# Patient Record
Sex: FEMALE | Race: ASIAN | Employment: OTHER | URBAN - METROPOLITAN AREA
[De-identification: names, ages, dates, MRNs, and addresses within clinical notes are randomized per-mention and may not be internally consistent; named-entity substitution may affect disease eponyms.]

---

## 2017-03-03 ENCOUNTER — HOSPITAL ENCOUNTER (OUTPATIENT)
Dept: MAMMOGRAPHY | Age: 62
Discharge: HOME | End: 2017-03-03
Attending: FAMILY MEDICINE
Payer: COMMERCIAL

## 2017-03-03 ENCOUNTER — HOSPITAL ENCOUNTER (OUTPATIENT)
Dept: ULTRASOUND IMAGING | Age: 62
Discharge: HOME | End: 2017-03-03
Attending: FAMILY MEDICINE
Payer: COMMERCIAL

## 2017-03-03 DIAGNOSIS — R92.8 ABNORMAL MAMMOGRAM: ICD-10-CM

## 2017-03-03 DIAGNOSIS — N64.89 BREAST ASYMMETRY: ICD-10-CM

## 2017-03-03 PROCEDURE — 77065 DX MAMMO INCL CAD UNI: CPT

## 2017-06-30 ENCOUNTER — OFFICE VISIT (OUTPATIENT)
Dept: CARDIOLOGY CLINIC | Age: 62
End: 2017-06-30

## 2017-06-30 VITALS
DIASTOLIC BLOOD PRESSURE: 84 MMHG | HEIGHT: 61 IN | HEART RATE: 86 BPM | WEIGHT: 146 LBS | BODY MASS INDEX: 27.56 KG/M2 | SYSTOLIC BLOOD PRESSURE: 124 MMHG

## 2017-06-30 DIAGNOSIS — R07.9 CHEST PAIN, UNSPECIFIED TYPE: ICD-10-CM

## 2017-06-30 DIAGNOSIS — R06.02 SOB (SHORTNESS OF BREATH): ICD-10-CM

## 2017-06-30 DIAGNOSIS — R94.31 ABNORMAL EKG: Primary | ICD-10-CM

## 2017-06-30 DIAGNOSIS — R25.2 CRAMP OF BOTH LOWER EXTREMITIES: ICD-10-CM

## 2017-06-30 DIAGNOSIS — E78.5 HYPERLIPIDEMIA, UNSPECIFIED HYPERLIPIDEMIA TYPE: ICD-10-CM

## 2017-06-30 NOTE — LETTER
Teresa Anderson 
1955 
 
6/30/2017 Dear Anderson Garza MD 
 
I had the pleasure of evaluating  Ms. Neal in office today. Below are the relevant portions of my assessment and plan of care. ICD-10-CM ICD-9-CM 1. Abnormal EKG Y99.11 921.27 METABOLIC PANEL, BASIC  
 negative stress test 
medical managment 2. Chest pain, unspecified type B38.7 363.78 METABOLIC PANEL, BASIC  
 atypical 
non cardiac 3. SOB (shortness of breath) R06.02 786.05   
 stable 4. Hyperlipidemia, unspecified hyperlipidemia type E78.5 272.4   
 started on rosuvastatin 3 month ago 5. Cramp of both lower extremities R25.2 729.82   
 check bmp 
hydration 
has crams before stating statin Current Outpatient Prescriptions Medication Sig Dispense Refill  naproxen (NAPROSYN) 500 mg tablet Take 500 mg by mouth as needed.  omeprazole (PRILOSEC) 20 mg capsule Take 20 mg by mouth nightly as needed.  HYDROcodone-acetaminophen (NORCO) 5-325 mg per tablet Take 1 Tab by mouth every four (4) hours as needed. Max Daily Amount: 6 Tabs. 30 Tab 0 Orders Placed This Encounter  METABOLIC PANEL, BASIC Standing Status:   Future Standing Expiration Date:   7/30/2017 If you have questions, please do not hesitate to call me. I look forward to following Ms. Eleni Frazier along with you. Sincerely, Khang Fink MD

## 2017-06-30 NOTE — PROGRESS NOTES
HISTORY OF PRESENT ILLNESS  Katey Burkett is a 63 y.o. female. HPI Comments:       Shortness of Breath   The history is provided by the patient. This is a recurrent problem. The problem occurs intermittently. The problem has not changed since onset. Pertinent negatives include no fever, no headaches, no cough, no sputum production, no hemoptysis, no wheezing, no PND, no orthopnea, no chest pain, no vomiting, no abdominal pain, no rash, no leg swelling and no claudication. Chest Pain (Angina)    The history is provided by the patient. This is a new problem. The problem has not changed since onset. The problem occurs every several days. The pain is associated with exertion and rest. The pain is present in the substernal region and left side. The pain is mild. The quality of the pain is described as pressure-like and dull. The pain does not radiate. Associated symptoms include shortness of breath. Pertinent negatives include no abdominal pain, no claudication, no cough, no dizziness, no fever, no headaches, no hemoptysis, no nausea, no orthopnea, no palpitations, no PND, no sputum production, no vomiting and no weakness. Review of Systems   Constitutional: Negative for chills and fever. HENT: Negative for nosebleeds. Eyes: Negative for blurred vision and double vision. Respiratory: Positive for shortness of breath. Negative for cough, hemoptysis, sputum production and wheezing. Cardiovascular: Negative for chest pain, palpitations, orthopnea, claudication, leg swelling and PND. Gastrointestinal: Negative for abdominal pain, heartburn, nausea and vomiting. Musculoskeletal: Negative for myalgias. Skin: Negative for rash. Neurological: Negative for dizziness, weakness and headaches. Endo/Heme/Allergies: Does not bruise/bleed easily.      Family History   Problem Relation Age of Onset    Heart Surgery Neg Hx     Heart Attack Neg Hx        Past Medical History:   Diagnosis Date    Anxiety  Arthritis     GERD (gastroesophageal reflux disease)        Past Surgical History:   Procedure Laterality Date    HX CHOLECYSTECTOMY  2015    HX HYSTERECTOMY      HX OTHER SURGICAL      parotid       Social History   Substance Use Topics    Smoking status: Never Smoker    Smokeless tobacco: Never Used    Alcohol use No       No Known Allergies    Outpatient Prescriptions Marked as Taking for the 6/30/17 encounter (Office Visit) with Michelle Luna MD   Medication Sig Dispense Refill    naproxen (NAPROSYN) 500 mg tablet Take 500 mg by mouth as needed.  omeprazole (PRILOSEC) 20 mg capsule Take 20 mg by mouth nightly as needed. Visit Vitals    /84    Pulse 86    Ht 5' 1\" (1.549 m)    Wt 66.2 kg (146 lb)    BMI 27.59 kg/m2       Physical Exam   Constitutional: She is oriented to person, place, and time. She appears well-developed and well-nourished. HENT:   Head: Normocephalic and atraumatic. Eyes: Conjunctivae are normal.   Neck: Neck supple. No JVD present. No tracheal deviation present. No thyromegaly present. Cardiovascular: Normal rate and regular rhythm. PMI is not displaced. Exam reveals no gallop, no S3 and no decreased pulses. No murmur heard. Pulmonary/Chest: No respiratory distress. She has no wheezes. She has no rales. She exhibits no tenderness. Abdominal: Soft. There is no tenderness. Musculoskeletal: She exhibits no edema. Neurological: She is alert and oriented to person, place, and time. Skin: Skin is warm. Psychiatric: She has a normal mood and affect. Ms. Dereck Reilly has a reminder for a \"due or due soon\" health maintenance. I have asked that she contact her primary care provider for follow-up on this health maintenance. I have personally reviewed patients ekg done at other facility.   5/2016  Sinus rhythm with occasional premature ventricular complexes   Inferior infarct , age undetermined   Cannot rule out Anterior infarct , age undetermined   Abnormal ECG   No previous ECGs available   NUCLEAR IMAGIN2016     Findings:    1. Stress images reveal normal Myoview distrubution in all the LV segments in short axis, vertical and horizontal long axis views.    2. Resting images have a normal uptake.    3. Gated images reveal normal wall motion and the ejection fraction is calculated to be 79%.    Conclusion:    1. Normal perfusion scan.    2. Normal wall motion and ejection fraction.    3. Low risk scan. SUMMARY:echo:2016  Procedure information: This was a technically difficult study. Left ventricle: Systolic function was normal. Ejection fraction was  estimated to be 60 %. No obvious wall motion abnormalities identified in  the views obtained. There was mild concentric hypertrophy. Doppler  parameters were consistent with abnormal left ventricular relaxation  (grade 1 diastolic dysfunction). Pericardium: A circumferential pericardial fat pad was present . Assessment         ICD-10-CM ICD-9-CM    1. Abnormal EKG H11.19 952.05 METABOLIC PANEL, BASIC    negative stress test  medical managment   2. Chest pain, unspecified type S11.6 750.22 METABOLIC PANEL, BASIC    atypical  non cardiac   3. SOB (shortness of breath) R06.02 786.05     stable   4. Hyperlipidemia, unspecified hyperlipidemia type E78.5 272.4     started on rosuvastatin 3 month ago     5. Cramp of both lower extremities R25.2 729.82     check bmp  hydration  has crams before stating statin       There are no discontinued medications. Orders Placed This Encounter    METABOLIC PANEL, BASIC     Standing Status:   Future     Standing Expiration Date:   2017       Follow-up Disposition:  Return in about 1 year (around 2018).

## 2017-06-30 NOTE — MR AVS SNAPSHOT
Visit Information Date & Time Provider Department Dept. Phone Encounter #  
 6/30/2017 10:00 AM Marietta Connor MD [facility] [address] [place] [address] [id number] Follow-up Instructions Return in about 1 year (around 6/30/2018). Your Appointments 6/29/2018 10:00 AM  
Office Visit with Marietta Connor MD  
[facility]) Appt Note: 1 yr  
 [address] [facility], [address] [place] [id number]  
[address] [facility], [address] [place] [address] [place] Upcoming Health Maintenance Date Due Hepatitis C Screening 1955 DTaP/Tdap/Td series (1 - Tdap) 10/10/1978 PAP AKA CERVICAL CYTOLOGY 10/10/1978 FOBT Q 1 YEAR AGE 50-75 10/10/2007 INFLUENZA AGE 9 TO ADULT 8/1/2017 BREAST CANCER SCRN MAMMOGRAM 3/3/2019 Allergies as of 6/30/2017  Review Complete On: 6/30/2017 By: Marietta Connor MD  
 No Known Allergies Current Immunizations  Never Reviewed No immunizations on file. Not reviewed this visit You Were Diagnosed With   
  
 Codes Comments Abnormal EKG    -  Primary ICD-10-CM: R94.31 
ICD-9-CM: 794.31 negative stress test 
medical managment Chest pain, unspecified type     ICD-10-CM: R07.9 ICD-9-CM: 786.50 atypical 
non cardiac  
 SOB (shortness of breath)     ICD-10-CM: R06.02 
ICD-9-CM: 786.05 stable Hyperlipidemia, unspecified hyperlipidemia type     ICD-10-CM: E78.5 ICD-9-CM: 272.4 started on rosuvastatin 3 month ago Cramp of both lower extremities     ICD-10-CM: R25.2 ICD-9-CM: 729.82 check bmp 
hydration 
has crams before stating statin Vitals BP Pulse Height(growth percentile) Weight(growth percentile) BMI OB Status 124/84 86 5' 1\" (1.549 m) 146 lb (66.2 kg) 27.59 kg/m2 Hysterectomy Smoking Status Never Smoker Vitals History BMI and BSA Data  Body Mass Index Body Surface Area  
 27.59 kg/m 2 1.69 m 2  
  
  
 Preferred Pharmacy Pharmacy Name Phone [facility], [address] [facility] [phone] Your Updated Medication List  
  
   
This list is accurate as of: 6/30/17 10:16 AM.  Always use your most recent med list.  
  
  
  
  
 HYDROcodone-acetaminophen 5-325 mg per tablet Commonly known as:  Lamar Rook Take 1 Tab by mouth every four (4) hours as needed. Max Daily Amount: 6 Tabs. naproxen 500 mg tablet Commonly known as:  NAPROSYN Take 500 mg by mouth as needed. omeprazole 20 mg capsule Commonly known as:  PRILOSEC Take 20 mg by mouth nightly as needed. Follow-up Instructions Return in about 1 year (around 6/30/2018). To-Do List   
 07/07/2017 Lab:  METABOLIC PANEL, BASIC Introducing [facility] SERVICES! Javid Abrams introduces [employer] patient portal. Now you can access parts of your medical record, email your doctor's office, and request medication refills online. 1. In your internet browser, go to [email or web address] 2. Click on the First Time User? Click Here link in the Sign In box. You will see the New Member Sign Up page. 3. Enter your [employer] Access Code exactly as it appears below. You will not need to use this code after youve completed the sign-up process. If you do not sign up before the expiration date, you must request a new code. · [employer] Access Code: [email or web address] Expires: 9/28/2017  9:49 AM 
 
4. Enter the last four digits of your Social Security Number (xxxx) and Date of Birth (mm/dd/yyyy) as indicated and click Submit. You will be taken to the next sign-up page. 5. Create a [employer] ID. This will be your [employer] login ID and cannot be changed, so think of one that is secure and easy to remember. 6. Create a [employer] password. You can change your password at any time. 7. Enter your Password Reset Question and Answer.  This can be used at a later time if you forget your password. 8. Enter your e-mail address. You will receive e-mail notification when new information is available in [address] 9. Click Sign Up. You can now view and download portions of your medical record. 10. Click the Download Summary menu link to download a portable copy of your medical information. If you have questions, please visit the Frequently Asked Questions section of the [employer] website. Remember, [employer] is NOT to be used for urgent needs. For medical emergencies, dial 911. Now available from your iPhone and Android! Please provide this summary of care documentation to your next provider. Your primary care clinician is listed as Marquise Aponte. If you have any questions after today's visit, please call [phone].

## 2017-07-04 LAB
BUN SERPL-MCNC: 9 MG/DL (ref 6–24)
BUN/CREAT SERPL: 15 (ref 9–23)
CALCIUM SERPL-MCNC: 9.3 MG/DL (ref 8.7–10.2)
CHLORIDE SERPL-SCNC: 105 MMOL/L (ref 96–106)
CO2 SERPL-SCNC: 25 MMOL/L (ref 18–29)
CREAT SERPL-MCNC: 0.59 MG/DL (ref 0.57–1)
GLUCOSE SERPL-MCNC: 98 MG/DL (ref 65–99)
POTASSIUM SERPL-SCNC: 4.3 MMOL/L (ref 3.5–5.2)
SODIUM SERPL-SCNC: 146 MMOL/L (ref 134–144)

## 2017-07-05 ENCOUNTER — TELEPHONE (OUTPATIENT)
Dept: CARDIOLOGY CLINIC | Age: 62
End: 2017-07-05

## 2017-07-05 NOTE — TELEPHONE ENCOUNTER
----- Message from Bobby Sainz MD sent at 7/5/2017 11:40 AM EDT -----  Lab/test  reviewed  No significant abnormality

## 2017-07-05 NOTE — TELEPHONE ENCOUNTER
Patient called to review lab results. She voices understanding and acceptance of this advice and will call back if any further questions or concerns.

## 2017-07-07 DIAGNOSIS — R94.31 ABNORMAL EKG: ICD-10-CM

## 2017-07-07 DIAGNOSIS — R07.9 CHEST PAIN, UNSPECIFIED TYPE: ICD-10-CM

## 2017-09-06 ENCOUNTER — HOSPITAL ENCOUNTER (OUTPATIENT)
Dept: MAMMOGRAPHY | Age: 62
Discharge: HOME | End: 2017-09-06
Payer: COMMERCIAL

## 2017-09-06 DIAGNOSIS — Z12.31 VISIT FOR SCREENING MAMMOGRAM: ICD-10-CM

## 2017-09-06 PROCEDURE — 77067 SCR MAMMO BI INCL CAD: CPT

## 2018-04-24 ENCOUNTER — HOSPITAL ENCOUNTER (OUTPATIENT)
Dept: LAB | Age: 63
Discharge: HOME | End: 2018-04-24

## 2018-04-24 LAB — SENTARA SPECIMEN COL,SENBCF: NORMAL

## 2018-04-24 PROCEDURE — 99001 SPECIMEN HANDLING PT-LAB: CPT | Performed by: FAMILY MEDICINE

## 2018-06-13 ENCOUNTER — HOSPITAL ENCOUNTER (OUTPATIENT)
Dept: GENERAL RADIOLOGY | Age: 63
Discharge: HOME | End: 2018-06-13
Payer: COMMERCIAL

## 2018-06-13 DIAGNOSIS — M46.93 INFLAMMATORY SPONDYLOPATHY OF CERVICOTHORACIC REGION (HCC): ICD-10-CM

## 2018-06-13 PROCEDURE — 72050 X-RAY EXAM NECK SPINE 4/5VWS: CPT

## 2018-06-29 ENCOUNTER — OFFICE VISIT (OUTPATIENT)
Dept: CARDIOLOGY CLINIC | Age: 63
End: 2018-06-29

## 2018-06-29 VITALS
HEART RATE: 87 BPM | DIASTOLIC BLOOD PRESSURE: 83 MMHG | WEIGHT: 148 LBS | SYSTOLIC BLOOD PRESSURE: 132 MMHG | HEIGHT: 61 IN | BODY MASS INDEX: 27.94 KG/M2

## 2018-06-29 DIAGNOSIS — R06.02 SOB (SHORTNESS OF BREATH): ICD-10-CM

## 2018-06-29 DIAGNOSIS — R94.31 ABNORMAL EKG: Primary | ICD-10-CM

## 2018-06-29 DIAGNOSIS — R07.9 CHEST PAIN, UNSPECIFIED TYPE: ICD-10-CM

## 2018-06-29 DIAGNOSIS — E78.5 HYPERLIPIDEMIA, UNSPECIFIED HYPERLIPIDEMIA TYPE: ICD-10-CM

## 2018-06-29 RX ORDER — ATORVASTATIN CALCIUM 20 MG/1
20 TABLET, FILM COATED ORAL DAILY
COMMUNITY
End: 2019-09-04

## 2018-06-29 NOTE — MR AVS SNAPSHOT
[address] [facility] [address] 
 
 
 [address] [facility], [address] [facility] [address] 
[phone] Patient: Gill Mayen 
MRN: [id number] :1955 Visit Information Date & Time Provider Department Dept. Phone Encounter #  
 2018 10:00 AM Mike Singh MD [facility] [address] [place] [address] [id number] Follow-up Instructions Return in about 1 year (around 2019). Your Appointments 2019 10:00 AM  
Office Visit with Mike Singh MD  
[facility]) Appt Note: 1 yr  
 [address] [facility], [address] [facility] [address]  
[address] [facility], [address] [place] [address] Upcoming Health Maintenance Date Due Hepatitis C Screening 1955 DTaP/Tdap/Td series (1 - Tdap) 10/10/1978 PAP AKA CERVICAL CYTOLOGY 10/10/1978 FOBT Q 1 YEAR AGE 50-75 10/10/2007 ZOSTER VACCINE AGE 60> 8/10/2017 Influenza Age 5 to Adult 2018 BREAST CANCER SCRN MAMMOGRAM 2019 Allergies as of 2018  Review Complete On: 2018 By: Angela Bledsoe No Known Allergies Current Immunizations  Never Reviewed No immunizations on file. Not reviewed this visit You Were Diagnosed With   
  
 Codes Comments Abnormal EKG    -  Primary ICD-10-CM: R94.31 
ICD-9-CM: 794.31 Reported as possible old MI but negative stress test a few years ago clinically stable Hyperlipidemia, unspecified hyperlipidemia type     ICD-10-CM: E78.5 ICD-9-CM: 272.4 Stable-on statin 
lab with pcp  
 SOB (shortness of breath)     ICD-10-CM: R06.02 
ICD-9-CM: 786.05 Stable symptoms exertional.  Negative stress test and normal LV systolic function. Chest pain, unspecified type     ICD-10-CM: R07.9 ICD-9-CM: 786.50 atypical 
mobxgto-awtlsbmf-xtsznbto spine arthritis Vitals BP Pulse Height(growth percentile) Weight(growth percentile) BMI OB Status 132/83 87 5' 1\" (1.549 m) 148 lb (67.1 kg) 27.96 kg/m2 Hysterectomy Smoking Status Never Smoker Vitals History BMI and BSA Data Body Mass Index Body Surface Area  
 27.96 kg/m 2 1.7 m 2 Preferred Pharmacy Pharmacy Name Phone Mayela Pelletier, [address] [phone] Your Updated Medication List  
  
   
This list is accurate as of 6/29/18 10:11 AM.  Always use your most recent med list.  
  
  
  
  
 atorvastatin 20 mg tablet Commonly known as:  LIPITOR Take  by mouth daily. HYDROcodone-acetaminophen 5-325 mg per tablet Commonly known as:  Mary Lou Plum Take 1 Tab by mouth every four (4) hours as needed. Max Daily Amount: 6 Tabs. naproxen 500 mg tablet Commonly known as:  NAPROSYN Take 500 mg by mouth as needed. omeprazole 20 mg capsule Commonly known as:  PRILOSEC Take 20 mg by mouth nightly as needed. Follow-up Instructions Return in about 1 year (around 6/29/2019). Please provide this summary of care documentation to your next provider. Your primary care clinician is listed as Micki Simposn. If you have any questions after today's visit, please call [phone].

## 2018-06-29 NOTE — LETTER
Roxanna Flanagan 
1955 
 
6/29/2018 Dear Meka Kerns MD 
 
I had the pleasure of evaluating  Ms. Neal in office today. Below are the relevant portions of my assessment and plan of care. ICD-10-CM ICD-9-CM 1. Abnormal EKG R94.31 794.31 Reported as possible old MI but negative stress test a few years ago clinically stable 2. Hyperlipidemia, unspecified hyperlipidemia type E78.5 272.4 Stable-on statin 
lab with pcp 3. SOB (shortness of breath) R06.02 786.05 Stable symptoms exertional.  Negative stress test and normal LV systolic function. 4. Chest pain, unspecified type R07.9 786.50   
 atypical 
ksrmqdi-lndxuxlc-ebyrdeta spine arthritis Current Outpatient Prescriptions Medication Sig Dispense Refill  atorvastatin (LIPITOR) 20 mg tablet Take  by mouth daily.  naproxen (NAPROSYN) 500 mg tablet Take 500 mg by mouth as needed.  omeprazole (PRILOSEC) 20 mg capsule Take 20 mg by mouth nightly as needed.  HYDROcodone-acetaminophen (NORCO) 5-325 mg per tablet Take 1 Tab by mouth every four (4) hours as needed. Max Daily Amount: 6 Tabs. 30 Tab 0 Orders Placed This Encounter  atorvastatin (LIPITOR) 20 mg tablet Sig: Take  by mouth daily. If you have questions, please do not hesitate to call me. I look forward to following Ms. Eleni Frazier along with you. Sincerely, Bryan Santamaria MD

## 2018-06-29 NOTE — PROGRESS NOTES
HISTORY OF PRESENT ILLNESS  Yvonne Gonzalez is a 63 y.o. female. HPI Comments:       Cholesterol Problem   The history is provided by the patient. This is a chronic problem. The problem occurs constantly. The problem has not changed since onset. Associated symptoms include shortness of breath. Pertinent negatives include no chest pain, no abdominal pain and no headaches. Shortness of Breath   The history is provided by the patient. This is a recurrent problem. The problem occurs intermittently. The problem has not changed since onset. Pertinent negatives include no fever, no headaches, no cough, no sputum production, no hemoptysis, no wheezing, no PND, no orthopnea, no chest pain, no vomiting, no abdominal pain, no rash, no leg swelling and no claudication. Chest Pain (Angina)    The history is provided by the patient. This is a recurrent problem. The problem has not changed since onset. The problem occurs every several days. The pain is associated with exertion and rest. The pain is present in the substernal region and left side. The pain is mild. The quality of the pain is described as pressure-like and dull. The pain does not radiate. Associated symptoms include shortness of breath. Pertinent negatives include no abdominal pain, no claudication, no cough, no dizziness, no fever, no headaches, no hemoptysis, no nausea, no orthopnea, no palpitations, no PND, no sputum production, no vomiting and no weakness. Review of Systems   Constitutional: Negative for chills and fever. HENT: Negative for nosebleeds. Eyes: Negative for blurred vision and double vision. Respiratory: Positive for shortness of breath. Negative for cough, hemoptysis, sputum production and wheezing. Cardiovascular: Negative for chest pain, palpitations, orthopnea, claudication, leg swelling and PND. Gastrointestinal: Negative for abdominal pain, heartburn, nausea and vomiting. Musculoskeletal: Negative for myalgias.    Skin: Negative for rash. Neurological: Negative for dizziness, weakness and headaches. Endo/Heme/Allergies: Does not bruise/bleed easily. Family History   Problem Relation Age of Onset    Heart Surgery Neg Hx     Heart Attack Neg Hx        Past Medical History:   Diagnosis Date    Anxiety     Arthritis     GERD (gastroesophageal reflux disease)        Past Surgical History:   Procedure Laterality Date    HX CHOLECYSTECTOMY  2015    HX HYSTERECTOMY      HX OTHER SURGICAL      parotid       Social History   Substance Use Topics    Smoking status: Never Smoker    Smokeless tobacco: Never Used    Alcohol use No       No Known Allergies        Visit Vitals    /83    Pulse 87    Ht 5' 1\" (1.549 m)    Wt 67.1 kg (148 lb)    BMI 27.96 kg/m2       Physical Exam   Constitutional: She is oriented to person, place, and time. She appears well-developed and well-nourished. HENT:   Head: Normocephalic and atraumatic. Eyes: Conjunctivae are normal.   Neck: Neck supple. No JVD present. No tracheal deviation present. No thyromegaly present. Cardiovascular: Normal rate and regular rhythm. PMI is not displaced. Exam reveals no gallop, no S3 and no decreased pulses. No murmur heard. Pulmonary/Chest: No respiratory distress. She has no wheezes. She has no rales. She exhibits no tenderness. Abdominal: Soft. There is no tenderness. Musculoskeletal: She exhibits no edema. Neurological: She is alert and oriented to person, place, and time. Skin: Skin is warm. Psychiatric: She has a normal mood and affect. Ms. Oidlia Ng has a reminder for a \"due or due soon\" health maintenance. I have asked that she contact her primary care provider for follow-up on this health maintenance. I have personally reviewed patients ekg done at other facility.   5/2016  Sinus rhythm with occasional premature ventricular complexes   Inferior infarct , age undetermined   Cannot rule out Anterior infarct , age undetermined   Abnormal ECG   No previous ECGs available   NUCLEAR IMAGIN2016     Findings:    1. Stress images reveal normal Myoview distrubution in all the LV segments in short axis, vertical and horizontal long axis views.    2. Resting images have a normal uptake.    3. Gated images reveal normal wall motion and the ejection fraction is calculated to be 79%.    Conclusion:    1. Normal perfusion scan.    2. Normal wall motion and ejection fraction.    3. Low risk scan. SUMMARY:echo:2016  Procedure information: This was a technically difficult study. Left ventricle: Systolic function was normal. Ejection fraction was  estimated to be 60 %. No obvious wall motion abnormalities identified in  the views obtained. There was mild concentric hypertrophy. Doppler  parameters were consistent with abnormal left ventricular relaxation  (grade 1 diastolic dysfunction). Pericardium: A circumferential pericardial fat pad was present . Assessment         ICD-10-CM ICD-9-CM    1. Abnormal EKG R94.31 794.31     Reported as possible old MI but negative stress test a few years ago clinically stable   2. Hyperlipidemia, unspecified hyperlipidemia type E78.5 272.4     Stable-on statin  lab with pcp   3. SOB (shortness of breath) R06.02 786.05     Stable symptoms exertional.  Negative stress test and normal LV systolic function. 4. Chest pain, unspecified type R07.9 786.50     atypical  gjzkomh-vmarjexp-fhfwriqp spine arthritis       There are no discontinued medications. No orders of the defined types were placed in this encounter. Follow-up Disposition:  Return in about 1 year (around 2019).

## 2018-12-06 ENCOUNTER — HOSPITAL ENCOUNTER (OUTPATIENT)
Dept: MAMMOGRAPHY | Age: 63
Discharge: HOME | End: 2018-12-06
Attending: FAMILY MEDICINE
Payer: COMMERCIAL

## 2018-12-06 DIAGNOSIS — Z12.31 VISIT FOR SCREENING MAMMOGRAM: ICD-10-CM

## 2018-12-06 PROCEDURE — 77067 SCR MAMMO BI INCL CAD: CPT

## 2019-06-26 ENCOUNTER — OFFICE VISIT (OUTPATIENT)
Dept: CARDIOLOGY CLINIC | Age: 64
End: 2019-06-26

## 2019-06-26 VITALS
SYSTOLIC BLOOD PRESSURE: 149 MMHG | HEIGHT: 61 IN | WEIGHT: 148 LBS | BODY MASS INDEX: 27.94 KG/M2 | HEART RATE: 87 BPM | DIASTOLIC BLOOD PRESSURE: 92 MMHG

## 2019-06-26 DIAGNOSIS — E78.5 HYPERLIPIDEMIA, UNSPECIFIED HYPERLIPIDEMIA TYPE: ICD-10-CM

## 2019-06-26 DIAGNOSIS — R06.02 SOB (SHORTNESS OF BREATH): ICD-10-CM

## 2019-06-26 DIAGNOSIS — I10 ESSENTIAL HYPERTENSION: Primary | ICD-10-CM

## 2019-06-26 DIAGNOSIS — R94.31 ABNORMAL EKG: ICD-10-CM

## 2019-06-26 DIAGNOSIS — R07.9 CHEST PAIN, UNSPECIFIED TYPE: ICD-10-CM

## 2019-06-26 RX ORDER — METOPROLOL SUCCINATE 50 MG/1
50 TABLET, EXTENDED RELEASE ORAL DAILY
Qty: 90 TAB | Refills: 3 | Status: SHIPPED | OUTPATIENT
Start: 2019-06-26 | End: 2019-07-08

## 2019-06-26 NOTE — PROGRESS NOTES
1. Have you been to the ER, urgent care clinic since your last visit? Hospitalized since your last visit? No    2. Have you seen or consulted any other health care providers outside of the [address] [place] [address] since your last visit? Include any pap smears or colon screening.  Yes Where: PCP Reason for visit: Routine Visit

## 2019-06-26 NOTE — PROGRESS NOTES
HISTORY OF PRESENT ILLNESS  Abdulkadir Rene is a 66 y.o. female. Cholesterol Problem   The history is provided by the patient. This is a chronic problem. The problem occurs constantly. The problem has not changed since onset. Associated symptoms include shortness of breath. Pertinent negatives include no chest pain, no abdominal pain and no headaches. Shortness of Breath   The history is provided by the patient. This is a recurrent problem. The problem occurs intermittently. The problem has not changed since onset. Pertinent negatives include no fever, no headaches, no cough, no sputum production, no hemoptysis, no wheezing, no PND, no orthopnea, no chest pain, no vomiting, no abdominal pain, no rash, no leg swelling and no claudication. Chest Pain (Angina)    The history is provided by the patient. This is a recurrent problem. The problem has not changed since onset. The problem occurs every several days. The pain is associated with exertion and rest. The pain is present in the substernal region and left side. The pain is mild. The quality of the pain is described as pressure-like and dull. The pain does not radiate. Associated symptoms include shortness of breath. Pertinent negatives include no abdominal pain, no claudication, no cough, no dizziness, no fever, no headaches, no hemoptysis, no nausea, no orthopnea, no palpitations, no PND, no sputum production, no vomiting and no weakness. Review of Systems   Constitutional: Negative for chills and fever. HENT: Negative for nosebleeds. Eyes: Negative for blurred vision and double vision. Respiratory: Positive for shortness of breath. Negative for cough, hemoptysis, sputum production and wheezing. Cardiovascular: Negative for chest pain, palpitations, orthopnea, claudication, leg swelling and PND. Gastrointestinal: Negative for abdominal pain, heartburn, nausea and vomiting. Musculoskeletal: Negative for myalgias.    Skin: Negative for rash.   Neurological: Negative for dizziness, weakness and headaches. Endo/Heme/Allergies: Does not bruise/bleed easily. Family History   Problem Relation Age of Onset    Heart Surgery Neg Hx     Heart Attack Neg Hx        Past Medical History:   Diagnosis Date    Anxiety     Arthritis     GERD (gastroesophageal reflux disease)        Past Surgical History:   Procedure Laterality Date    HX CHOLECYSTECTOMY  2015    HX HYSTERECTOMY      HX OTHER SURGICAL      parotid       Social History     Tobacco Use    Smoking status: Never Smoker    Smokeless tobacco: Never Used   Substance Use Topics    Alcohol use: No     Alcohol/week: 0.0 oz       No Known Allergies        Visit Vitals  BP (!) 149/92   Pulse 87   Ht 5' 1\" (1.549 m)   Wt 67.1 kg (148 lb)   BMI 27.96 kg/m²       Physical Exam   Constitutional: She is oriented to person, place, and time. She appears well-developed and well-nourished. HENT:   Head: Normocephalic and atraumatic. Eyes: Conjunctivae are normal.   Neck: Neck supple. No JVD present. No tracheal deviation present. No thyromegaly present. Cardiovascular: Normal rate and regular rhythm. PMI is not displaced. Exam reveals no gallop, no S3 and no decreased pulses. No murmur heard. Pulmonary/Chest: No respiratory distress. She has no wheezes. She has no rales. She exhibits no tenderness. Abdominal: Soft. There is no tenderness. Musculoskeletal: She exhibits no edema. Neurological: She is alert and oriented to person, place, and time. Skin: Skin is warm. Psychiatric: She has a normal mood and affect. Ms. Josué Arndt has a reminder for a \"due or due soon\" health maintenance. I have asked that she contact her primary care provider for follow-up on this health maintenance. I have personally reviewed patients ekg done at other facility.   5/2016  Sinus rhythm with occasional premature ventricular complexes   Inferior infarct , age undetermined   Cannot rule out Anterior infarct , age undetermined   Abnormal ECG   No previous ECGs available   NUCLEAR IMAGIN2016     Findings:    1. Stress images reveal normal Myoview distrubution in all the LV segments in short axis, vertical and horizontal long axis views.    2. Resting images have a normal uptake.    3. Gated images reveal normal wall motion and the ejection fraction is calculated to be 79%.    Conclusion:    1. Normal perfusion scan.    2. Normal wall motion and ejection fraction.    3. Low risk scan. SUMMARY:echo:2016  Procedure information: This was a technically difficult study. Left ventricle: Systolic function was normal. Ejection fraction was  estimated to be 60 %. No obvious wall motion abnormalities identified in  the views obtained. There was mild concentric hypertrophy. Doppler  parameters were consistent with abnormal left ventricular relaxation  (grade 1 diastolic dysfunction). Pericardium: A circumferential pericardial fat pad was present . Assessment         ICD-10-CM ICD-9-CM    1. Essential hypertension I10 401.9     Is elevated. Labile. Add Toprol 50 mg a day   2. Hyperlipidemia, unspecified hyperlipidemia type E78.5 272.4     On statin continue treatment lab with PCP   3. SOB (shortness of breath) R06.02 786.05     Stable exertion. Normal ejection fraction continue monitoring continue with exercise training   4. Chest pain, unspecified type R07.9 786.50     Atypical likely neuromuscular. Negative stress test monitor   5. Abnormal EKG R94.31 794.31     Stable asymptomatic negative stress test in past monitor     2019  Cardiac symptoms stable. Blood pressure elevated started Toprol 50 mg a day. Follow-up lab with PCP  There are no discontinued medications. Orders Placed This Encounter    metoprolol succinate (TOPROL-XL) 50 mg XL tablet     Sig: Take 1 Tab by mouth daily.      Dispense:  90 Tab     Refill:  3     Follow-up and Dispositions    · Return in about 1 year (around 6/26/2020). Follow-up and Dispositions    · Return in about 1 year (around 6/26/2020).

## 2019-07-08 ENCOUNTER — OFFICE VISIT (OUTPATIENT)
Dept: CARDIOLOGY CLINIC | Age: 64
End: 2019-07-08

## 2019-07-08 VITALS
DIASTOLIC BLOOD PRESSURE: 89 MMHG | WEIGHT: 147 LBS | SYSTOLIC BLOOD PRESSURE: 137 MMHG | HEIGHT: 61 IN | BODY MASS INDEX: 27.75 KG/M2 | HEART RATE: 109 BPM

## 2019-07-08 DIAGNOSIS — R12 BURNING REFLUX: ICD-10-CM

## 2019-07-08 DIAGNOSIS — R06.02 SOB (SHORTNESS OF BREATH): ICD-10-CM

## 2019-07-08 DIAGNOSIS — R07.9 CHEST PAIN, UNSPECIFIED TYPE: Primary | ICD-10-CM

## 2019-07-08 DIAGNOSIS — I10 ESSENTIAL HYPERTENSION: ICD-10-CM

## 2019-07-08 DIAGNOSIS — E78.5 HYPERLIPIDEMIA, UNSPECIFIED HYPERLIPIDEMIA TYPE: ICD-10-CM

## 2019-07-08 RX ORDER — RANITIDINE 150 MG/1
150 TABLET, FILM COATED ORAL 2 TIMES DAILY
COMMUNITY
End: 2019-08-02 | Stop reason: ALTCHOICE

## 2019-07-08 RX ORDER — DILTIAZEM HYDROCHLORIDE 120 MG/1
120 CAPSULE, EXTENDED RELEASE ORAL DAILY
COMMUNITY

## 2019-07-08 RX ORDER — CYCLOBENZAPRINE HCL 10 MG
10 TABLET ORAL
COMMUNITY
End: 2019-09-09

## 2019-07-08 NOTE — PROGRESS NOTES
1. Have you been to the ER, urgent care clinic since your last visit? Hospitalized since your last visit?     no    2. Have you seen or consulted any other health care providers outside of the [address] [place] [address] since your last visit? Include any pap smears or colon screening.       No

## 2019-07-08 NOTE — PROGRESS NOTES
HISTORY OF PRESENT ILLNESS  Noe Ngo is a 66 y.o. female. Cholesterol Problem   The history is provided by the patient. This is a chronic problem. The problem occurs constantly. The problem has not changed since onset. Associated symptoms include chest pain and shortness of breath. Pertinent negatives include no abdominal pain and no headaches. Shortness of Breath   The history is provided by the patient. This is a recurrent problem. The problem occurs intermittently. The problem has not changed since onset. Associated symptoms include chest pain. Pertinent negatives include no fever, no headaches, no cough, no sputum production, no hemoptysis, no wheezing, no PND, no orthopnea, no vomiting, no abdominal pain, no rash, no leg swelling and no claudication. Chest Pain (Angina)    The history is provided by the patient. This is a recurrent problem. The problem has not changed since onset. The problem occurs every several days. The pain is associated with exertion and rest. The pain is present in the substernal region and left side. The pain is mild. The quality of the pain is described as pressure-like and dull. The pain does not radiate. Associated symptoms include shortness of breath. Pertinent negatives include no abdominal pain, no claudication, no cough, no dizziness, no fever, no headaches, no hemoptysis, no nausea, no orthopnea, no palpitations, no PND, no sputum production, no vomiting and no weakness. Review of Systems   Constitutional: Negative for chills and fever. HENT: Negative for nosebleeds. Eyes: Negative for blurred vision and double vision. Respiratory: Positive for shortness of breath. Negative for cough, hemoptysis, sputum production and wheezing. Cardiovascular: Positive for chest pain. Negative for palpitations, orthopnea, claudication, leg swelling and PND. Gastrointestinal: Positive for heartburn. Negative for abdominal pain, nausea and vomiting. Musculoskeletal: Negative for myalgias. Skin: Negative for rash. Neurological: Negative for dizziness, weakness and headaches. Endo/Heme/Allergies: Does not bruise/bleed easily. Family History   Problem Relation Age of Onset    Heart Surgery Neg Hx     Heart Attack Neg Hx        Past Medical History:   Diagnosis Date    Anxiety     Arthritis     GERD (gastroesophageal reflux disease)        Past Surgical History:   Procedure Laterality Date    HX CHOLECYSTECTOMY  2015    HX HYSTERECTOMY      HX OTHER SURGICAL      parotid       Social History     Tobacco Use    Smoking status: Never Smoker    Smokeless tobacco: Never Used   Substance Use Topics    Alcohol use: No     Alcohol/week: 0.0 oz       No Known Allergies        Visit Vitals  /89   Pulse (!) 109   Ht 5' 1\" (1.549 m)   Wt 66.7 kg (147 lb)   BMI 27.78 kg/m²       Physical Exam   Constitutional: She is oriented to person, place, and time. She appears well-developed and well-nourished. HENT:   Head: Normocephalic and atraumatic. Eyes: Conjunctivae are normal.   Neck: Neck supple. No JVD present. No tracheal deviation present. No thyromegaly present. Cardiovascular: Normal rate, regular rhythm, normal heart sounds and intact distal pulses. PMI is not displaced. Exam reveals no gallop, no S3 and no decreased pulses. No murmur heard. Pulmonary/Chest: No respiratory distress. She has no wheezes. She has no rales. She exhibits no tenderness. Abdominal: Soft. There is no tenderness. Musculoskeletal: She exhibits no edema. Neurological: She is alert and oriented to person, place, and time. Skin: Skin is warm. Psychiatric: She has a normal mood and affect. Nursing note and vitals reviewed. Ms. Cha Javier has a reminder for a \"due or due soon\" health maintenance. I have asked that she contact her primary care provider for follow-up on this health maintenance.     I have personally reviewed patients ekg done at other facility. 2016  Sinus rhythm with occasional premature ventricular complexes   Inferior infarct , age undetermined   Cannot rule out Anterior infarct , age undetermined   Abnormal ECG   No previous ECGs available   NUCLEAR IMAGIN2016     Findings:    1. Stress images reveal normal Myoview distrubution in all the LV segments in short axis, vertical and horizontal long axis views.    2. Resting images have a normal uptake.    3. Gated images reveal normal wall motion and the ejection fraction is calculated to be 79%.    Conclusion:    1. Normal perfusion scan.    2. Normal wall motion and ejection fraction.    3. Low risk scan. SUMMARY:echo:2016  Procedure information: This was a technically difficult study. Left ventricle: Systolic function was normal. Ejection fraction was  estimated to be 60 %. No obvious wall motion abnormalities identified in  the views obtained. There was mild concentric hypertrophy. Doppler  parameters were consistent with abnormal left ventricular relaxation  (grade 1 diastolic dysfunction). Pericardium: A circumferential pericardial fat pad was present . Assessment         ICD-10-CM ICD-9-CM    1. Chest pain, unspecified type R07.9 786.50 EKG, 12 LEAD, SUBSEQUENT      NUCLEAR CARDIAC STRESS TEST      ECHO ADULT COMPLETE      AMB POC EKG ROUTINE W/ 12 LEADS, INTER & REP      CT CORONARY ART WO CA+    EKG  NST ordered   2. SOB (shortness of breath) R06.02 786.05     Echo and NST ordered   3. Essential hypertension I10 401.9     Elevated - stopped Metoprolol - monitor   4. Hyperlipidemia, unspecified hyperlipidemia type E78.5 272.4 LIPID PANEL   5. Burning reflux R12 787.1 REFERRAL TO GASTROENTEROLOGY    Referral to GI     2019  Cardiac symptoms stable. Blood pressure elevated started Toprol 50 mg a day. Follow-up lab with PCP    2019 - EKG - ST with right axis deviation - She stopped taking Toprol stating it made he feel bad.  Continues to c/o SOB with chest pain on exertion. She also c/o epigastric burning and frequent belching. Will obtain NST, Echo and calcium score to r/o ischemia. Referral to GI for reflux symptoms. Fasting lipids ordered. Medications Discontinued During This Encounter   Medication Reason    metoprolol succinate (TOPROL-XL) 50 mg XL tablet Other       Orders Placed This Encounter    CT CORONARY ART WO CA+     Standing Status:   Future     Standing Expiration Date:   8/8/2020     Order Specific Question:   Is Patient Allergic to Contrast Dye? Answer:   No     Order Specific Question:   Reason for Exam     Answer:   calcium score     Order Specific Question:   STAT Creatinine as indicated     Answer: Yes    LIPID PANEL     Standing Status:   Future     Standing Expiration Date:   7/8/2020    REFERRAL TO GASTROENTEROLOGY     Referral Priority:   Routine     Referral Type:   Consultation     Referral Reason:   Specialty Services Required     Referred to Provider:   Dwight Tomas MD     Requested Specialty:   Gastroenterology     Number of Visits Requested:   1    EKG, 12 LEAD, SUBSEQUENT     Standing Status:   Future     Standing Expiration Date:   1/8/2020     Order Specific Question:   Reason for Exam:     Answer:   chest pain    AMB POC EKG ROUTINE W/ 12 LEADS, INTER & REP     Order Specific Question:   Reason for Exam:     Answer:   Chest Pain     Follow-up and Dispositions    · Return in about 6 weeks (around 8/19/2019). Follow-up and Dispositions    · Return in about 6 weeks (around 8/19/2019). I have independently evaluated taken history and examined the patient. All relevant labs and testing data's are reviewed. Care plan discussed and updated after review.   Susan Hart MD

## 2019-07-08 NOTE — PATIENT INSTRUCTIONS
Stress test, echocardiogram and CT scan ordered  Have Fasting labs drawn    Referral to Gastroenterology              Heart-Healthy Diet: Care Instructions  Your Care Instructions    A heart-healthy diet has lots of vegetables, fruits, nuts, beans, and whole grains, and is low in salt. It limits foods that are high in saturated fat, such as meats, cheeses, and fried foods. It may be hard to change your diet, but even small changes can lower your risk of heart attack and heart disease. Follow-up care is a key part of your treatment and safety. Be sure to make and go to all appointments, and call your doctor if you are having problems. It's also a good idea to know your test results and keep a list of the medicines you take. How can you care for yourself at home? Watch your portions  · Learn what a serving is. A \"serving\" and a \"portion\" are not always the same thing. Make sure that you are not eating larger portions than are recommended. For example, a serving of pasta is ½ cup. A serving size of meat is 2 to 3 ounces. A 3-ounce serving is about the size of a deck of cards. Measure serving sizes until you are good at [place]" them. Keep in mind that restaurants often serve portions that are 2 or 3 times the size of one serving. · To keep your energy level up and keep you from feeling hungry, eat often but in smaller portions. · Eat only the number of calories you need to stay at a healthy weight. If you need to lose weight, eat fewer calories than your body burns (through exercise and other physical activity). Eat more fruits and vegetables  · Eat a variety of fruit and vegetables every day. Dark green, deep orange, red, or yellow fruits and vegetables are especially good for you. Examples include spinach, carrots, peaches, and berries. · Keep carrots, celery, and other veggies handy for snacks. Buy fruit that is in season and store it where you can see it so that you will be tempted to eat it.   · [place] Big dishes that have a lot of veggies in them, such as stir-fries and soups. Limit saturated and trans fat  · Read food labels, and try to avoid saturated and trans fats. They increase your risk of heart disease. Trans fat is found in many processed foods such as cookies and crackers. · Use olive or canola oil when you cook. Try cholesterol-lowering spreads, such as Benecol or Take Control. · Bake, broil, grill, or steam foods instead of frying them. · Choose lean meats instead of high-fat meats such as hot dogs and sausages. Cut off all visible fat when you prepare meat. · Eat fish, skinless poultry, and meat alternatives such as soy products instead of high-fat meats. Soy products, such as tofu, may be especially good for your heart. · Choose low-fat or fat-free milk and dairy products. Eat fish  · Eat at least two servings of fish a week. Certain fish, such as salmon and tuna, contain omega-3 fatty acids, which may help reduce your risk of heart attack. Eat foods high in fiber  · Eat a variety of grain products every day. Include whole-grain foods that have lots of fiber and nutrients. Examples of whole-grain foods include oats, whole wheat bread, and brown rice. · Buy whole-grain breads and cereals, instead of white bread or pastries. Limit salt and sodium  · Limit how much salt and sodium you eat to help lower your blood pressure. · Taste food before you salt it. Add only a little salt when you think you need it. With time, your taste buds will adjust to less salt. · Eat fewer snack items, fast foods, and other high-salt, processed foods. Check food labels for the amount of sodium in packaged foods. · Choose low-sodium versions of canned goods (such as soups, vegetables, and beans). Limit sugar  · Limit drinks and foods with added sugar. These include candy, desserts, and soda pop. Limit alcohol  · Limit alcohol to no more than 2 drinks a day for men and 1 drink a day for women.  Too much alcohol can cause health problems. When should you call for help? Watch closely for changes in your health, and be sure to contact your doctor if:    · You would like help planning heart-healthy meals. Where can you learn more? Go to http://jenae-angelo.info/. Enter V137 in the search box to learn more about \"Heart-Healthy Diet: Care Instructions. \"  Current as of: July 22, 2018  Content Version: 11.9  © 1276-7103 [employer], [employer]. Care instructions adapted under license by [employer] (which disclaims liability or warranty for this information). If you have questions about a medical condition or this instruction, always ask your healthcare professional. [place] [address] any warranty or liability for your use of this information.

## 2019-07-09 DIAGNOSIS — E78.5 HYPERLIPIDEMIA, UNSPECIFIED HYPERLIPIDEMIA TYPE: ICD-10-CM

## 2019-07-10 ENCOUNTER — HOSPITAL ENCOUNTER (OUTPATIENT)
Dept: LAB | Age: 64
Discharge: HOME | End: 2019-07-10

## 2019-07-10 LAB — XX-LABCORP SPECIMEN COL,LCBCF: NORMAL

## 2019-07-10 PROCEDURE — 99001 SPECIMEN HANDLING PT-LAB: CPT

## 2019-07-11 ENCOUNTER — TELEPHONE (OUTPATIENT)
Dept: CARDIOLOGY CLINIC | Age: 64
End: 2019-07-11

## 2019-07-11 LAB
CHOLEST SERPL-MCNC: 172 MG/DL (ref 100–199)
HDLC SERPL-MCNC: 47 MG/DL
LDLC SERPL CALC-MCNC: 104 MG/DL (ref 0–99)
SPECIMEN STATUS REPORT, ROLRST: NORMAL
TRIGL SERPL-MCNC: 103 MG/DL (ref 0–149)
VLDLC SERPL CALC-MCNC: 21 MG/DL (ref 5–40)

## 2019-07-11 NOTE — TELEPHONE ENCOUNTER
----- Message from Adam Stevens MD sent at 7/11/2019  1:12 PM EDT -----  LDL mildly increased continue with diet

## 2019-07-11 NOTE — TELEPHONE ENCOUNTER
Patient called in concern to lab results received and reviewed. Patient made aware of labs received and reviewed by Dr. Yuliya Rome. Patient made aware of results; per Dr. Gisselle Richardson written order patient to continue with diet, mildly elevated LDL. Patient verbalized understanding of information; reminded to call the office if any questions or concerns.

## 2019-07-30 ENCOUNTER — HOSPITAL ENCOUNTER (OUTPATIENT)
Dept: CT IMAGING | Age: 64
Discharge: HOME | End: 2019-07-30
Attending: NURSE PRACTITIONER
Payer: SELF-PAY

## 2019-07-30 DIAGNOSIS — R07.9 CHEST PAIN, UNSPECIFIED TYPE: ICD-10-CM

## 2019-07-30 PROCEDURE — 75571 CT HRT W/O DYE W/CA TEST: CPT

## 2019-07-30 NOTE — PROGRESS NOTES
Moderate calcium score-will discuss at follow up  Interstitial lung disease-set up pulmonary consult

## 2019-07-31 ENCOUNTER — OFFICE VISIT (OUTPATIENT)
Dept: CARDIOLOGY CLINIC | Age: 64
End: 2019-07-31

## 2019-07-31 VITALS
WEIGHT: 145 LBS | SYSTOLIC BLOOD PRESSURE: 140 MMHG | HEIGHT: 61 IN | HEART RATE: 71 BPM | DIASTOLIC BLOOD PRESSURE: 87 MMHG | BODY MASS INDEX: 27.38 KG/M2

## 2019-07-31 DIAGNOSIS — I25.10 CORONARY ARTERY CALCIFICATION: Primary | ICD-10-CM

## 2019-07-31 DIAGNOSIS — I10 ESSENTIAL HYPERTENSION: ICD-10-CM

## 2019-07-31 DIAGNOSIS — I25.84 CORONARY ARTERY CALCIFICATION: Primary | ICD-10-CM

## 2019-07-31 DIAGNOSIS — R07.9 CHEST PAIN, UNSPECIFIED TYPE: ICD-10-CM

## 2019-07-31 DIAGNOSIS — R06.02 SOB (SHORTNESS OF BREATH): ICD-10-CM

## 2019-07-31 RX ORDER — ISOSORBIDE MONONITRATE 30 MG/1
30 TABLET, EXTENDED RELEASE ORAL
Qty: 30 TAB | Refills: 6 | Status: SHIPPED | OUTPATIENT
Start: 2019-07-31 | End: 2019-09-04

## 2019-07-31 RX ORDER — ASPIRIN 81 MG/1
81 TABLET ORAL DAILY
Qty: 100 TAB | Refills: 1 | Status: SHIPPED | OUTPATIENT
Start: 2019-07-31 | End: 2020-02-28

## 2019-07-31 NOTE — PROGRESS NOTES
HISTORY OF PRESENT ILLNESS  Jayant Joyner is a 66 y.o. female. Chest Pain (Angina)    The history is provided by the patient. This is a recurrent problem. The problem has not changed since onset. The problem occurs every several days. The pain is associated with exertion and rest. The pain is present in the substernal region and left side. The pain is mild. The quality of the pain is described as pressure-like and dull. The pain does not radiate. Associated symptoms include shortness of breath. Pertinent negatives include no abdominal pain, no claudication, no cough, no dizziness, no fever, no headaches, no hemoptysis, no nausea, no orthopnea, no palpitations, no PND, no sputum production, no vomiting and no weakness. Shortness of Breath   The history is provided by the patient. This is a recurrent problem. The problem occurs intermittently. The problem has not changed since onset. Associated symptoms include chest pain. Pertinent negatives include no fever, no headaches, no cough, no sputum production, no hemoptysis, no wheezing, no PND, no orthopnea, no vomiting, no abdominal pain, no rash, no leg swelling and no claudication. Cholesterol Problem   The history is provided by the patient. This is a chronic problem. The problem occurs constantly. The problem has not changed since onset. Associated symptoms include chest pain and shortness of breath. Pertinent negatives include no abdominal pain and no headaches. Review of Systems   Constitutional: Negative for chills and fever. HENT: Negative for nosebleeds. Eyes: Negative for blurred vision and double vision. Respiratory: Positive for shortness of breath. Negative for cough, hemoptysis, sputum production and wheezing. Cardiovascular: Positive for chest pain. Negative for palpitations, orthopnea, claudication, leg swelling and PND. Gastrointestinal: Positive for heartburn. Negative for abdominal pain, nausea and vomiting. Musculoskeletal: Negative for myalgias. Skin: Negative for rash. Neurological: Negative for dizziness, weakness and headaches. Endo/Heme/Allergies: Does not bruise/bleed easily. Family History   Problem Relation Age of Onset    Heart Surgery Neg Hx     Heart Attack Neg Hx        Past Medical History:   Diagnosis Date    Anxiety     Arthritis     GERD (gastroesophageal reflux disease)        Past Surgical History:   Procedure Laterality Date    HX CHOLECYSTECTOMY  2015    HX HYSTERECTOMY      HX OTHER SURGICAL      parotid       Social History     Tobacco Use    Smoking status: Never Smoker    Smokeless tobacco: Never Used   Substance Use Topics    Alcohol use: No     Alcohol/week: 0.0 standard drinks       No Known Allergies        Visit Vitals  /87   Pulse 71   Ht 5' 1\" (1.549 m)   Wt 65.8 kg (145 lb)   BMI 27.40 kg/m²       Physical Exam   Constitutional: She is oriented to person, place, and time. She appears well-developed and well-nourished. HENT:   Head: Normocephalic and atraumatic. Eyes: Conjunctivae are normal.   Neck: Neck supple. No JVD present. No tracheal deviation present. No thyromegaly present. Cardiovascular: Normal rate, regular rhythm, normal heart sounds and intact distal pulses. PMI is not displaced. Exam reveals no gallop, no S3 and no decreased pulses. No murmur heard. Pulmonary/Chest: No respiratory distress. She has no wheezes. She has no rales. She exhibits no tenderness. Abdominal: Soft. There is no tenderness. Musculoskeletal: She exhibits no edema. Neurological: She is alert and oriented to person, place, and time. Skin: Skin is warm. Psychiatric: She has a normal mood and affect. Nursing note and vitals reviewed. Ms. Hai Carrillo has a reminder for a \"due or due soon\" health maintenance. I have asked that she contact her primary care provider for follow-up on this health maintenance.     I have personally reviewed patients ekg done at other facility. 2016  Sinus rhythm with occasional premature ventricular complexes   Inferior infarct , age undetermined   Cannot rule out Anterior infarct , age undetermined   Abnormal ECG   No previous ECGs available   NUCLEAR IMAGIN2016     Findings:    1. Stress images reveal normal Myoview distrubution in all the LV segments in short axis, vertical and horizontal long axis views.    2. Resting images have a normal uptake.    3. Gated images reveal normal wall motion and the ejection fraction is calculated to be 79%.    Conclusion:    1. Normal perfusion scan.    2. Normal wall motion and ejection fraction.    3. Low risk scan. SUMMARY:echo:2016  Procedure information: This was a technically difficult study. Left ventricle: Systolic function was normal. Ejection fraction was  estimated to be 60 %. No obvious wall motion abnormalities identified in  the views obtained. There was mild concentric hypertrophy. Doppler  parameters were consistent with abnormal left ventricular relaxation  (grade 1 diastolic dysfunction). Pericardium: A circumferential pericardial fat pad was present . IMPRESSION 2019  IMPRESSION:     1. Air trapping in the lung bases suggesting presence of interstitial lung  disease. Limited evaluation. Follow-up with CT of the thorax is recommended     The final Radiology portion of this exam was provided by Dr. Itzel Snyder on  2019 10:48 AM.     The final Cardiology report will follow. END RADIOLOGY PORTION OF REPORT     CARDIOLOGY REPORT:   2019     The patient underwent a limited noncontrast CT scan of her chest with cardiac  gating to evaluate for coronary arterial calcification. Using the Agatston  method the coronary calcium score was calculated. There is a large amount of  coronary calcification present in all 3 major coronary vessels. Coronary calcium  score calculated 393.   Procedure Conclusion     Nuclear Stress Test 2019    Negative myocardial perfusion imaging. Myocardial perfusion imaging supports a low risk stress test.   There is a prior study available for comparison. As compared to the previous study, there are no significant changes. Interpretation Summary 7/2019       · Left Ventricle: Normal cavity size and systolic function (ejection fraction normal). Mild concentric hypertrophy. Estimated left ventricular ejection fraction is 61 - 65%. No regional wall motion abnormality noted. Mild (grade 1) left ventricular diastolic dysfunction. · Right Ventricle: Normal right ventricular size and function. · No hemodynamically significant valvular pathology. · Pericardium: A circumferential pericardial effusion with a pericardial fat pad was present. Assessment         ICD-10-CM ICD-9-CM    1. Coronary artery calcification I25.10 414.00 REFERRAL TO PULMONARY DISEASE    I25.84 414.4     Moderate coronary calcification. Aspirin and statin negative stress test   2. Chest pain, unspecified type R07.9 786.50     Atypical negative stress test will monitor clinically persist, it might require further evaluation   3. SOB (shortness of breath) R06.02 786.05 REFERRAL TO PULMONARY DISEASE    Normal ejection fraction had trapping and possible interstitial lung disease noted on CAT scan referred for pulmonary evaluation. Possible angina equivalent. 4. Essential hypertension I10 401.9     Stable continue treatment     6/2019  Cardiac symptoms stable. Blood pressure elevated started Toprol 50 mg a day. Follow-up lab with PCP    7/2019 - EKG - ST with right axis deviation - She stopped taking Toprol stating it made he feel bad. Continues to c/o SOB with chest pain on exertion. She also c/o epigastric burning and frequent belching. Will obtain NST, Echo and calcium score to r/o ischemia. Referral to GI for reflux symptoms. Fasting lipids ordered. 7/31/2019  CTA showing moderate calcium score. Also suggested interstitial lung disease.   Negative stress test.  Remains short of breath with occasional atypical chest pains. Add Imdur and aspirin. Continue diltiazem and statin. Referred to pulmonary. If no significant pulmonary problem consider invasive cardiac work-up to assess for possible angina equivalent related shortness of breath  There are no discontinued medications. Orders Placed This Encounter    REFERRAL TO PULMONARY DISEASE     Referral Priority:   Routine     Referral Type:   Consultation     Referral Reason:   Specialty Services Required     Referred to Provider:   Vanita Cantrell MD    aspirin delayed-release 81 mg tablet     Sig: Take 1 Tab by mouth daily. Dispense:  100 Tab     Refill:  1    isosorbide mononitrate ER (IMDUR) 30 mg tablet     Sig: Take 1 Tab by mouth every morning. Dispense:  30 Tab     Refill:  6     Follow-up and Dispositions    · Return in about 6 weeks (around 9/11/2019). Follow-up and Dispositions    · Return in about 6 weeks (around 9/11/2019).          Ann Hoskins MD

## 2019-07-31 NOTE — PROGRESS NOTES
1. Have you been to the ER, urgent care clinic since your last visit? Hospitalized since your last visit? No    2. Have you seen or consulted any other health care providers outside of the [address] [place] [address] since your last visit? Include any pap smears or colon screening.  No

## 2019-08-02 ENCOUNTER — OFFICE VISIT (OUTPATIENT)
Dept: PULMONOLOGY | Age: 64
End: 2019-08-02

## 2019-08-02 VITALS
OXYGEN SATURATION: 98 % | TEMPERATURE: 98.3 F | DIASTOLIC BLOOD PRESSURE: 88 MMHG | RESPIRATION RATE: 21 BRPM | SYSTOLIC BLOOD PRESSURE: 120 MMHG | BODY MASS INDEX: 27.19 KG/M2 | HEART RATE: 94 BPM | HEIGHT: 61 IN | WEIGHT: 144 LBS

## 2019-08-02 DIAGNOSIS — J98.4 RESTRICTIVE LUNG DISEASE: ICD-10-CM

## 2019-08-02 DIAGNOSIS — R06.09 DYSPNEA ON EXERTION: ICD-10-CM

## 2019-08-02 DIAGNOSIS — J84.9 ILD (INTERSTITIAL LUNG DISEASE) (HCC): ICD-10-CM

## 2019-08-02 DIAGNOSIS — R91.8 PULMONARY INFILTRATES: ICD-10-CM

## 2019-08-02 DIAGNOSIS — R06.02 SOB (SHORTNESS OF BREATH): Primary | ICD-10-CM

## 2019-08-02 DIAGNOSIS — R13.10 DYSPHAGIA, UNSPECIFIED TYPE: ICD-10-CM

## 2019-08-02 DIAGNOSIS — R06.02 SOB (SHORTNESS OF BREATH): ICD-10-CM

## 2019-08-02 RX ORDER — RANITIDINE 150 MG/1
150 CAPSULE ORAL 2 TIMES DAILY
COMMUNITY
Start: 2020-01-08 | End: 2020-01-08

## 2019-08-02 RX ORDER — ALBUTEROL SULFATE 90 UG/1
1-2 AEROSOL, METERED RESPIRATORY (INHALATION)
Qty: 1 INHALER | Refills: 4 | Status: SHIPPED | OUTPATIENT
Start: 2019-08-02 | End: 2021-02-03

## 2019-08-02 RX ORDER — HYDROCHLOROTHIAZIDE 12.5 MG/1
12.5 CAPSULE ORAL
Refills: 3 | COMMUNITY
Start: 2019-07-16

## 2019-08-02 RX ORDER — MOXIFLOXACIN 5 MG/ML
SOLUTION/ DROPS OPHTHALMIC
Refills: 0 | COMMUNITY
Start: 2019-05-21 | End: 2019-09-04

## 2019-08-02 RX ORDER — METOPROLOL SUCCINATE 50 MG/1
TABLET, EXTENDED RELEASE ORAL
Refills: 3 | COMMUNITY
Start: 2019-06-26 | End: 2019-09-04

## 2019-08-02 RX ORDER — ERGOCALCIFEROL 1.25 MG/1
CAPSULE ORAL
Refills: 5 | COMMUNITY
Start: 2019-07-04 | End: 2021-02-03

## 2019-08-02 RX ORDER — PREDNISOLONE ACETATE 10 MG/ML
1 SUSPENSION/ DROPS OPHTHALMIC 3 TIMES DAILY
COMMUNITY
Start: 2019-05-17 | End: 2019-08-02 | Stop reason: ALTCHOICE

## 2019-08-02 RX ORDER — KETOROLAC TROMETHAMINE 5 MG/ML
1 SOLUTION OPHTHALMIC 4 TIMES DAILY
Refills: 0 | COMMUNITY
Start: 2019-05-01 | End: 2021-02-03

## 2019-08-02 NOTE — LETTER
8/3/19 Patient: Janie Carias  
YOB: 1955 Date of Visit: 8/2/2019 Adam Stevens MD 
[address] [facility] [address] VIA In Basket Hilario Flores MD 
[address] [facility] [address] VIA Facsimile: [phone] Patsy Nielson MD 
[address] [facility] [place] [address] VIA In Basket Dear MD Hilario Corcoran MD Lucita Bologna, MD, Thank you for referring Ms. Janie Carias to [address] [place] [address] for evaluation. My notes for this consultation are attached. If you have questions, please do not hesitate to call me. I look forward to following your patient along with you. Sincerely, Viridiana Schroeder MD/MPH Pulmonary, Critical Care Medicine [facility]

## 2019-08-02 NOTE — PROGRESS NOTES
Verbal Order with read back per Chhaya Singh MD  For PFT smart panel. AMB POC PFT complete w/ bronchodilator    Dr. Sebastian Delaney MD will co-sign the orders.

## 2019-08-02 NOTE — PROGRESS NOTES
[address] [facility] [address] 
[phone] [facility] Pulmonary, Critical Care, and Sleep Medicine Pulmonary Office Initial Consultation Name: Fatemeh Zamora 66 y.o. female MRN: [id number] : 1955 Service Date: 19 Referring Provider: No referring provider defined for this encounter. Chief Complaint:  
Chief Complaint Patient presents with  Referral / Consult Referred by Dr. Greg Mi for SOB  Results Heart CT 2019  Shortness of Breath History of Present Illness: 
Fatemeh Zamora is a 66 y.o. female, who presents to Pulmonary clinic referred for ***. Past Medical Hx: I have personally reviewed medical hx Past Medical History:  
Diagnosis Date  Anxiety  Arthritis  GERD (gastroesophageal reflux disease)  Hypertension Past Surgical Hx: I have personally reviewed surgical hx Past Surgical History:  
Procedure Laterality Date  HX CHOLECYSTECTOMY    HX HYSTERECTOMY  HX OTHER SURGICAL    
 parotid Family Hx: I have personally reviewed the family hx. No family hx of cancer or hereditary lung disease with immediate family. *** Family History Problem Relation Age of Onset  Heart Disease Mother [facility] Arthritis-osteo Mother  Arthritis-osteo Father  Heart Surgery Neg Hx   
 Heart Attack Neg Hx Social Hx: I have personally reviewed the social hx. Social History Socioeconomic History  Marital status:  Spouse name: Not on file  Number of children: Not on file  Years of education: Not on file  Highest education level: Not on file Occupational History  Not on file Social Needs  Financial resource strain: Not on file  Food insecurity:  
  Worry: Not on file Inability: Not on file  Transportation needs:  
  Medical: Not on file Non-medical: Not on file Tobacco Use  Smoking status: Never Smoker  Smokeless tobacco: Never Used Substance and Sexual Activity  Alcohol use: No  
  Alcohol/week: 0.0 standard drinks  Drug use: No  
 Sexual activity: Not on file Lifestyle  Physical activity:  
  Days per week: Not on file Minutes per session: Not on file  Stress: Not on file Relationships  Social connections:  
  Talks on phone: Not on file Gets together: Not on file Attends [employer] service: Not on file Active member of club or organization: Not on file Attends meetings of clubs or organizations: Not on file Relationship status: Not on file  Intimate partner violence:  
  Fear of current or ex partner: Not on file Emotionally abused: Not on file Physically abused: Not on file Forced sexual activity: Not on file Other Topics Concern  Not on file Social History Narrative  Not on file Smoking Hx: *** Occupational Hx: *** No occupational exposure to coal, silica, or asbestos Additional Hx: *** Allergies: I have reviewed the allergy hx No Known Allergies Medications:  I have reviewed the patient's medications Prior to Admission medications Medication Sig Start Date End Date Taking? Authorizing Provider VITAMIN D2 50,000 unit capsule TAKE 1 CAPSULE BY MOUTH ONCE A WEEK 7/4/19  Yes Provider, Historical  
hydroCHLOROthiazide (MICROZIDE) 12.5 mg capsule TAKE 1 CAPSULE BY MOUTH ONCE DAILY 7/16/19  Yes Provider, Historical  
ketorolac (ACULAR) 0.5 % ophthalmic solution Administer 1 Drop to both eyes four (4) times daily. 5/1/19  Yes Provider, Historical  
raNITIdine hcl 150 mg capsule Take 150 mg by mouth two (2) times a day. Yes Provider, Historical  
aspirin delayed-release 81 mg tablet Take 1 Tab by mouth daily.  7/31/19  Yes Sirisha Ruiz MD  
isosorbide mononitrate ER (IMDUR) 30 mg tablet Take 1 Tab by mouth every morning. 7/31/19  Yes Sirisha Ruiz MD  
cyclobenzaprine (FLEXERIL) 10 mg tablet Take 10 mg by mouth three (3) times daily as needed for Muscle Spasm(s). Yes Provider, Historical  
dilTIAZem CD (CARTIA XT) 120 mg ER capsule Take 120 mg by mouth daily. Yes Provider, Historical  
atorvastatin (LIPITOR) 20 mg tablet Take 20 mg by mouth daily. Yes Provider, Historical  
naproxen (NAPROSYN) 500 mg tablet Take 500 mg by mouth as needed. Yes Provider, Historical  
HYDROcodone-acetaminophen (NORCO) 5-325 mg per tablet Take 1 Tab by mouth every four (4) hours as needed. Max Daily Amount: 6 Tabs. 5/17/16  Yes Mark Sainz MD  
omeprazole (PRILOSEC) 20 mg capsule Take 20 mg by mouth nightly as needed. Yes Provider, Historical  
docosahexanoic acid-eicosapent 120-180 mg cap  8/2/19 8/2/19  Provider, Historical  
metoprolol succinate (TOPROL-XL) 50 mg XL tablet TAKE 1 TABLET BY MOUTH ONCE DAILY 6/26/19   Provider, Historical  
moxifloxacin (VIGAMOX) 0.5 % ophthalmic solution INSTILL 1 DROP INTO LEFT EYE 4 TIMES DAILY 5/21/19   Provider, Historical  
 
 
Immunizations:  I have reviewed the patient's immunizations There is no immunization history on file for this patient. Review of Systems: A complete review of systems was performed as stated in the HPI, all others are negative. Objective: 
 
Physical Exam: 
/88 (BP 1 Location: Left arm, BP Patient Position: At rest)   Pulse 94   Temp 98.3 °F (36.8 °C) (Oral)   Resp 21   Ht 5' 1\" (1.549 m)   Wt 65.3 kg (144 lb)   SpO2 98%   BMI 27.21 kg/m² Vitals were personally reviewed Gen: no acute distress, pleasant and cooperative, sitting up in chair, able to climb to exam table w/o difficulty HEENT: normocephalic/atraumatic, PERRLA, EOMI, no scleral icterus, nasal turbinates have no erythema, no nasal polyps, no oral lesions, good dentition, Mallampati *** Neck: supple, trachea midline, no JVD, no cervical and supraclavicular adenopathy Chest: no lesions, with even rise and fall, no pectus excavatum or flail chest 
 CVS: regular rate rhythm, S1/S2, no murmurs/rubs/gallops Lungs: good air entry B/L, no wheezes/rales/rhonchi Back: no kyphosis or scoliosis Abdomen: soft, nontender, bowel sounds present, no hepatosplenomegaly Ext: no pitting edema B/L, no peripheral cyanosis or clubbing Neuro: grossly normal, AAOx3, normal strength and coordination grossly, no focal deficits Skin: no rashes, erythema, lesions Psych: normal memory, thought content, and processing Labs: I have reviewed the patient's available labs*** Outside records reviewed as follows *** Imaging:  I have personally reviewed patient's imaging*** PFTs:  I have reviewed the patient's PFTs*** 
 
TTE:  I have reviewed the patient's TTE results No results found for this or any previous visit. Assessment and Plan: 
66 y.o. female with: 
 
Impression: 1. Plan: 
- 
 
 
RTC:  
 
 
Orders Placed This Encounter  AMB POC SPIROMETRY W/BRONCHODILATOR  docosahexanoic acid-eicosapent 120-180 mg cap  VITAMIN D2 50,000 unit capsule  hydroCHLOROthiazide (MICROZIDE) 12.5 mg capsule  ketorolac (ACULAR) 0.5 % ophthalmic solution  metoprolol succinate (TOPROL-XL) 50 mg XL tablet  moxifloxacin (VIGAMOX) 0.5 % ophthalmic solution  DISCONTD: prednisoLONE acetate (PRED FORTE) 1 % ophthalmic suspension  raNITIdine hcl 150 mg capsule Bassam Garza MD/MPH Pulmonary, Critical Care Medicine [facility]

## 2019-08-02 NOTE — PROGRESS NOTES
[address] [facility], [facility]. [place] [facility]  Pulmonary, Critical Care, and Sleep Medicine    Pulmonary Office Initial Consultation  Name: Efren Kwon 66 y.o. female  MRN: [id number]  : 1955  Service Date: 19    Referring Provider: Artie Conteh MD  [address] [place] [address]  Chief Complaint:   Chief Complaint   Patient presents with    Referral / Consult     Referred by Dr. Yissel Gonzalez for SOB    Results     Heart CT 2019    Shortness of Breath       History of Present Illness:  (pt presents with friend who provides some of history)  Efren Kwon is a 66 y.o. female, who presents to Pulmonary clinic referred for abnormal CT scan, concerns for ILD by her cardiologist-Dr. Yissel Gonzalez. Pt reports dyspnea with exertion -- started 1 year ago, and for the last 2-3 months, it has progressively worsened. Dyspnea with mild exertion, sometimes at rest.  Pt's friend reports that the pt reports pt calls her to tell her that she's short of breath. MIld relief with cold air and rest.  Sx occur intermittently but are occurring more frequently. Precipitated by exertion, otherwise unable to determine what precipitates it. No other aggravating factors  No other modifying factors  Associated symptoms: wheezing, chest tightness, dyspnea, nonproductive cough  Triggers: exertion  Nocturnal awakenings: once or twice a month  Patient is a lifelong nonsmoker  Clears throat at night, and has a hx of significant heartburn -- given omeprazole, ranitidine, and famotidine. Patient is not describe any relief of symptoms yet. Patient had an appointment with GI-Dr. Hali Monroe, but they move the appointment for pulmonary evaluation.   Pt reports feeling of food getting stuck in the back of her throat -- occurs infrequently -- occurs with food and water, self-limited for about 10 minutes  Patient denies any bird exposure, hot tub, musical instrument, popcorn factory, use of amiodarone, methotrexate, nitrofurantoin, chemotherapy  Patient denies angina, orthopnea, nausea, vomiting, diarrhea, fevers, chills, night sweats, hemoptysis, voice hoarseness, LE swelling. 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 No family hx of cancer or hereditary lung disease with immediate family. Family History   Problem Relation Age of Onset    Heart Disease Mother     Arthritis-osteo Mother     Arthritis-osteo Father     Heart Surgery Neg Hx     Heart Attack Neg Hx        Social Hx: I have personally reviewed the social hx.   Social History     Socioeconomic History    Marital status:      Spouse name: Not on file    Number of children: Not on file    Years of education: Not on file    Highest education level: Not on file   Occupational History    Not on file   Social Needs    Financial resource strain: Not on file    Food insecurity:     Worry: Not on file     Inability: Not on file    Transportation needs:     Medical: Not on file     Non-medical: Not on file   Tobacco Use    Smoking status: Never Smoker    Smokeless tobacco: Never Used   Substance and Sexual Activity    Alcohol use: No     Alcohol/week: 0.0 standard drinks    Drug use: No    Sexual activity: Not on file   Lifestyle    Physical activity:     Days per week: Not on file     Minutes per session: Not on file    Stress: Not on file Relationships    Social connections:     Talks on phone: Not on file     Gets together: Not on file     Attends Scientologist service: Not on file     Active member of club or organization: Not on file     Attends meetings of clubs or organizations: Not on file     Relationship status: Not on file    Intimate partner violence:     Fear of current or ex partner: Not on file     Emotionally abused: Not on file     Physically abused: Not on file     Forced sexual activity: Not on file   Other Topics Concern    Not on file   Social History Narrative    Not on file     Allergies: I have reviewed the allergy hx  No Known Allergies    Medications:  I have reviewed the patient's medications  Prior to Admission medications    Medication Sig Start Date End Date Taking? Authorizing Provider   VITAMIN D2 50,000 unit capsule TAKE 1 CAPSULE BY MOUTH ONCE A WEEK 7/4/19  Yes Provider, Historical   hydroCHLOROthiazide (MICROZIDE) 12.5 mg capsule TAKE 1 CAPSULE BY MOUTH ONCE DAILY 7/16/19  Yes Provider, Historical   ketorolac (ACULAR) 0.5 % ophthalmic solution Administer 1 Drop to both eyes four (4) times daily. 5/1/19  Yes Provider, Historical   raNITIdine hcl 150 mg capsule Take 150 mg by mouth two (2) times a day. Yes Provider, Historical   aspirin delayed-release 81 mg tablet Take 1 Tab by mouth daily. 7/31/19  Yes Yamila Cesar MD   isosorbide mononitrate ER (IMDUR) 30 mg tablet Take 1 Tab by mouth every morning. 7/31/19  Yes Yamila Cesar MD   cyclobenzaprine (FLEXERIL) 10 mg tablet Take 10 mg by mouth three (3) times daily as needed for Muscle Spasm(s). Yes Provider, Historical   dilTIAZem CD (CARTIA XT) 120 mg ER capsule Take 120 mg by mouth daily. Yes Provider, Historical   atorvastatin (LIPITOR) 20 mg tablet Take 20 mg by mouth daily. Yes Provider, Historical   naproxen (NAPROSYN) 500 mg tablet Take 500 mg by mouth as needed.    Yes Provider, Historical   HYDROcodone-acetaminophen (NORCO) 5-325 mg per tablet Take 1 Tab by mouth every four (4) hours as needed. Max Daily Amount: 6 Tabs. 5/17/16  Yes Gil Carvalho MD   omeprazole (PRILOSEC) 20 mg capsule Take 20 mg by mouth nightly as needed. Yes Provider, Historical   docosahexanoic acid-eicosapent 120-180 mg cap  8/2/19 8/2/19  Provider, Historical   metoprolol succinate (TOPROL-XL) 50 mg XL tablet TAKE 1 TABLET BY MOUTH ONCE DAILY 6/26/19   Provider, Historical   moxifloxacin (VIGAMOX) 0.5 % ophthalmic solution INSTILL 1 DROP INTO LEFT EYE 4 TIMES DAILY 5/21/19   Provider, Historical       Immunizations:  I have reviewed the patient's immunizations    There is no immunization history on file for this patient. Review of Systems:  A complete review of systems was performed as stated in the HPI, all others are negative.       Objective:    Physical Exam:  /88 (BP 1 Location: Left arm, BP Patient Position: At rest)   Pulse 94   Temp 98.3 °F (36.8 °C) (Oral)   Resp 21   Ht 5' 1\" (1.549 m)   Wt 65.3 kg (144 lb)   SpO2 98%   BMI 27.21 kg/m²   Vitals were personally reviewed  Gen: no acute distress, pleasant and cooperative, sitting up in chair, able to climb to exam table w/ mild difficulty  HEENT: normocephalic/atraumatic, PERRLA, EOMI, no scleral icterus, nasal turbinates have no erythema, no nasal polyps, no oral lesions, fair dentition, Mallampati IV  Neck: supple, trachea midline, no JVD, no cervical and supraclavicular adenopathy  Chest: no lesions, with even rise and fall, no pectus excavatum or flail chest  CVS: regular rate rhythm, S1/S2, no murmurs/rubs/gallops  Lungs: good air entry B/L, CTABL, no wheezes/rales/rhonchi  Back: no kyphosis or scoliosis  Abdomen: soft, nontender, bowel sounds present, no hepatosplenomegaly  Ext: no pitting edema B/L, no peripheral cyanosis or clubbing  Neuro: grossly normal, AAOx3, normal strength and coordination grossly, no focal deficits  Skin: no rashes, erythema, lesions  Psych: normal memory, thought content, and processing    Labs: I have reviewed the patient's available labs  Lab Results   Component Value Date/Time    WBC 9.9 05/13/2016 03:54 PM    HGB 12.3 05/13/2016 03:54 PM    HCT 38.2 05/13/2016 03:54 PM    PLATELET 697 46/51/9692 03:54 PM    MCV 89.7 05/13/2016 03:54 PM     Lab Results   Component Value Date/Time    Sodium 146 (H) 07/03/2017 07:14 AM    Potassium 4.3 07/03/2017 07:14 AM    Chloride 105 07/03/2017 07:14 AM    CO2 25 07/03/2017 07:14 AM    Anion gap 9 05/13/2016 03:54 PM    Glucose 98 07/03/2017 07:14 AM    BUN 9 07/03/2017 07:14 AM    Creatinine 0.59 07/03/2017 07:14 AM    BUN/Creatinine ratio 15 07/03/2017 07:14 AM    GFR est  07/03/2017 07:14 AM    GFR est non- 07/03/2017 07:14 AM    Calcium 9.3 07/03/2017 07:14 AM     Records reviewed as follows, patient last seen by cardiology on 7/31/2019: Patient noted chest pain every few days, associated with exertion and rest, as well as shortness of breath. Reported cardiac symptoms are stable, started Toprol 50 mg twice daily. Patient then stopped taking Toprol to her feeling bad. Performed an echo and calcium score. CTA showed moderate calcium score as well as a suggestion of ILD, referred to pulmonary. Imaging:  I have personally reviewed patient's imaging --CT heart without contrast done on 7/30/2019 shows bilateral scattered patchy groundglass throughout all visible lung fields, upper and midlung zones seen. No subpleural fibrosis or honeycombing seen. No masses, effusions, consolidations, nodules were seen  Official report per radiology:  CT Results (most recent):  Results from Hospital Encounter encounter on 07/30/19   CT HEART W/O CONT WITH CALCIUM    Narrative RADIOLOGY REPORT:     HISTORY: Ischemic heart disease screening. Chest pain. EXAM: CT heart calcium scoring. TECHNIQUE: Spiral images of the chest were obtained according to routine cardiac  CTA protocol calcium scoring. No prior studies for comparison.  Calcium scoring  will be dictated separately. All CT scans at this facility are performed using dose optimization technique as  appropriate to a performed exam, to include automated exposure control,  adjustment of the mA and/or kV according to patient size (including appropriate  matching for site-specific examinations), or use of iterative reconstruction  technique. FINDINGS: Limited evaluation of the chest due to narrow filled a few as well as  lack of intravenous contrast.    Air trapping is demonstrated in the lung bases. Limited evaluation. No pneumothorax, pneumonia or pleural effusions are seen. Limited visualization  of trachea bronchial tree shows no abnormality. No large mass lesion or  pathologic lymphadenopathy is noted. This evaluation is limited due to lack of  intravenous contrast. Heart is enlarged. No pericardial effusions are noted. Limited evaluation of bony thorax shows no lytic or blastic lesions. Impression IMPRESSION:    1. Air trapping in the lung bases suggesting presence of interstitial lung  disease. Limited evaluation. Follow-up with CT of the thorax is recommended    The final Radiology portion of this exam was provided by Dr. Amairani Peoples on  7/30/2019 10:48 AM.    The final Cardiology report will follow. *END RADIOLOGY PORTION OF REPORT*    CARDIOLOGY REPORT:      The patient underwent a limited noncontrast CT scan of her chest with cardiac  gating to evaluate for coronary arterial calcification. Using the Agatston  method the coronary calcium score was calculated. There is a large amount of  coronary calcification present in all 3 major coronary vessels. Coronary calcium  score calculated 393. Impression. Coronary calcium score 393.     For a more individualized assessment of risk, consider comparing the individual  score with that of other persons of the same age, sex and ethnicity, through the  calcium percentile, available at https://www.telma-young.org/. aspx . In the 2013 ACC/AHA guideline on the assessment of cardiovascular risk, for  those individuals in whom risk assessment is uncertain after using the 10 year  risk for atherosclerotic cardiovascular disease equation, a coronary artery  calcium score > 300 or > 75th percentile for age, sex and ethnicity can be  considered in the decision making, and supports revising risk assessment upward. The final Cardiology portion of this exam was provided by Dr. Yefri Burch on  7/30/2019 10:48 AM.       PFTs:  I have reviewed the patient's PFTs done today as follows: Spirometry is suggestive of a restrictive defect, no BD response. TTE:  I have reviewed the patient's TTE results  07/26/19   ECHO ADULT COMPLETE 07/26/2019 7/26/2019    Narrative · Left Ventricle: Normal cavity size and systolic function (ejection   fraction normal). Mild concentric hypertrophy. Estimated left ventricular   ejection fraction is 61 - 65%. No regional wall motion abnormality noted. Mild (grade 1) left ventricular diastolic dysfunction. · Right Ventricle: Normal right ventricular size and function. · No hemodynamically significant valvular pathology. · Pericardium: A circumferential pericardial effusion with a pericardial   fat pad was present. Signed by: Velasquez Price MD       Assessment and Plan:  66 y.o. female with:    Impression:  1. Dyspnea/shortness of breath: Etiology most likely due to ILD given extensive cardiac work-up that is mainly unremarkable. There may be a mild component of deconditioning, but appears to be mainly driven by ILD given extensive groundglass opacities  2. Interstitial lung disease: Based on lung windows of CT heart without contrast, scattered groundglass infiltrates seen through all lung fields. Patient works as a seamstress, exposed to [employer], otherwise no other causative exposures.   No evidence of subpleural fibrosis or honeycombing. Differential diagnosis remains extensive-bronchiolitis obliterans, HP, NSIP, etc.  3.  Restrictive lung disease  4. Overweight: Body mass index is 27.21 kg/m². 5.  Dysphagia: Etiology unclear    Plan:  -Reviewed CT scan with patient and her friend. Went over potential etiologies of dyspnea and reviewed ILD with the patient. At this point, because of ILD is unclear. I advised her that we will complete a work-up including extensive serologies and imaging before proceeding with the potential bronchoscopy or surgical lung biopsy.  -Obtain CT chest without contrast-high-resolution protocol  -Obtain lab work: CTD ILD panel, ANCA, hypersensitivity pneumonitis panel, ACE level, QuantiFERON  -Complete PFTs at next visit, lung volumes and DLCO  -Trial of albuterol HFA 1 to 2 puffs every 4 hours as needed. Proper inhaler technique counseled  -6-minute walk test performed in clinic today, patient ambulated 264.5 m, no significant oxygen desaturations were encountered, lowest SPO2 was 95% on room air  -Strongly advised patient to follow-up with GI regarding globus sensation/dysphagia and severe GERD. This may make ILD worse. -Advised patient remain active  -Reassurance provided    Follow-up and Dispositions    · Return in about 3 weeks (around 8/23/2019).          Orders Placed This Encounter    AMB POC SPIROMETRY W/BRONCHODILATOR    GAS DILUT/WASHOUT LUNG VOL W/WO DISTRIB VENT&VOL    DIFFUSING CAPACITY    PRO PULMONARY STRESS TESTING    QUANTIFERON-TB GOLD PLUS    CT CHEST WO CONT    HYPERSENS PNEUMONITIS I    ANCA PANEL    RHEUMATOID FACTOR, QL    SCLERODERMA (SCL-70) AB, IGG    SED RATE (ESR)    ANGIOTENSIN CONVERTING ENZYME    C REACTIVE PROTEIN, QT    CK    SJOGREN'S ABS, SSA AND SSB    JOURDAN COMPREHENSIVE PLUS PANEL    RHEUMASSURE    albuterol (PROVENTIL HFA, VENTOLIN HFA, PROAIR HFA) 90 mcg/actuation inhaler         Irene Dooley MD/MPH     Pulmonary, Critical Care Medicine  [facility]

## 2019-08-02 NOTE — PROGRESS NOTES
Te Colindres presents today for   Chief Complaint   Patient presents with   [place] Referral / Consult     Referred by Dr. Pio Maher for SOB    Results     Heart CT 7/8/2019       Is someone accompanying this pt? Yes. Friend    Is the patient using any DME equipment during OV? No   -DME Company N/A    Depression Screening:  3 most recent PHQ Screens 6/26/2019   Little interest or pleasure in doing things Not at all   Feeling down, depressed, irritable, or hopeless Not at all   Total Score PHQ 2 0       Learning Assessment:  Learning Assessment 6/30/2017   PRIMARY LEARNER Patient   PRIMARY LANGUAGE ENGLISH   LEARNER PREFERENCE PRIMARY LISTENING   ANSWERED BY patient   RELATIONSHIP SELF       Abuse Screening:  No flowsheet data found. Fall Risk  No flowsheet data found. Coordination of Care:  1. Have you been to the ER, urgent care clinic since your last visit? Hospitalized since your last visit? No    2. Have you seen or consulted any other health care providers outside of the [address] [place] [address] since your last visit? Include any pap smears or colon screening.  Dr. Marilin Felix, ENT    Six Minute Walk Test (6MWT) recording form    Te Colindres       [id number]                                    1955 female  [id number]    [x]  Medical history checked  [x]  Medical clearance provided for the patient to participate in exercise testing      Contraindications to 6MWT:  [] Resting heart rate > 120 beats / min after 10 minutes rest (relative contraindication)  [] Systolic blood pressure > 180 mm Hg +/- diastolic blood pressure > 100 mm Hg (relative contraindication)  [] Resting SpO2 < 85% on room air or on prescribed level of supplemental oxygen  [] Physical disability preventing safe performance  [] No contraindications identified             6MWT        8/2/2019  10:41 AM       Supplemental Oxygen  Mobility Aid       Time Mins BP SP02 HR RPE Distance Walked Rests/Comments   Rest 120/88 98 % 94 21  SOB-0/10   1  97 % 96   SOB-0/10   2  97 % 97   SOB-0/10   3  95 % 97   SOB-0/10   4  95 % 97   SOB-0/10   5  96 % 98   SOB-0/10   6  98 % 98   SOB-0/10   Recovery 1 118/58 98 5 97 20  SOB-0/10   Recovery 2 110/64 97 % 94 20  SOB-0/10     Total distance:   263.5 meters            Symptom recovery:   SOB 0/10            HR recovery:     94-97 BPM                                        Limiting factor:      None                                    Was test terminated: [x] No   [] Yes  If yes, when? 6MWT Termination Criteria:  [] Chest pain or angina-like symptons  [] Heart rate > Predicted HR max.   [] Evolving mental confusioin, light-headedness or incoordination  [] Physical or verbal severe fatigue [] Intolerable dyspnea, unrelieved by rest  [] Persistent SP02 < 85% (Note pending clinical presentation)  [] Abnormal gait pattern (leg cramps, staggering, ataxia)  [] Other clinically warranted reason     INTERPRETATION:  See MD progress notes    Comparision 6 min walk distance:      RECOMMENDATION:  See MD progress notes    Srinivas Chappell LPN

## 2019-08-03 DIAGNOSIS — J84.9 ILD (INTERSTITIAL LUNG DISEASE) (HCC): ICD-10-CM

## 2019-08-03 DIAGNOSIS — R06.09 DYSPNEA ON EXERTION: ICD-10-CM

## 2019-08-03 DIAGNOSIS — J98.4 RESTRICTIVE LUNG DISEASE: ICD-10-CM

## 2019-08-03 DIAGNOSIS — R06.02 SOB (SHORTNESS OF BREATH): ICD-10-CM

## 2019-08-03 DIAGNOSIS — R91.8 PULMONARY INFILTRATES: ICD-10-CM

## 2019-08-05 ENCOUNTER — TELEPHONE (OUTPATIENT)
Dept: PULMONOLOGY | Age: 64
End: 2019-08-05

## 2019-08-05 NOTE — TELEPHONE ENCOUNTER
Pt friend Olga Arellano states pt wants to inform us that she is experiencing a lot of discomfort with new inhaler. Pt doesn't speak good english, will communicate w/ friend Olga Arellano (listed as person to release info to). Please advise [phone].

## 2019-08-05 NOTE — TELEPHONE ENCOUNTER
The pt. was ordered Albuterol MDI. She was under the misunderstanding to use it all of the time. Aleida is told to have it used only when needed for increased SOB, chest tightness and wheezing. If the pt. Is using it every day for a problem, then please call us. Both the pt. And her friend are quite concerned about her lungs and wishing to see the MD more often. She is having a CT Scan 8/13 and they wish to know the results ASAP. There are no openings therefore Dr. Mat Llanes will be given a message to call with the results.

## 2019-08-07 ENCOUNTER — DOCUMENTATION ONLY (OUTPATIENT)
Dept: PULMONOLOGY | Age: 64
End: 2019-08-07

## 2019-08-07 NOTE — PROGRESS NOTES
CWSY#[id number] for CT scheduled at [place] [id number] for 8/9/19 at 1:45-auth valid from 8/7/19-9/5/19.

## 2019-08-09 LAB
ACE SERPL-CCNC: 34 U/L (ref 14–82)
C-ANCA TITR SER IF: NORMAL TITER
CENTROMERE B AB SER-ACNC: <0.2 AI (ref 0–0.9)
CHROMATIN AB SERPL-ACNC: <0.2 AI (ref 0–0.9)
CK SERPL-CCNC: 869 U/L (ref 24–173)
CRP SERPL-MCNC: 68 MG/L (ref 0–10)
DSDNA AB SER-ACNC: <1 IU/ML (ref 0–9)
ENA JO1 AB SER-ACNC: <0.2 AI (ref 0–0.9)
ENA RNP AB SER-ACNC: <0.2 AI (ref 0–0.9)
ENA SCL70 AB SER-ACNC: <0.2 AI (ref 0–0.9)
ENA SM AB SER-ACNC: <0.2 AI (ref 0–0.9)
ENA SM+RNP AB SER-ACNC: <0.2 AI (ref 0–0.9)
ENA SS-A AB SER-ACNC: <0.2 AI (ref 0–0.9)
ENA SS-B AB SER-ACNC: <0.2 AI (ref 0–0.9)
ERYTHROCYTE [SEDIMENTATION RATE] IN BLOOD BY WESTERGREN METHOD: 27 MM/HR (ref 0–40)
GAMMA INTERFERON BACKGROUND BLD IA-ACNC: 0.12 IU/ML
M TB IFN-G BLD-IMP: POSITIVE
M TB IFN-G CD4+ BCKGRND COR BLD-ACNC: 1.01 IU/ML
MITOGEN IGNF BLD-ACNC: >10 IU/ML
MYELOPEROXIDASE AB SER IA-ACNC: <9 U/ML (ref 0–9)
P-ANCA ATYPICAL TITR SER IF: NORMAL TITER
P-ANCA TITR SER IF: NORMAL TITER
PROTEINASE3 AB SER IA-ACNC: <3.5 U/ML (ref 0–3.5)
QUANTIFERON INCUBATION, QF1T: ABNORMAL
QUANTIFERON TB2 AG: 0.86 IU/ML
RHEUMATOID FACT SERPL-ACNC: <10 IU/ML (ref 0–13.9)
RIBOSOMAL P AB SER-ACNC: <0.2 AI (ref 0–0.9)
SEE BELOW:, 164879: NORMAL
SERVICE CMNT-IMP: ABNORMAL

## 2019-08-14 ENCOUNTER — HOSPITAL ENCOUNTER (EMERGENCY)
Age: 64
Discharge: HOME | End: 2019-08-14
Attending: EMERGENCY MEDICINE
Payer: COMMERCIAL

## 2019-08-14 ENCOUNTER — APPOINTMENT (OUTPATIENT)
Dept: GENERAL RADIOLOGY | Age: 64
End: 2019-08-14
Attending: EMERGENCY MEDICINE
Payer: COMMERCIAL

## 2019-08-14 VITALS
SYSTOLIC BLOOD PRESSURE: 104 MMHG | DIASTOLIC BLOOD PRESSURE: 73 MMHG | HEART RATE: 93 BPM | WEIGHT: 140 LBS | TEMPERATURE: 97.8 F | RESPIRATION RATE: 17 BRPM | HEIGHT: 61 IN | OXYGEN SATURATION: 99 % | BODY MASS INDEX: 26.43 KG/M2

## 2019-08-14 DIAGNOSIS — R06.02 SOB (SHORTNESS OF BREATH): Primary | ICD-10-CM

## 2019-08-14 DIAGNOSIS — E87.6 HYPOKALEMIA: ICD-10-CM

## 2019-08-14 LAB
ALBUMIN SERPL-MCNC: 3.6 G/DL (ref 3.4–5)
ALBUMIN/GLOB SERPL: 1 {RATIO} (ref 0.8–1.7)
ALP SERPL-CCNC: 103 U/L (ref 45–117)
ALT SERPL-CCNC: 29 U/L (ref 13–56)
ANION GAP SERPL CALC-SCNC: 8 MMOL/L (ref 3–18)
AST SERPL-CCNC: 19 U/L (ref 10–38)
ATRIAL RATE: 82 BPM
BASOPHILS # BLD: 0 K/UL (ref 0–0.1)
BASOPHILS NFR BLD: 0 % (ref 0–2)
BILIRUB SERPL-MCNC: 0.7 MG/DL (ref 0.2–1)
BUN SERPL-MCNC: 10 MG/DL (ref 7–18)
BUN/CREAT SERPL: 12 (ref 12–20)
CALCIUM SERPL-MCNC: 8.7 MG/DL (ref 8.5–10.1)
CALCULATED P AXIS, ECG09: 48 DEGREES
CALCULATED R AXIS, ECG10: -57 DEGREES
CALCULATED T AXIS, ECG11: 38 DEGREES
CHLORIDE SERPL-SCNC: 103 MMOL/L (ref 100–111)
CO2 SERPL-SCNC: 28 MMOL/L (ref 21–32)
CREAT SERPL-MCNC: 0.85 MG/DL (ref 0.6–1.3)
DIAGNOSIS, 93000: NORMAL
DIFFERENTIAL METHOD BLD: ABNORMAL
EOSINOPHIL # BLD: 0.2 K/UL (ref 0–0.4)
EOSINOPHIL NFR BLD: 1 % (ref 0–5)
ERYTHROCYTE [DISTWIDTH] IN BLOOD BY AUTOMATED COUNT: 13 % (ref 11.6–14.5)
GLOBULIN SER CALC-MCNC: 3.7 G/DL (ref 2–4)
GLUCOSE SERPL-MCNC: 128 MG/DL (ref 74–99)
HCT VFR BLD AUTO: 41.7 % (ref 35–45)
HGB BLD-MCNC: 14.3 G/DL (ref 12–16)
LYMPHOCYTES # BLD: 2 K/UL (ref 0.9–3.6)
LYMPHOCYTES NFR BLD: 17 % (ref 21–52)
MAGNESIUM SERPL-MCNC: 1.9 MG/DL (ref 1.6–2.6)
MCH RBC QN AUTO: 29.5 PG (ref 24–34)
MCHC RBC AUTO-ENTMCNC: 34.3 G/DL (ref 31–37)
MCV RBC AUTO: 86.2 FL (ref 74–97)
MONOCYTES # BLD: 0.8 K/UL (ref 0.05–1.2)
MONOCYTES NFR BLD: 7 % (ref 3–10)
NEUTS SEG # BLD: 8.9 K/UL (ref 1.8–8)
NEUTS SEG NFR BLD: 75 % (ref 40–73)
P-R INTERVAL, ECG05: 214 MS
PLATELET # BLD AUTO: 374 K/UL (ref 135–420)
PMV BLD AUTO: 9.2 FL (ref 9.2–11.8)
POTASSIUM SERPL-SCNC: 3.3 MMOL/L (ref 3.5–5.5)
PROT SERPL-MCNC: 7.3 G/DL (ref 6.4–8.2)
Q-T INTERVAL, ECG07: 376 MS
QRS DURATION, ECG06: 80 MS
QTC CALCULATION (BEZET), ECG08: 439 MS
RBC # BLD AUTO: 4.84 M/UL (ref 4.2–5.3)
SODIUM SERPL-SCNC: 139 MMOL/L (ref 136–145)
VENTRICULAR RATE, ECG03: 82 BPM
WBC # BLD AUTO: 11.9 K/UL (ref 4.6–13.2)

## 2019-08-14 PROCEDURE — 85025 COMPLETE CBC W/AUTO DIFF WBC: CPT

## 2019-08-14 PROCEDURE — 99285 EMERGENCY DEPT VISIT HI MDM: CPT

## 2019-08-14 PROCEDURE — 74011250636 HC RX REV CODE- 250/636: Performed by: PHYSICIAN ASSISTANT

## 2019-08-14 PROCEDURE — 74011636637 HC RX REV CODE- 636/637: Performed by: EMERGENCY MEDICINE

## 2019-08-14 PROCEDURE — 93005 ELECTROCARDIOGRAM TRACING: CPT

## 2019-08-14 PROCEDURE — 94640 AIRWAY INHALATION TREATMENT: CPT

## 2019-08-14 PROCEDURE — 74011000250 HC RX REV CODE- 250: Performed by: EMERGENCY MEDICINE

## 2019-08-14 PROCEDURE — 74011250637 HC RX REV CODE- 250/637: Performed by: PHYSICIAN ASSISTANT

## 2019-08-14 PROCEDURE — 83735 ASSAY OF MAGNESIUM: CPT

## 2019-08-14 PROCEDURE — 80053 COMPREHEN METABOLIC PANEL: CPT

## 2019-08-14 PROCEDURE — 71045 X-RAY EXAM CHEST 1 VIEW: CPT

## 2019-08-14 PROCEDURE — 96360 HYDRATION IV INFUSION INIT: CPT

## 2019-08-14 RX ORDER — NAPROXEN 250 MG/1
500 TABLET ORAL
Status: COMPLETED | OUTPATIENT
Start: 2019-08-14 | End: 2019-08-14

## 2019-08-14 RX ORDER — ALBUTEROL SULFATE 0.83 MG/ML
2.5 SOLUTION RESPIRATORY (INHALATION)
Status: COMPLETED | OUTPATIENT
Start: 2019-08-14 | End: 2019-08-14

## 2019-08-14 RX ORDER — PREDNISONE 20 MG/1
60 TABLET ORAL DAILY
Qty: 15 TAB | Refills: 0 | Status: SHIPPED | OUTPATIENT
Start: 2019-08-14 | End: 2019-08-19

## 2019-08-14 RX ORDER — PREDNISONE 20 MG/1
60 TABLET ORAL
Status: COMPLETED | OUTPATIENT
Start: 2019-08-14 | End: 2019-08-14

## 2019-08-14 RX ORDER — POTASSIUM CHLORIDE 20 MEQ/1
40 TABLET, EXTENDED RELEASE ORAL
Status: COMPLETED | OUTPATIENT
Start: 2019-08-14 | End: 2019-08-14

## 2019-08-14 RX ADMIN — PREDNISONE 60 MG: 20 TABLET ORAL at 09:17

## 2019-08-14 RX ADMIN — ALBUTEROL SULFATE 2.5 MG: 2.5 SOLUTION RESPIRATORY (INHALATION) at 09:17

## 2019-08-14 RX ADMIN — NAPROXEN 500 MG: 250 TABLET ORAL at 11:15

## 2019-08-14 RX ADMIN — POTASSIUM CHLORIDE 40 MEQ: 20 TABLET, EXTENDED RELEASE ORAL at 10:59

## 2019-08-14 RX ADMIN — SODIUM CHLORIDE 1000 ML: 900 INJECTION, SOLUTION INTRAVENOUS at 10:58

## 2019-08-14 NOTE — DISCHARGE INSTRUCTIONS

## 2019-08-14 NOTE — ED NOTES
Patient was given discharge instruction and went over all paperwork. Patient signed discharge paperwork. Acknowledged understanding of all instructions. Discharge instructions provided by [place] MACHUCA.

## 2019-08-14 NOTE — ED PROVIDER NOTES
[facility]  [facility] EMERGENCY DEPT      9:27 AM    Date: 8/14/2019  Patient Name: Regine Pruett    History of Presenting Illness     66 y.o. female with noted past medical history including Restrictive lung disease with persistent SOB who is currently being evaluated by Dr. Kirsten Marcus presents the ED complaining of shortness of breath for the past month. Patient states that she is not coughing or having any chest pain. She states this feels typical of her usual shortness of breath, just a bit worse today. She has an appointment with Dr. Kirsten Marcus tomorrow. She denies any fever, chills, cough, leg pain or swelling, hemoptysis, other symptoms at this time. Patient denies any other associated signs or symptoms. Patient denies any other complaints. Nursing notes regarding the HPI and triage nursing notes were reviewed. Prior medical records were reviewed. Current Outpatient Medications   Medication Sig Dispense Refill    predniSONE (DELTASONE) 20 mg tablet Take 60 mg by mouth daily for 5 days. With Breakfast 15 Tab 0    VITAMIN D2 50,000 unit capsule TAKE 1 CAPSULE BY MOUTH ONCE A WEEK  5    hydroCHLOROthiazide (MICROZIDE) 12.5 mg capsule TAKE 1 CAPSULE BY MOUTH ONCE DAILY  3    ketorolac (ACULAR) 0.5 % ophthalmic solution Administer 1 Drop to both eyes four (4) times daily. 0    metoprolol succinate (TOPROL-XL) 50 mg XL tablet TAKE 1 TABLET BY MOUTH ONCE DAILY  3    moxifloxacin (VIGAMOX) 0.5 % ophthalmic solution INSTILL 1 DROP INTO LEFT EYE 4 TIMES DAILY  0    raNITIdine hcl 150 mg capsule Take 150 mg by mouth two (2) times a day.  albuterol (PROVENTIL HFA, VENTOLIN HFA, PROAIR HFA) 90 mcg/actuation inhaler Take 1-2 Puffs by inhalation every four (4) hours as needed for Wheezing or Shortness of Breath. 1 Inhaler 4    aspirin delayed-release 81 mg tablet Take 1 Tab by mouth daily. 100 Tab 1    isosorbide mononitrate ER (IMDUR) 30 mg tablet Take 1 Tab by mouth every morning.  30 Tab 6    cyclobenzaprine (FLEXERIL) 10 mg tablet Take 10 mg by mouth three (3) times daily as needed for Muscle Spasm(s).  dilTIAZem CD (CARTIA XT) 120 mg ER capsule Take 120 mg by mouth daily.  atorvastatin (LIPITOR) 20 mg tablet Take 20 mg by mouth daily.  naproxen (NAPROSYN) 500 mg tablet Take 500 mg by mouth as needed.  HYDROcodone-acetaminophen (NORCO) 5-325 mg per tablet Take 1 Tab by mouth every four (4) hours as needed. Max Daily Amount: 6 Tabs. 30 Tab 0    omeprazole (PRILOSEC) 20 mg capsule Take 20 mg by mouth nightly as needed. Past History     Past Medical History:  Past Medical History:   Diagnosis Date    Anxiety     Arthritis     GERD (gastroesophageal reflux disease)     Hypertension        Past Surgical History:  Past Surgical History:   Procedure Laterality Date    HX CHOLECYSTECTOMY  2015    HX HYSTERECTOMY      HX OTHER SURGICAL      parotid    HX OTHER SURGICAL      Salivary gland surgery        Family History:  Family History   Problem Relation Age of Onset    Heart Disease Mother     Arthritis-osteo Mother     Arthritis-osteo Father     Heart Surgery Neg Hx     Heart Attack Neg Hx        Social History:  Social History     Tobacco Use    Smoking status: Never Smoker    Smokeless tobacco: Never Used   Substance Use Topics    Alcohol use: No     Alcohol/week: 0.0 standard drinks    Drug use: No       Allergies:  No Known Allergies    Patient's primary care provider (as noted in EPIC): Mannie Lawrence MD    Review of Systems   Constitutional:  Denies malaise, fever, chills. Chest:  Denies injury. Cardiac:  Denies chest pain or palpitations. Respiratory: + SOB. Denies cough, wheezing. Extremity/MS:  Denies injury or pain. Skin: Denies injury, rash, itching or skin changes. All other systems negative as reviewed.      Visit Vitals  /73   Pulse 93   Temp 97.8 °F (36.6 °C)   Resp 17   Ht 5' 1\" (1.549 m)   Wt 63.5 kg (140 lb)   SpO2 99%   BMI 26.45 kg/m²       PHYSICAL EXAM:    CONSTITUTIONAL:  Alert, in no apparent distress;  well developed;  well nourished. HEAD:  Normocephalic, atraumatic. EYES:  EOMI. Non-icteric sclera. Normal conjunctiva. ENTM: Mouth: mucous membranes moist.  NECK:  Supple   RESPIRATORY:  Chest clear, equal breath sounds, good air movement. Without wheezes, rhonchi, rales. CARDIOVASCULAR:  Regular rate and rhythm. No murmurs, rubs, or gallops. NEURO:  Moves all four extremities, and grossly normal motor exam.  SKIN:  No rashes;  Normal for age. PSYCH:  Alert and normal affect. DIFFERENTIAL DIAGNOSES/ MEDICAL DECISION MAKING:   Exacerbation of her chronic restrictive lung disease, chronic obstructive pulmonary disease (COPD), acute asthma exacerbation, pneumonia, acute bronchitis, upper respiratory infection.       ED COURSE:      Recent Results (from the past 12 hour(s))   EKG, 12 LEAD, INITIAL    Collection Time: 08/14/19  9:01 AM   Result Value Ref Range    Ventricular Rate 82 BPM    Atrial Rate 82 BPM    P-R Interval 214 ms    QRS Duration 80 ms    Q-T Interval 376 ms    QTC Calculation (Bezet) 439 ms    Calculated P Axis 48 degrees    Calculated R Axis -57 degrees    Calculated T Axis 38 degrees    Diagnosis       Sinus rhythm with 1st degree AV block  Left axis deviation  Low voltage QRS  Cannot rule out Anterior infarct (cited on or before 13-MAY-2016)  Abnormal ECG  When compared with ECG of 13-MAY-2016 16:06,  premature ventricular complexes are no longer present  QRS axis shifted left     CBC WITH AUTOMATED DIFF    Collection Time: 08/14/19  9:26 AM   Result Value Ref Range    WBC 11.9 4.6 - 13.2 K/uL    RBC 4.84 4.20 - 5.30 M/uL    HGB 14.3 12.0 - 16.0 g/dL    HCT 41.7 35.0 - 45.0 %    MCV 86.2 74.0 - 97.0 FL    MCH 29.5 24.0 - 34.0 PG    MCHC 34.3 31.0 - 37.0 g/dL    RDW 13.0 11.6 - 14.5 %    PLATELET 946 926 - 989 K/uL    MPV 9.2 9.2 - 11.8 FL    NEUTROPHILS 75 (H) 40 - 73 %    LYMPHOCYTES 17 (L) 21 - 52 % MONOCYTES 7 3 - 10 %    EOSINOPHILS 1 0 - 5 %    BASOPHILS 0 0 - 2 %    ABS. NEUTROPHILS 8.9 (H) 1.8 - 8.0 K/UL    ABS. LYMPHOCYTES 2.0 0.9 - 3.6 K/UL    ABS. MONOCYTES 0.8 0.05 - 1.2 K/UL    ABS. EOSINOPHILS 0.2 0.0 - 0.4 K/UL    ABS. BASOPHILS 0.0 0.0 - 0.1 K/UL    DF AUTOMATED     METABOLIC PANEL, COMPREHENSIVE    Collection Time: 08/14/19  9:26 AM   Result Value Ref Range    Sodium 139 136 - 145 mmol/L    Potassium 3.3 (L) 3.5 - 5.5 mmol/L    Chloride 103 100 - 111 mmol/L    CO2 28 21 - 32 mmol/L    Anion gap 8 3.0 - 18 mmol/L    Glucose 128 (H) 74 - 99 mg/dL    BUN 10 7.0 - 18 MG/DL    Creatinine 0.85 0.6 - 1.3 MG/DL    BUN/Creatinine ratio 12 12 - 20      GFR est AA >60 >60 ml/min/1.73m2    GFR est non-AA >60 >60 ml/min/1.73m2    Calcium 8.7 8.5 - 10.1 MG/DL    Bilirubin, total 0.7 0.2 - 1.0 MG/DL    ALT (SGPT) 29 13 - 56 U/L    AST (SGOT) 19 10 - 38 U/L    Alk. phosphatase 103 45 - 117 U/L    Protein, total 7.3 6.4 - 8.2 g/dL    Albumin 3.6 3.4 - 5.0 g/dL    Globulin 3.7 2.0 - 4.0 g/dL    A-G Ratio 1.0 0.8 - 1.7     MAGNESIUM    Collection Time: 08/14/19  9:26 AM   Result Value Ref Range    Magnesium 1.9 1.6 - 2.6 mg/dL      Xr Chest Port    Result Date: 8/14/2019  AP CHEST, PORTABLE INDICATION: Shortness of breath COMPARISON: Chest x-ray 5/26/2016 FINDINGS:  EKG leads overlie the patient. Stable mildly enlarged cardiac silhouette. The pulmonary vasculature is unremarkable. No focal consolidation, pleural effusion, or pneumothorax. No acute osseous abnormalities are identified. Surgical clips project at the right upper abdomen. Impression:  Mildly enlarged cardiac silhouette. No acute pulmonary finding. EKG: Sinus rhythm with 1st degree AV block, 82bpm; No STEMI.      IMPRESSION AND MEDICAL DECISION MAKING:  Based upon the patient's presentation with noted HPI and PE, along with the work up done in the emergency department, I believe that the patient is having dyspnea likely from her chronic restrictive lung disease. She notes feeling much improved after receiving albuterol and prednisone. States she is now feeling back to her baseline. Patient has an appointment with her pulmonologist, Dr. Ed Morales, tomorrow. She will follow-up with him without fail. She also has some minimal hypokalemia, given a dose of K-Dur here mag is within normal limits. Diagnosis:   1. SOB (shortness of breath)    2. Hypokalemia      Disposition: Discharge    Follow-up Information     Follow up With Specialties Details Why Contact Info    Kaley Burch MD Pulmonary Disease, Internal Medicine In 3 days  [address] [facility]      [facility] EMERGENCY DEPT Emergency Medicine  If symptoms worsen [address] [facility] [address]  [phone]          Discharge Medication List as of 8/14/2019 11:17 AM      START taking these medications    Details   predniSONE (DELTASONE) 20 mg tablet Take 60 mg by mouth daily for 5 days. With Breakfast, Print, Disp-15 Tab, R-0         CONTINUE these medications which have NOT CHANGED    Details   VITAMIN D2 50,000 unit capsule TAKE 1 CAPSULE BY MOUTH ONCE A WEEK, Historical Med, R-5, HUONG      hydroCHLOROthiazide (MICROZIDE) 12.5 mg capsule TAKE 1 CAPSULE BY MOUTH ONCE DAILY, Historical Med, R-3      ketorolac (ACULAR) 0.5 % ophthalmic solution Administer 1 Drop to both eyes four (4) times daily. , Historical Med, R-0      metoprolol succinate (TOPROL-XL) 50 mg XL tablet TAKE 1 TABLET BY MOUTH ONCE DAILY, Historical Med, R-3      moxifloxacin (VIGAMOX) 0.5 % ophthalmic solution INSTILL 1 DROP INTO LEFT EYE 4 TIMES DAILY, Historical Med, R-0      raNITIdine hcl 150 mg capsule Take 150 mg by mouth two (2) times a day., Historical Med      albuterol (PROVENTIL HFA, VENTOLIN HFA, PROAIR HFA) 90 mcg/actuation inhaler Take 1-2 Puffs by inhalation every four (4) hours as needed for Wheezing or Shortness of Breath., Normal, Disp-1 Inhaler, R-4      aspirin delayed-release 81 mg tablet Take 1 Tab by mouth daily. , Normal, Disp-100 Tab, R-1      isosorbide mononitrate ER (IMDUR) 30 mg tablet Take 1 Tab by mouth every morning., Normal, Disp-30 Tab, R-6      cyclobenzaprine (FLEXERIL) 10 mg tablet Take 10 mg by mouth three (3) times daily as needed for Muscle Spasm(s). , Historical Med      dilTIAZem CD (CARTIA XT) 120 mg ER capsule Take 120 mg by mouth daily. , Historical Med      atorvastatin (LIPITOR) 20 mg tablet Take 20 mg by mouth daily. , Historical Med      naproxen (NAPROSYN) 500 mg tablet Take 500 mg by mouth as needed., Historical Med      HYDROcodone-acetaminophen (NORCO) 5-325 mg per tablet Take 1 Tab by mouth every four (4) hours as needed.  Max Daily Amount: 6 Tabs., Print, Disp-30 Tab, R-0      omeprazole (PRILOSEC) 20 mg capsule Take 20 mg by mouth nightly as needed., Historical Med           NELLA Carolina

## 2019-08-15 ENCOUNTER — OFFICE VISIT (OUTPATIENT)
Dept: PULMONOLOGY | Age: 64
End: 2019-08-15

## 2019-08-15 VITALS
TEMPERATURE: 98.3 F | WEIGHT: 142 LBS | DIASTOLIC BLOOD PRESSURE: 76 MMHG | HEIGHT: 61 IN | HEART RATE: 109 BPM | OXYGEN SATURATION: 97 % | SYSTOLIC BLOOD PRESSURE: 130 MMHG | RESPIRATION RATE: 18 BRPM | BODY MASS INDEX: 26.81 KG/M2

## 2019-08-15 DIAGNOSIS — R79.82 ELEVATED C-REACTIVE PROTEIN (CRP): ICD-10-CM

## 2019-08-15 DIAGNOSIS — J84.9 ILD (INTERSTITIAL LUNG DISEASE) (HCC): Primary | ICD-10-CM

## 2019-08-15 DIAGNOSIS — R06.02 SHORTNESS OF BREATH: ICD-10-CM

## 2019-08-15 DIAGNOSIS — R06.09 DYSPNEA ON EXERTION: ICD-10-CM

## 2019-08-15 DIAGNOSIS — R74.8 ELEVATED CREATINE KINASE: ICD-10-CM

## 2019-08-15 DIAGNOSIS — R91.8 PULMONARY INFILTRATES: ICD-10-CM

## 2019-08-15 DIAGNOSIS — R76.12 POSITIVE QUANTIFERON-TB GOLD TEST: ICD-10-CM

## 2019-08-15 RX ORDER — BUDESONIDE AND FORMOTEROL FUMARATE DIHYDRATE 160; 4.5 UG/1; UG/1
2 AEROSOL RESPIRATORY (INHALATION) 2 TIMES DAILY
Qty: 1 INHALER | Refills: 0 | Status: SHIPPED | COMMUNITY
Start: 2019-08-15 | End: 2019-09-04 | Stop reason: SDUPTHER

## 2019-08-15 RX ORDER — BUDESONIDE AND FORMOTEROL FUMARATE DIHYDRATE 160; 4.5 UG/1; UG/1
2 AEROSOL RESPIRATORY (INHALATION) 2 TIMES DAILY
Qty: 1 INHALER | Refills: 6 | Status: SHIPPED | OUTPATIENT
Start: 2019-08-15 | End: 2021-04-21

## 2019-08-15 NOTE — PROGRESS NOTES
[facility] [place] [facility]  Pulmonary, Critical Care, and Sleep Medicine    Pulmonary Office follow-up  Name: Kayla Yi 66 y.o. female  MRN: [id number]  : 1955  Service Date: 08/15/19  Chief Complaint:   Chief Complaint   Patient presents with    Follow-up     CT done-- Labs done        History of Present Illness:  (pt presents with friend who provides some of history)  Kayla Yi is a 66 y.o. female, who presents to Pulmonary clinic for follow-up with regards to dyspnea. Patient was last seen in our clinic on 2019. In the interval, patient was having intermittent dyspnea, dizziness, tingling feet, for a few days on and off, so pt went to the ER. Pt was dischcarged on prednisone 60mg x 7 days. Pt reports some mild improvement to her symptoms. Pt using PRN albuterol 3x per day, with some relief. Patient still has dyspnea with mild exertion. No drastic change in her symptoms from 2 weeks ago. Symptoms still alleviated with rest and blowing cold air. No other modifying factors  Associated symptoms: wheezing, chest tightness, dyspnea, nonproductive cough  Patient denies any bird exposure, hot tub, musical instrument, popcorn factory, use of amiodarone, methotrexate, nitrofurantoin, chemotherapy  Patient denies angina, orthopnea, nausea, vomiting, diarrhea, fevers, chills, night sweats, hemoptysis, voice hoarseness, LE swelling.   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 Social History     Socioeconomic History    Marital status:      Spouse name: Not on file    Number of children: Not on file    Years of education: Not on file    Highest education level: Not on file   Occupational History    Not on file   Social Needs    Financial resource strain: Not on file    Food insecurity:     Worry: Not on file     Inability: Not on file    Transportation needs:     Medical: Not on file     Non-medical: Not on file   Tobacco Use    Smoking status: Never Smoker    Smokeless tobacco: Never Used   Substance and Sexual Activity    Alcohol use: No     Alcohol/week: 0.0 standard drinks    Drug use: No    Sexual activity: Not on file   Lifestyle    Physical activity:     Days per week: Not on file     Minutes per session: Not on file    Stress: Not on file   Relationships    Social connections:     Talks on phone: Not on file     Gets together: Not on file     Attends [employer] service: Not on file     Active member of club or organization: Not on file     Attends meetings of clubs or organizations: Not on file     Relationship status: Not on file    Intimate partner violence:     Fear of current or ex partner: Not on file     Emotionally abused: Not on file     Physically abused: Not on file     Forced sexual activity: Not on file   Other Topics Concern    Not on file   Social History Narrative    Not on file     Allergies: I have reviewed the allergy hx  No Known Allergies    Medications:  I have reviewed the patient's medications  Prior to Admission medications    Medication Sig Start Date End Date Taking? Authorizing Provider   predniSONE (DELTASONE) 20 mg tablet Take 60 mg by mouth daily for 5 days.  With Breakfast 8/14/19 8/19/19 Yes Aleta Addison PA   VITAMIN D2 50,000 unit capsule TAKE 1 CAPSULE BY MOUTH ONCE A WEEK 7/4/19  Yes Provider, Historical   hydroCHLOROthiazide (MICROZIDE) 12.5 mg capsule TAKE 1 CAPSULE BY MOUTH ONCE DAILY 7/16/19  Yes Provider, Historical   ketorolac (ACULAR) 0.5 % ophthalmic solution Administer 1 Drop to both eyes four (4) times daily. 5/1/19  Yes Provider, Historical   metoprolol succinate (TOPROL-XL) 50 mg XL tablet TAKE 1 TABLET BY MOUTH ONCE DAILY 6/26/19  Yes Provider, Historical   moxifloxacin (VIGAMOX) 0.5 % ophthalmic solution INSTILL 1 DROP INTO LEFT EYE 4 TIMES DAILY 5/21/19  Yes Provider, Historical   raNITIdine hcl 150 mg capsule Take 150 mg by mouth two (2) times a day. Yes Provider, Historical   albuterol (PROVENTIL HFA, VENTOLIN HFA, PROAIR HFA) 90 mcg/actuation inhaler Take 1-2 Puffs by inhalation every four (4) hours as needed for Wheezing or Shortness of Breath. 8/2/19  Yes Jaiden Olvera MD   aspirin delayed-release 81 mg tablet Take 1 Tab by mouth daily. 7/31/19  Yes Ceci Golden MD   isosorbide mononitrate ER (IMDUR) 30 mg tablet Take 1 Tab by mouth every morning. 7/31/19  Yes Ceci Golden MD   cyclobenzaprine (FLEXERIL) 10 mg tablet Take 10 mg by mouth three (3) times daily as needed for Muscle Spasm(s). Yes Provider, Historical   dilTIAZem CD (CARTIA XT) 120 mg ER capsule Take 120 mg by mouth daily. Yes Provider, Historical   atorvastatin (LIPITOR) 20 mg tablet Take 20 mg by mouth daily. Yes Provider, Historical   naproxen (NAPROSYN) 500 mg tablet Take 500 mg by mouth as needed. Yes Provider, Historical   HYDROcodone-acetaminophen (NORCO) 5-325 mg per tablet Take 1 Tab by mouth every four (4) hours as needed. Max Daily Amount: 6 Tabs. 5/17/16  Yes Naina Gupta MD   omeprazole (PRILOSEC) 20 mg capsule Take 20 mg by mouth nightly as needed. Yes Provider, Historical       Immunizations:  I have reviewed the patient's immunizations    There is no immunization history on file for this patient.     Review of Systems:  A complete review of systems was performed as stated in the HPI, all others are negative. Objective:    Physical Exam:  /76 (BP 1 Location: Left arm, BP Patient Position: Sitting)   Pulse (!) 109   Temp 98.3 °F (36.8 °C) (Oral)   Resp 18   Ht 5' 1\" (1.549 m)   Wt 64.4 kg (142 lb)   SpO2 97%   BMI 26.83 kg/m²   Vitals were personally reviewed  Gen: no acute distress, pleasant and cooperative, sitting up in chair, able to climb to exam table w/ mild difficulty  HEENT: normocephalic/atraumatic, PERRLA, EOMI, no scleral icterus, no oral lesions, fair dentition, Mallampati IV  Neck: supple, trachea midline, no JVD, no cervical and supraclavicular adenopathy  Chest: no lesions, with even rise and fall, no pectus excavatum or flail chest  CVS: regular rate rhythm, S1/S2, no murmurs/rubs/gallops  Lungs: good air entry B/L, CTABL, no wheezes/rales/rhonchi  Back: no kyphosis or scoliosis  Abdomen: soft, nontender, bowel sounds present, no hepatosplenomegaly  Ext: no pitting edema B/L, no peripheral cyanosis or clubbing  Neuro: grossly normal, AAOx3, normal strength and coordination grossly, no focal deficits  Skin: no rashes, erythema, lesions  Psych: normal memory, thought content, and processing    Labs:   I have reviewed the patient's available labs  Lab Results   Component Value Date/Time    WBC 11.9 08/14/2019 09:26 AM    HGB 14.3 08/14/2019 09:26 AM    HCT 41.7 08/14/2019 09:26 AM    PLATELET 278 84/83/2616 09:26 AM    MCV 86.2 08/14/2019 09:26 AM     Lab Results   Component Value Date/Time    Sodium 139 08/14/2019 09:26 AM    Potassium 3.3 (L) 08/14/2019 09:26 AM    Chloride 103 08/14/2019 09:26 AM    CO2 28 08/14/2019 09:26 AM    Anion gap 8 08/14/2019 09:26 AM    Glucose 128 (H) 08/14/2019 09:26 AM    BUN 10 08/14/2019 09:26 AM    Creatinine 0.85 08/14/2019 09:26 AM    BUN/Creatinine ratio 12 08/14/2019 09:26 AM    GFR est AA >60 08/14/2019 09:26 AM    GFR est non-AA >60 08/14/2019 09:26 AM Calcium 8.7 08/14/2019 09:26 AM    Bilirubin, total 0.7 08/14/2019 09:26 AM    AST (SGOT) 19 08/14/2019 09:26 AM    Alk. phosphatase 103 08/14/2019 09:26 AM    Protein, total 7.3 08/14/2019 09:26 AM    Albumin 3.6 08/14/2019 09:26 AM    Globulin 3.7 08/14/2019 09:26 AM    A-G Ratio 1.0 08/14/2019 09:26 AM    ALT (SGPT) 29 08/14/2019 09:26 AM     Labs from 8/2/2019: Elevated creatinine kinase at 869, elevated CRP at 68, positive QuantiFERON. Negative RF, ACE, ANCA panel, double-stranded DNA, SSA/SSB, JOURDAN comprehensive panel including RNP, Islas, scleroderma 70, chromatin, ribosomal, Luda 1, centromere negative      Imaging:  I have personally reviewed patient's imaging brought by patient on disc, no report available, called [facility], they are refusing to send report that was ordered by me personally. Personal review of CT scan from 8/9/2019, including inspiratory and prone imaging, show scattered groundglass opacities patchy, throughout all lung fields. PFTs: 8/2/2019: Spirometry is suggestive of a restrictive defect, no BD response. TTE:  I have reviewed the patient's TTE results  07/26/19   ECHO ADULT COMPLETE 07/26/2019 7/26/2019    Narrative · Left Ventricle: Normal cavity size and systolic function (ejection   fraction normal). Mild concentric hypertrophy. Estimated left ventricular   ejection fraction is 61 - 65%. No regional wall motion abnormality noted. Mild (grade 1) left ventricular diastolic dysfunction. · Right Ventricle: Normal right ventricular size and function. · No hemodynamically significant valvular pathology. · Pericardium: A circumferential pericardial effusion with a pericardial   fat pad was present. Signed by: Carmen Mata MD       Assessment and Plan:  66 y.o. female with:    Impression:  1. Dyspnea/shortness of breath: Etiology most likely due to ILD given extensive cardiac work-up that is mainly unremarkable.   There may be a mild component of deconditioning, but appears to be mainly driven by ILD given groundglass opacities  2. Interstitial lung disease: Based on lung windows of CT heart without contrast, scattered groundglass infiltrates seen through all lung fields. Patient works as a seamstress, exposed to [employer], otherwise no other causative exposures. No evidence of subpleural fibrosis or honeycombing. Differential diagnosis remains extensive-bronchiolitis obliterans, HP, NSIP, etc. connective tissue panel, only has elevated CK and CRP, ANCA and JOURDAN comprehensive panel negative  3. Restrictive lung disease  4. Overweight: Body mass index is 26.83 kg/m². 5.  Dysphagia: Etiology unclear  6. Elevated CK  7.  QuantiFERON positive    Plan:  -Reviewed CT scan with patient and her friend. Went over potential etiologies of dyspnea and reviewed ILD with the patient. At this point, because of ILD is unclear. I advised her that we will complete a work-up including extensive serologies and imaging before proceeding with the potential bronchoscopy or surgical lung biopsy.  -Refer patient to rheumatology for elevated CK in the setting of ILD  -Refer patient to infectious diseases given positive QuantiFERON test  -We will determine when to repeat CT imaging after patient completes referrals.  -Obtain missing lab work: HP panel  -Complete PFTs at next visit, lung volumes and DLCO  -Start Symbicort 160/4.5 MCG 2 puffs twice daily, counseled patient to rinse mouth thoroughly after each use. Samples given in clinic.  -Continue albuterol HFA 1 to 2 puffs every 4 hours as needed. Proper inhaler technique counseled  -Strongly advised patient to follow-up with GI regarding globus sensation/dysphagia and severe GERD. This may make ILD worse. -Advised patient remain active  -Reassurance provided      Follow-up and Dispositions    · Return in about 2 months (around 10/15/2019).           Orders Placed This Encounter    AMB POC PFT COMPLETE W/BRONCHODILATOR    AMB POC PFT COMPLETE W/O BRONCHODILATOR    GAS DILUT/WASHOUT LUNG VOL W/WO DISTRIB VENT&VOL    DIFFUSING CAPACITY    HYPERSENS PNEUMONITIS I    REFERRAL TO INFECTIOUS DISEASE    REFERRAL TO RHEUMATOLOGY    budesonide-formoterol (SYMBICORT) 160-4.5 mcg/actuation HFAA    budesonide-formoterol (SYMBICORT) 160-4.5 mcg/actuation HFAA       Veronica Shine MD/MPH     Pulmonary, Critical Care Medicine  [facility]

## 2019-08-15 NOTE — LETTER
8/19/19 Patient: Corin Gee  
YOB: 1955 Date of Visit: 8/15/2019 Kim Osorio MD 
[address] [facility] [address] VIA Facsimile: [phone] Rebecca Dickson MD 
[address] [address] [place] [id number] VIA In Basket Dear MD Rebecca Rowland MD, Thank you for referring Ms. Corin Gee to [address] [place] [address] for evaluation. My notes for this consultation are attached. If you have questions, please do not hesitate to call me. I look forward to following your patient along with you. Sincerely, Tj Pardo MD

## 2019-08-15 NOTE — PROGRESS NOTES
Marbella Arpan presents today for   Chief Complaint   Patient presents with    Follow-up     CT done-- Labs done        Is someone accompanying this pt? No     Is the patient using any DME equipment during OV? No     -DME Company n/a    Depression Screening:  3 most recent PHQ Screens 8/15/2019   Little interest or pleasure in doing things Not at all   Feeling down, depressed, irritable, or hopeless Not at all   Total Score PHQ 2 0       Learning Assessment:  Learning Assessment 8/15/2019   PRIMARY LEARNER Patient   PRIMARY LANGUAGE ENGLISH   LEARNER PREFERENCE PRIMARY LISTENING   ANSWERED BY patient   RELATIONSHIP SELF       Abuse Screening:  Abuse Screening Questionnaire 8/15/2019   Do you ever feel afraid of your partner? N   Are you in a relationship with someone who physically or mentally threatens you? N   Is it safe for you to go home? Y       Fall Risk  No flowsheet data found. Coordination of Care:  1. Have you been to the ER, urgent care clinic since your last visit? Hospitalized since your last visit? Yes; Name: [facility]    2. Have you seen or consulted any other health care providers outside of the [address] [place] [address] since your last visit? Include any pap smears or colon screening.  Dr Eduard Jose PCP

## 2019-08-16 DIAGNOSIS — R06.09 DYSPNEA ON EXERTION: ICD-10-CM

## 2019-08-16 DIAGNOSIS — R06.02 SHORTNESS OF BREATH: ICD-10-CM

## 2019-08-16 DIAGNOSIS — J84.9 ILD (INTERSTITIAL LUNG DISEASE) (HCC): ICD-10-CM

## 2019-08-16 DIAGNOSIS — R91.8 PULMONARY INFILTRATES: ICD-10-CM

## 2019-08-20 ENCOUNTER — TELEPHONE (OUTPATIENT)
Dept: PULMONOLOGY | Age: 64
End: 2019-08-20

## 2019-08-20 NOTE — TELEPHONE ENCOUNTER
Pt already has call in with PCP.  Advised yes to talk with PCP to see if he can give something for anxiety

## 2019-08-20 NOTE — TELEPHONE ENCOUNTER
PT [facility]([phone]). PT HAVING SOB DUE TO ANXIETY BECAUSE HER MOTHER PASSED AWAY. SHE WANTS TO KNOW IF DR VICTOR CAN CALL IN SOMETHING FOR THE ANXIETY. PLEASE CHECK AND CALL BACK.

## 2019-08-21 DIAGNOSIS — J84.9 ILD (INTERSTITIAL LUNG DISEASE) (HCC): ICD-10-CM

## 2019-08-21 DIAGNOSIS — R06.02 SOB (SHORTNESS OF BREATH): ICD-10-CM

## 2019-08-21 DIAGNOSIS — J98.4 RESTRICTIVE LUNG DISEASE: ICD-10-CM

## 2019-08-21 DIAGNOSIS — R91.8 PULMONARY INFILTRATES: ICD-10-CM

## 2019-08-21 DIAGNOSIS — R06.09 DYSPNEA ON EXERTION: ICD-10-CM

## 2019-08-23 LAB
A FUMIGATUS1 AB SER QL ID: NEGATIVE
A PULLULANS AB SER QL: NEGATIVE
LACEYELLA SACCHARI AB SER QL: NEGATIVE
PIGEON SERUM AB QL ID: NEGATIVE
S RECTIVIRGULA AB SER QL ID: NEGATIVE
T VULGARIS AB SER QL ID: NEGATIVE

## 2019-09-04 ENCOUNTER — OFFICE VISIT (OUTPATIENT)
Dept: CARDIOLOGY CLINIC | Age: 64
End: 2019-09-04

## 2019-09-04 ENCOUNTER — TELEPHONE (OUTPATIENT)
Dept: PULMONOLOGY | Age: 64
End: 2019-09-04

## 2019-09-04 VITALS
DIASTOLIC BLOOD PRESSURE: 87 MMHG | SYSTOLIC BLOOD PRESSURE: 126 MMHG | BODY MASS INDEX: 27 KG/M2 | WEIGHT: 143 LBS | HEART RATE: 83 BPM | HEIGHT: 61 IN

## 2019-09-04 DIAGNOSIS — I25.10 CORONARY ARTERY CALCIFICATION: Primary | ICD-10-CM

## 2019-09-04 DIAGNOSIS — I10 ESSENTIAL HYPERTENSION: ICD-10-CM

## 2019-09-04 DIAGNOSIS — I25.84 CORONARY ARTERY CALCIFICATION: Primary | ICD-10-CM

## 2019-09-04 DIAGNOSIS — J84.9 INTERSTITIAL LUNG DISEASE (HCC): ICD-10-CM

## 2019-09-04 DIAGNOSIS — R06.02 SOB (SHORTNESS OF BREATH): ICD-10-CM

## 2019-09-04 RX ORDER — ATORVASTATIN CALCIUM 40 MG/1
40 TABLET, FILM COATED ORAL DAILY
Qty: 90 TAB | Refills: 3 | Status: SHIPPED | OUTPATIENT
Start: 2019-09-04

## 2019-09-04 NOTE — TELEPHONE ENCOUNTER
Called, was advised pt not having sob anymore and she was having a panic situation.  She also saw her cardiologist this am. Pt reports to be sxs free at this time

## 2019-09-04 NOTE — PROGRESS NOTES
HISTORY OF PRESENT ILLNESS  Ryland Castanon is a 66 y.o. female. Chest Pain (Angina)    The history is provided by the patient. This is a recurrent problem. The problem has not changed since onset. The problem occurs every several days. The pain is associated with exertion and rest. The pain is present in the substernal region and left side. The pain is mild. The quality of the pain is described as pressure-like and dull. The pain does not radiate. Associated symptoms include shortness of breath. Pertinent negatives include no abdominal pain, no claudication, no cough, no dizziness, no fever, no headaches, no hemoptysis, no nausea, no orthopnea, no palpitations, no PND, no sputum production, no vomiting and no weakness. Shortness of Breath   The history is provided by the patient. This is a recurrent problem. The problem occurs intermittently. The problem has not changed since onset. Associated symptoms include chest pain. Pertinent negatives include no fever, no headaches, no cough, no sputum production, no hemoptysis, no wheezing, no PND, no orthopnea, no vomiting, no abdominal pain, no rash, no leg swelling and no claudication. Cholesterol Problem   The history is provided by the patient. This is a chronic problem. The problem occurs constantly. The problem has not changed since onset. Associated symptoms include chest pain and shortness of breath. Pertinent negatives include no abdominal pain and no headaches. Review of Systems   Constitutional: Negative for chills and fever. HENT: Negative for nosebleeds. Eyes: Negative for blurred vision and double vision. Respiratory: Positive for shortness of breath. Negative for cough, hemoptysis, sputum production and wheezing. Cardiovascular: Positive for chest pain. Negative for palpitations, orthopnea, claudication, leg swelling and PND. Gastrointestinal: Positive for heartburn. Negative for abdominal pain, nausea and vomiting. Musculoskeletal: Negative for myalgias. Skin: Negative for rash. Neurological: Negative for dizziness, weakness and headaches. Endo/Heme/Allergies: Does not bruise/bleed easily. Family History   Problem Relation Age of Onset    Heart Disease Mother     Arthritis-osteo Mother     Arthritis-osteo Father     Heart Surgery Neg Hx     Heart Attack Neg Hx        Past Medical History:   Diagnosis Date    Anxiety     Arthritis     GERD (gastroesophageal reflux disease)     Hypertension        Past Surgical History:   Procedure Laterality Date    HX CHOLECYSTECTOMY  2015    HX HYSTERECTOMY      HX OTHER SURGICAL      parotid    HX OTHER SURGICAL      Salivary gland surgery        Social History     Tobacco Use    Smoking status: Never Smoker    Smokeless tobacco: Never Used   Substance Use Topics    Alcohol use: No     Alcohol/week: 0.0 standard drinks       No Known Allergies        Visit Vitals  /87   Pulse 83   Ht 5' 1\" (1.549 m)   Wt 64.9 kg (143 lb)   BMI 27.02 kg/m²       Physical Exam   Constitutional: She is oriented to person, place, and time. She appears well-developed and well-nourished. HENT:   Head: Normocephalic and atraumatic. Eyes: Conjunctivae are normal.   Neck: Neck supple. No JVD present. No tracheal deviation present. No thyromegaly present. Cardiovascular: Normal rate, regular rhythm, normal heart sounds and intact distal pulses. PMI is not displaced. Exam reveals no gallop, no S3 and no decreased pulses. No murmur heard. Pulmonary/Chest: No respiratory distress. She has no wheezes. She has no rales. She exhibits no tenderness. Abdominal: Soft. There is no tenderness. Musculoskeletal: She exhibits no edema. Neurological: She is alert and oriented to person, place, and time. Skin: Skin is warm. Psychiatric: She has a normal mood and affect. Nursing note and vitals reviewed. Ms. Jumana Marx has a reminder for a \"due or due soon\" health maintenance. I have asked that she contact her primary care provider for follow-up on this health maintenance. I have personally reviewed patients ekg done at other facility. 2016  Sinus rhythm with occasional premature ventricular complexes   Inferior infarct , age undetermined   Cannot rule out Anterior infarct , age undetermined   Abnormal ECG   No previous ECGs available   NUCLEAR IMAGIN2016     Findings:    1. Stress images reveal normal Myoview distrubution in all the LV segments in short axis, vertical and horizontal long axis views.    2. Resting images have a normal uptake.    3. Gated images reveal normal wall motion and the ejection fraction is calculated to be 79%.    Conclusion:    1. Normal perfusion scan.    2. Normal wall motion and ejection fraction.    3. Low risk scan. SUMMARY:echo:2016  Procedure information: This was a technically difficult study. Left ventricle: Systolic function was normal. Ejection fraction was  estimated to be 60 %. No obvious wall motion abnormalities identified in  the views obtained. There was mild concentric hypertrophy. Doppler  parameters were consistent with abnormal left ventricular relaxation  (grade 1 diastolic dysfunction). Pericardium: A circumferential pericardial fat pad was present . IMPRESSION 2019  IMPRESSION:     1. Air trapping in the lung bases suggesting presence of interstitial lung  disease. Limited evaluation. Follow-up with CT of the thorax is recommended     The final Radiology portion of this exam was provided by Dr. Drena Pallas on  2019 10:48 AM.     The final Cardiology report will follow. END RADIOLOGY PORTION OF REPORT     CARDIOLOGY REPORT:   2019     The patient underwent a limited noncontrast CT scan of her chest with cardiac  gating to evaluate for coronary arterial calcification. Using the Agatston  method the coronary calcium score was calculated.  There is a large amount of  coronary calcification present in all 3 major coronary vessels. Coronary calcium  score calculated 393. Procedure Conclusion     Nuclear Stress Test 7/2019    Negative myocardial perfusion imaging. Myocardial perfusion imaging supports a low risk stress test.   There is a prior study available for comparison. As compared to the previous study, there are no significant changes. Interpretation Summary 7/2019       · Left Ventricle: Normal cavity size and systolic function (ejection fraction normal). Mild concentric hypertrophy. Estimated left ventricular ejection fraction is 61 - 65%. No regional wall motion abnormality noted. Mild (grade 1) left ventricular diastolic dysfunction. · Right Ventricle: Normal right ventricular size and function. · No hemodynamically significant valvular pathology. · Pericardium: A circumferential pericardial effusion with a pericardial fat pad was present. Impression: Pulmonary evaluation-8/2019  1. Dyspnea/shortness of breath: Etiology most likely due to ILD given extensive cardiac work-up that is mainly unremarkable. There may be a mild component of deconditioning, but appears to be mainly driven by ILD given groundglass opacities  2. Interstitial lung disease: Based on lung windows of CT heart without contrast, scattered groundglass infiltrates seen through all lung fields. 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 No evidence of subpleural fibrosis or honeycombing. Differential diagnosis remains extensive-bronchiolitis obliterans, HP, NSIP, etc.   Assessment         ICD-10-CM ICD-9-CM    1. Coronary artery calcification I25.10 414.00     I25.84 414.4     Date negative nuclear stress test in etiology for shortness of breath mainly related to pulmonary pathology will continue to monitor   2. SOB (shortness of breath) R06.02 786.05     Likely related to interstitial lung disease. Pulmonary evaluation noted. Work-up in progress   3.  Essential hypertension I10 401.9     Stable on treatment   4. Interstitial lung disease (HCC) J84.9 515     Short of breath on exertion. Etiology unclear. Work-up with pulmonary and immunology     6/2019  Cardiac symptoms stable. Blood pressure elevated started Toprol 50 mg a day. Follow-up lab with PCP    7/2019 - EKG - ST with right axis deviation - She stopped taking Toprol stating it made he feel bad. Continues to c/o SOB with chest pain on exertion. She also c/o epigastric burning and frequent belching. Will obtain NST, Echo and calcium score to r/o ischemia. Referral to GI for reflux symptoms. Fasting lipids ordered. 7/31/2019  CTA showing moderate calcium score. Also suggested interstitial lung disease. Negative stress test.  Remains short of breath with occasional atypical chest pains. Add Imdur and aspirin. Continue diltiazem and statin. Referred to pulmonary. If no significant pulmonary problem consider invasive cardiac work-up to assess for possible angina equivalent related shortness of breath  9/2019  Short of breath on and off. Pulmonary fibrosis as etiology. Being worked up by Dr. Eli Rojas. LDL more than 100 I have increase Lipitor  Medications Discontinued During This Encounter   Medication Reason    budesonide-formoterol (SYMBICORT) 160-4.5 mcg/actuation HFAA Duplicate Order       No orders of the defined types were placed in this encounter. Follow-up and Dispositions    · Return in about 6 months (around 3/4/2020). Follow-up and Dispositions    · Return in about 6 months (around 3/4/2020).          Ashley Gustafson MD

## 2019-09-04 NOTE — PROGRESS NOTES
1. Have you been to the ER, urgent care clinic since your last visit? Hospitalized since your last visit? Yes When: 08/2019 Where: [facility] Reason for visit: Shortness of breath    2. Have you seen or consulted any other health care providers outside of the [address] [place] [address] since your last visit? Include any pap smears or colon screening.  Yes Where: Pulmonary/PCP Reason for visit: Shortness of breath/Routine Visit

## 2019-09-09 ENCOUNTER — ANESTHESIA EVENT (OUTPATIENT)
Dept: ENDOSCOPY | Age: 64
End: 2019-09-09
Payer: COMMERCIAL

## 2019-09-09 ENCOUNTER — DOCUMENTATION ONLY (OUTPATIENT)
Dept: PULMONOLOGY | Age: 64
End: 2019-09-09

## 2019-09-10 ENCOUNTER — HOSPITAL ENCOUNTER (OUTPATIENT)
Age: 64
Setting detail: OUTPATIENT SURGERY
Discharge: HOME | End: 2019-09-10
Attending: INTERNAL MEDICINE | Admitting: INTERNAL MEDICINE
Payer: COMMERCIAL

## 2019-09-10 ENCOUNTER — APPOINTMENT (OUTPATIENT)
Dept: GENERAL RADIOLOGY | Age: 64
End: 2019-09-10
Attending: INTERNAL MEDICINE
Payer: COMMERCIAL

## 2019-09-10 ENCOUNTER — ANESTHESIA (OUTPATIENT)
Dept: ENDOSCOPY | Age: 64
End: 2019-09-10
Payer: COMMERCIAL

## 2019-09-10 VITALS
BODY MASS INDEX: 26.43 KG/M2 | HEIGHT: 61 IN | RESPIRATION RATE: 126 BRPM | TEMPERATURE: 98 F | DIASTOLIC BLOOD PRESSURE: 74 MMHG | WEIGHT: 140 LBS | SYSTOLIC BLOOD PRESSURE: 122 MMHG | HEART RATE: 81 BPM | OXYGEN SATURATION: 98 %

## 2019-09-10 LAB
BRONCH. LAVAGE DIFF.,BR: NORMAL
BRONCH. LAVAGE DIFF.,BR: NORMAL
BUN BLD-MCNC: 17 MG/DL (ref 7–18)
CHLORIDE BLD-SCNC: 104 MMOL/L (ref 100–108)
EOSINOPHIL NFR BRONCH MANUAL: 0 %
EOSINOPHIL NFR BRONCH MANUAL: 3 %
GLUCOSE BLD STRIP.AUTO-MCNC: 104 MG/DL (ref 74–106)
HCT VFR BLD CALC: 40 % (ref 36–49)
HGB BLD-MCNC: 13.6 G/DL (ref 12–16)
LYMPHOCYTES NFR BRONCH MANUAL: 43 %
LYMPHOCYTES NFR BRONCH MANUAL: 58 %
MACROPHAGES NFR BRONCH MANUAL: 13 %
MACROPHAGES NFR BRONCH MANUAL: 16 %
NEUTROPHILS NFR BRONCH MANUAL: 26 %
NEUTROPHILS NFR BRONCH MANUAL: 41 %
POTASSIUM BLD-SCNC: 3.8 MMOL/L (ref 3.5–5.5)
SODIUM BLD-SCNC: 140 MMOL/L (ref 136–145)

## 2019-09-10 PROCEDURE — 77030003454 HC NDL BIOP BRONCH BSC -B: Performed by: INTERNAL MEDICINE

## 2019-09-10 PROCEDURE — 74011250636 HC RX REV CODE- 250/636: Performed by: NURSE ANESTHETIST, CERTIFIED REGISTERED

## 2019-09-10 PROCEDURE — 74011250637 HC RX REV CODE- 250/637: Performed by: NURSE ANESTHETIST, CERTIFIED REGISTERED

## 2019-09-10 PROCEDURE — 76060000032 HC ANESTHESIA 0.5 TO 1 HR: Performed by: INTERNAL MEDICINE

## 2019-09-10 PROCEDURE — 87101 SKIN FUNGI CULTURE: CPT

## 2019-09-10 PROCEDURE — 87077 CULTURE AEROBIC IDENTIFY: CPT

## 2019-09-10 PROCEDURE — 77030003400 HC NDL ASPIR BIOP CNMD -B: Performed by: INTERNAL MEDICINE

## 2019-09-10 PROCEDURE — 74011000258 HC RX REV CODE- 258: Performed by: INTERNAL MEDICINE

## 2019-09-10 PROCEDURE — 77030022556 HC FCPS BIOP TIS ENDOSC BSC -B: Performed by: INTERNAL MEDICINE

## 2019-09-10 PROCEDURE — 89051 BODY FLUID CELL COUNT: CPT

## 2019-09-10 PROCEDURE — 84295 ASSAY OF SERUM SODIUM: CPT

## 2019-09-10 PROCEDURE — 74011000250 HC RX REV CODE- 250

## 2019-09-10 PROCEDURE — 74011000250 HC RX REV CODE- 250: Performed by: INTERNAL MEDICINE

## 2019-09-10 PROCEDURE — 88112 CYTOPATH CELL ENHANCE TECH: CPT

## 2019-09-10 PROCEDURE — 88305 TISSUE EXAM BY PATHOLOGIST: CPT

## 2019-09-10 PROCEDURE — 87116 MYCOBACTERIA CULTURE: CPT

## 2019-09-10 PROCEDURE — 87070 CULTURE OTHR SPECIMN AEROBIC: CPT

## 2019-09-10 PROCEDURE — 77030012699 HC VLV SUC CNTRL OCOA -A: Performed by: INTERNAL MEDICINE

## 2019-09-10 PROCEDURE — 77030018823 HC SLV COMPR VENO -B: Performed by: INTERNAL MEDICINE

## 2019-09-10 PROCEDURE — 76040000007: Performed by: INTERNAL MEDICINE

## 2019-09-10 PROCEDURE — 74011250636 HC RX REV CODE- 250/636: Performed by: INTERNAL MEDICINE

## 2019-09-10 PROCEDURE — 74011250636 HC RX REV CODE- 250/636

## 2019-09-10 PROCEDURE — 77030013140 HC MSK NEB VYRM -A: Performed by: INTERNAL MEDICINE

## 2019-09-10 PROCEDURE — 71045 X-RAY EXAM CHEST 1 VIEW: CPT

## 2019-09-10 PROCEDURE — 88312 SPECIAL STAINS GROUP 1: CPT

## 2019-09-10 PROCEDURE — 87185 SC STD ENZYME DETCJ PER NZM: CPT

## 2019-09-10 RX ORDER — SODIUM CHLORIDE 0.9 % (FLUSH) 0.9 %
5-40 SYRINGE (ML) INJECTION EVERY 8 HOURS
Status: DISCONTINUED | OUTPATIENT
Start: 2019-09-10 | End: 2019-09-10 | Stop reason: HOSPADM

## 2019-09-10 RX ORDER — EPHEDRINE SULFATE 50 MG/ML
INJECTION, SOLUTION INTRAVENOUS AS NEEDED
Status: DISCONTINUED | OUTPATIENT
Start: 2019-09-10 | End: 2019-09-10 | Stop reason: HOSPADM

## 2019-09-10 RX ORDER — SODIUM CHLORIDE, SODIUM LACTATE, POTASSIUM CHLORIDE, CALCIUM CHLORIDE 600; 310; 30; 20 MG/100ML; MG/100ML; MG/100ML; MG/100ML
75 INJECTION, SOLUTION INTRAVENOUS CONTINUOUS
Status: DISCONTINUED | OUTPATIENT
Start: 2019-09-10 | End: 2019-09-10 | Stop reason: HOSPADM

## 2019-09-10 RX ORDER — IPRATROPIUM BROMIDE AND ALBUTEROL SULFATE 2.5; .5 MG/3ML; MG/3ML
3 SOLUTION RESPIRATORY (INHALATION)
Status: COMPLETED | OUTPATIENT
Start: 2019-09-10 | End: 2019-09-10

## 2019-09-10 RX ORDER — SODIUM CHLORIDE 0.9 % (FLUSH) 0.9 %
5-40 SYRINGE (ML) INJECTION AS NEEDED
Status: DISCONTINUED | OUTPATIENT
Start: 2019-09-10 | End: 2019-09-10 | Stop reason: HOSPADM

## 2019-09-10 RX ORDER — LIDOCAINE HYDROCHLORIDE 20 MG/ML
INJECTION, SOLUTION INFILTRATION; PERINEURAL AS NEEDED
Status: DISCONTINUED | OUTPATIENT
Start: 2019-09-10 | End: 2019-09-10 | Stop reason: HOSPADM

## 2019-09-10 RX ORDER — INSULIN LISPRO 100 [IU]/ML
INJECTION, SOLUTION INTRAVENOUS; SUBCUTANEOUS ONCE
Status: DISCONTINUED | OUTPATIENT
Start: 2019-09-10 | End: 2019-09-10 | Stop reason: HOSPADM

## 2019-09-10 RX ORDER — SODIUM CHLORIDE, SODIUM LACTATE, POTASSIUM CHLORIDE, CALCIUM CHLORIDE 600; 310; 30; 20 MG/100ML; MG/100ML; MG/100ML; MG/100ML
25 INJECTION, SOLUTION INTRAVENOUS CONTINUOUS
Status: DISCONTINUED | OUTPATIENT
Start: 2019-09-10 | End: 2019-09-10 | Stop reason: HOSPADM

## 2019-09-10 RX ORDER — LORAZEPAM 2 MG/ML
INJECTION INTRAMUSCULAR
Status: COMPLETED
Start: 2019-09-10 | End: 2019-09-10

## 2019-09-10 RX ORDER — DEXAMETHASONE SODIUM PHOSPHATE 4 MG/ML
10 INJECTION, SOLUTION INTRA-ARTICULAR; INTRALESIONAL; INTRAMUSCULAR; INTRAVENOUS; SOFT TISSUE ONCE
Status: DISCONTINUED | OUTPATIENT
Start: 2019-09-10 | End: 2019-09-10

## 2019-09-10 RX ORDER — LORAZEPAM 2 MG/ML
0.5 INJECTION INTRAMUSCULAR AS NEEDED
Status: DISCONTINUED | OUTPATIENT
Start: 2019-09-10 | End: 2019-09-10 | Stop reason: HOSPADM

## 2019-09-10 RX ORDER — FAMOTIDINE 20 MG/1
20 TABLET, FILM COATED ORAL ONCE
Status: COMPLETED | OUTPATIENT
Start: 2019-09-10 | End: 2019-09-10

## 2019-09-10 RX ORDER — IPRATROPIUM BROMIDE AND ALBUTEROL SULFATE 2.5; .5 MG/3ML; MG/3ML
SOLUTION RESPIRATORY (INHALATION)
Status: COMPLETED
Start: 2019-09-10 | End: 2019-09-10

## 2019-09-10 RX ORDER — LIDOCAINE HYDROCHLORIDE 10 MG/ML
0.1 INJECTION, SOLUTION EPIDURAL; INFILTRATION; INTRACAUDAL; PERINEURAL AS NEEDED
Status: DISCONTINUED | OUTPATIENT
Start: 2019-09-10 | End: 2019-09-10 | Stop reason: HOSPADM

## 2019-09-10 RX ORDER — PROPOFOL 10 MG/ML
INJECTION, EMULSION INTRAVENOUS
Status: DISCONTINUED | OUTPATIENT
Start: 2019-09-10 | End: 2019-09-10 | Stop reason: HOSPADM

## 2019-09-10 RX ORDER — PROPOFOL 10 MG/ML
INJECTION, EMULSION INTRAVENOUS AS NEEDED
Status: DISCONTINUED | OUTPATIENT
Start: 2019-09-10 | End: 2019-09-10 | Stop reason: HOSPADM

## 2019-09-10 RX ORDER — LIDOCAINE HYDROCHLORIDE 20 MG/ML
INJECTION, SOLUTION EPIDURAL; INFILTRATION; INTRACAUDAL; PERINEURAL AS NEEDED
Status: DISCONTINUED | OUTPATIENT
Start: 2019-09-10 | End: 2019-09-10 | Stop reason: HOSPADM

## 2019-09-10 RX ADMIN — PROPOFOL 40 MG: 10 INJECTION, EMULSION INTRAVENOUS at 13:31

## 2019-09-10 RX ADMIN — IPRATROPIUM BROMIDE AND ALBUTEROL SULFATE 3 ML: 2.5; .5 SOLUTION RESPIRATORY (INHALATION) at 14:20

## 2019-09-10 RX ADMIN — IPRATROPIUM BROMIDE AND ALBUTEROL SULFATE 3 ML: .5; 3 SOLUTION RESPIRATORY (INHALATION) at 14:20

## 2019-09-10 RX ADMIN — PROPOFOL 30 MG: 10 INJECTION, EMULSION INTRAVENOUS at 13:25

## 2019-09-10 RX ADMIN — DEXAMETHASONE SODIUM PHOSPHATE 10 MG: 4 INJECTION, SOLUTION INTRAMUSCULAR; INTRAVENOUS at 14:25

## 2019-09-10 RX ADMIN — LIDOCAINE HYDROCHLORIDE 40 MG: 20 INJECTION, SOLUTION EPIDURAL; INFILTRATION; INTRACAUDAL; PERINEURAL at 13:32

## 2019-09-10 RX ADMIN — EPHEDRINE SULFATE 5 MG: 50 INJECTION, SOLUTION INTRAVENOUS at 13:53

## 2019-09-10 RX ADMIN — PROPOFOL 120 MCG/KG/MIN: 10 INJECTION, EMULSION INTRAVENOUS at 13:24

## 2019-09-10 RX ADMIN — FAMOTIDINE 20 MG: 20 TABLET ORAL at 12:26

## 2019-09-10 RX ADMIN — LORAZEPAM 0.5 MG: 2 INJECTION INTRAMUSCULAR; INTRAVENOUS at 14:52

## 2019-09-10 RX ADMIN — LORAZEPAM 0.5 MG: 2 INJECTION INTRAMUSCULAR at 14:52

## 2019-09-10 RX ADMIN — SODIUM CHLORIDE, SODIUM LACTATE, POTASSIUM CHLORIDE, AND CALCIUM CHLORIDE 75 ML/HR: 600; 310; 30; 20 INJECTION, SOLUTION INTRAVENOUS at 12:55

## 2019-09-10 NOTE — H&P
H&P Update:  Marbella Antony was seen and examined. History and physical has been reviewed. The patient has been examined. There have been no significant clinical changes since the completion of the originally dated History and Physical from 8/15/19. Pt is appropriate for bronchoscopy with BAL.       Isra Ruff MD/MPH     Pulmonary, Critical Care Medicine  [facility]

## 2019-09-10 NOTE — ANESTHESIA PREPROCEDURE EVALUATION
Relevant Problems   No relevant active problems       Anesthetic History   No history of anesthetic complications            Review of Systems / Medical History  Patient summary reviewed, nursing notes reviewed and pertinent labs reviewed    Pulmonary  Within defined limits                 Neuro/Psych   Within defined limits           Cardiovascular    Hypertension: well controlled              Exercise tolerance: >4 METS     GI/Hepatic/Renal     GERD: well controlled           Endo/Other        Arthritis     Other Findings              Physical Exam    Airway  Mallampati: II  TM Distance: < 4 cm  Neck ROM: normal range of motion, short neck   Mouth opening: Diminished (comment)     Cardiovascular  Regular rate and rhythm,  S1 and S2 normal,  no murmur, click, rub, or gallop  Rhythm: regular  Rate: normal         Dental  No notable dental hx       Pulmonary  Breath sounds clear to auscultation               Abdominal  Abdominal exam normal       Other Findings            Anesthetic Plan    ASA: 3  Anesthesia type: MAC          Induction: Intravenous  Anesthetic plan and risks discussed with: Patient

## 2019-09-10 NOTE — PROCEDURES
[facility]  Pulmonary, Critical Care, and Sleep Medicine    Name: Juan Gerardo MRN: [id number]   : 1955 Hospital: [address] [place] [address]   Date: 9/10/2019        Bronchoscopy Report    Procedure: Diagnostic bronchoscopy. Indication: Abnormal chest imaging    Consent/Treatment: Informed consent was obtained from the  patient after risks, benefits and alternatives were explained. Timeout verified the correct patient and correct procedure. Anesthesia:   Topical sedation to nares, mouth, and tracheobronchial tree with lidocaine  Moderate conscious sedation performed by anesthesiology    Procedure Details:   -- The bronchoscope was introduced through the right nare. -- The vocal cords were found to be normal.  -- The trachea and molly were completely inspected and were found to be normal.  -- The right-sided endobronchial anatomy was completely inspected and was found to be normal.  -- The left-sided endobronchial anatomy was completely inspected and was found to be normal.   -- The lower airways were found to have bronchomalacia. -- There was not a significant amount of mucus. Specimens: The bronchoscope was wedged in the RML and Lingula and bronchoalveolar lavage was performed; material was sent for  microbiology, cytology, AFB smear and culture and fungal culture  Transbronchial biopsy from RML was performed and sent for  microbiology, cytology, AFB smear and culture, fungal culture and surgical pathology    Rapid On-Site Evaluation: Not assessed. Complications: The patient became apneic and desaturated during the procedure requiring withdrawal of the bronchoscope while anesthesia perform bag-mask ventilation until the patient started spontaneously breathing again. The patient recovered quickly. A nasal trumpet was placed. The bronchoscopy was re-introduced to obtain transbronchial biopsies.       Estimated Blood Loss: Minimal    Richi Wallace MD [facility] Fellow   September 10, 2019  2:03 PM        I was present for and supervised the entire procedure. See fellow note for details.     Gloria Olsen MD/MPH     Pulmonary, Critical Care Medicine  [facility]

## 2019-09-10 NOTE — ANESTHESIA POSTPROCEDURE EVALUATION
Procedure(s):  BRONCHOSCOPY FLEXIBLE with BAL and bx's. MAC    Anesthesia Post Evaluation      Multimodal analgesia: multimodal analgesia not used between 6 hours prior to anesthesia start to PACU discharge  Patient location during evaluation: PACU  Patient participation: complete - patient participated  Level of consciousness: awake and alert  Pain score: 3  Airway patency: patent  Anesthetic complications: no  Cardiovascular status: acceptable  Respiratory status: acceptable  Hydration status: acceptable  Post anesthesia nausea and vomiting:  none      Vitals Value Taken Time   /75 9/10/2019  2:33 PM   Temp 36.7 °C (98 °F) 9/10/2019  2:04 PM   Pulse 78 9/10/2019  2:44 PM   Resp 39 9/10/2019  2:44 PM   SpO2 100 % 9/10/2019  2:44 PM   Vitals shown include unvalidated device data.

## 2019-09-10 NOTE — DISCHARGE INSTRUCTIONS
Patient Education        Bronchoscopy: What to Expect at Home  Your Recovery    Bronchoscopy lets your doctor look at your airway through a tube called a bronchoscope. Afterward, you may feel tired for 1 or 2 days. Your mouth may feel very dry for several hours after the procedure. You may also have a sore throat and a hoarse voice for a few days. Sucking on throat lozenges or gargling with warm salt water may help soothe your sore throat. If a sample of tissue (biopsy) was taken, you may spit up a small amount of blood or have bloody saliva. This is normal.  This care sheet gives you a general idea about how long it will take for you to recover. But each person recovers at a different pace. Follow the steps below to get better as quickly as possible. How can you care for yourself at home? Activity    · Do not eat anything for 2 hours after the procedure.     · Rest when you feel tired. Getting enough sleep will help you recover.     · Avoid strenuous activities, such as bicycle riding, jogging, weight lifting, or aerobic exercise, until your doctor says it is okay.     · Ask your doctor when you can drive again. Diet    · You can eat your normal diet. If your stomach is upset, try bland, low-fat foods like plain rice, broiled chicken, toast, and yogurt.     · If it is painful to swallow, start out with cold drinks, flavored ice pops, and ice cream. Next, try soft foods like pudding, yogurt, canned or cooked fruit, scrambled eggs, and mashed potatoes. Avoid eating hard or scratchy foods like chips or raw vegetables. Avoid orange or tomato juice and other acidic foods that can sting the throat.     · Drink plenty of fluids to avoid becoming dehydrated (unless your doctor tells you not to). Medicines    · Take pain medicines exactly as directed. ? If the doctor gave you a prescription medicine for pain, take it as prescribed.   ? If you are not taking a prescription pain medicine, ask your doctor if you can take an over-the-counter medicine.     · If you think your pain medicine is making you sick to your stomach:  ? Take your medicine after meals (unless your doctor has told you not to). ? Ask your doctor for a different pain medicine.     · If your doctor prescribed antibiotics, take them as directed. Do not stop taking them just because you feel better. You need to take the full course of antibiotics. Follow-up care is a key part of your treatment and safety. Be sure to make and go to all appointments, and call your doctor if you are having problems. It's also a good idea to know your test results and keep a list of the medicines you take. When should you call for help? Call 911 anytime you think you may need emergency care. For example, call if:    · You passed out (lost consciousness).     · You have sudden chest pain and shortness of breath.     · You cough up large amounts of bright red blood.     · You have severe pain in your chest.     · You have severe trouble breathing.    Call your doctor now or seek immediate medical care if:    · You cough up more than a few tablespoons of blood.     · You have pain that does not get better after you take pain medicine.     · You have a fever over 100°F.     · You still sound hoarse after a few days.     · You have bubbles under the skin around the collarbone. These may crackle and pop when you press on them.    Watch closely for changes in your health, and be sure to contact your doctor if you have any problems. Where can you learn more? Go to http://jenae-angelo.info/. Enter Q177 in the search box to learn more about \"Bronchoscopy: What to Expect at Home. \"  Current as of: September 5, 2018  Content Version: 12.1  © 0365-7877 [employer]. Care instructions adapted under license by [employer] (which disclaims liability or warranty for this information).  If you have questions about a medical condition or this instruction, always ask your healthcare professional. [place] [address] any warranty or liability for your use of this information. DISCHARGE SUMMARY from Nurse    PATIENT INSTRUCTIONS:    After general anesthesia or intravenous sedation, for 24 hours or while taking prescription Narcotics:  · Limit your activities  · Do not drive and operate hazardous machinery  · Do not make important personal or business decisions  · Do  not drink alcoholic beverages  · If you have not urinated within 8 hours after discharge, please contact your surgeon on call. Report the following to your surgeon:  · Excessive pain, swelling, redness or odor of or around the surgical area  · Temperature over 100.5  · Nausea and vomiting lasting longer than 4 hours or if unable to take medications  · Any signs of decreased circulation or nerve impairment to extremity: change in color, persistent  numbness, tingling, coldness or increase pain  · Any questions    *  Please give a list of your current medications to your Primary Care Provider. *  Please update this list whenever your medications are discontinued, doses are      changed, or new medications (including over-the-counter products) are added. *  Please carry medication information at all times in case of emergency situations. These are general instructions for a healthy lifestyle:    No smoking/ No tobacco products/ Avoid exposure to second hand smoke  Surgeon General's Warning:  Quitting smoking now greatly reduces serious risk to your health.     Obesity, smoking, and sedentary lifestyle greatly increases your risk for illness    A healthy diet, regular physical exercise & weight monitoring are important for maintaining a healthy lifestyle    You may be retaining fluid if you have a history of heart failure or if you experience any of the following symptoms:  Weight gain of 3 pounds or more overnight or 5 pounds in a week, increased swelling in our hands or feet or shortness of breath while lying flat in bed. Please call your doctor as soon as you notice any of these symptoms; do not wait until your next office visit. The discharge information has been reviewed with the {PATIENT PARENT GUARDIAN:[id number]}. The {PATIENT PARENT GUARDIAN:63386} verbalized understanding. Discharge medications reviewed with the {Dishcarge meds reviewed [facility]:88707} and appropriate educational materials and side effects teaching were provided.   ___________________________________________________________________________________________________________________________________

## 2019-09-11 DIAGNOSIS — J38.3 VOCAL CORD DYSFUNCTION: ICD-10-CM

## 2019-09-11 DIAGNOSIS — R06.83 SNORING: ICD-10-CM

## 2019-09-11 DIAGNOSIS — G47.19 EXCESSIVE DAYTIME SLEEPINESS: ICD-10-CM

## 2019-09-11 DIAGNOSIS — G47.33 OSA (OBSTRUCTIVE SLEEP APNEA): Primary | ICD-10-CM

## 2019-09-12 LAB
BACTERIA SPEC CULT: NORMAL
BACTERIA SPEC CULT: NORMAL
GRAM STN SPEC: NORMAL
SERVICE CMNT-IMP: NORMAL
SERVICE CMNT-IMP: NORMAL

## 2019-09-13 LAB
BACTERIA SPEC CULT: ABNORMAL
BACTERIA SPEC CULT: ABNORMAL
GRAM STN SPEC: ABNORMAL
GRAM STN SPEC: ABNORMAL
SERVICE CMNT-IMP: ABNORMAL

## 2019-10-09 LAB
FUNGUS (MYCOLOGY) CULTURE, FUNGCT: NORMAL
RESULT 1, 080094: NORMAL
SPECIMEN SOURCE: NORMAL

## 2019-10-24 LAB
ACID FAST STN SPEC: NEGATIVE
MYCOBACTERIUM SPEC QL CULT: NEGATIVE
SPECIMEN PREPARATION: NORMAL
SPECIMEN SOURCE: NORMAL

## 2019-10-31 ENCOUNTER — HOSPITAL ENCOUNTER (OUTPATIENT)
Dept: SLEEP MEDICINE | Age: 64
Discharge: HOME | End: 2019-10-31
Payer: COMMERCIAL

## 2019-10-31 DIAGNOSIS — G47.19 EXCESSIVE DAYTIME SLEEPINESS: ICD-10-CM

## 2019-10-31 DIAGNOSIS — R06.83 SNORING: ICD-10-CM

## 2019-10-31 DIAGNOSIS — G47.33 OSA (OBSTRUCTIVE SLEEP APNEA): ICD-10-CM

## 2019-10-31 PROCEDURE — 95810 POLYSOM 6/> YRS 4/> PARAM: CPT

## 2019-11-01 VITALS
HEART RATE: 77 BPM | HEIGHT: 61 IN | WEIGHT: 141 LBS | BODY MASS INDEX: 26.62 KG/M2 | DIASTOLIC BLOOD PRESSURE: 79 MMHG | SYSTOLIC BLOOD PRESSURE: 125 MMHG

## 2019-11-05 DIAGNOSIS — G47.33 OSA (OBSTRUCTIVE SLEEP APNEA): Primary | ICD-10-CM

## 2019-11-05 DIAGNOSIS — I49.3 PVC'S (PREMATURE VENTRICULAR CONTRACTIONS): ICD-10-CM

## 2019-11-05 NOTE — PROGRESS NOTES
The patient underwent sleep testing on 10/31/19. Recommendations listed below. Please refer to full report for specific details. Overall, the patient appeared to tolerate study well but did report sleeping worse than normal.  Continuous snoring and moderate LUCY was observed during this study. REM supine was also achieved.     <U>DIAGNOSIS: 1) Obstructive Sleep Apnea (LUCY): AHI: 18.2  2) Premature Ventricular Contractions (PVCs)    RECOMMENDATIONS:      Recommend starting patient on auto CPAP (APAP) 5/17 cwp.  Other non-invasive treatment options are recommended were applicable and include the following: weight reduction, smoking cessation, body position training, and modification of alcohol ingestion and/or sedating agents.  If these treatments are not possible or effective, an oral appliance may be an alternative and/or adjuvant non-invasive treatment.  If non-invasive treatments are not possible or effective, consideration may be given to possible corrective surgical options.  Healthy sleep habit education and reinforcement will be reviewed with the patient.  Individuals are encouraged to obtain 7-9 hours of sleep per day.   safety is encouraged. Drowsy and/or inattentive driving should be avoided.  Follow up with referring provider as directed.     Brianna Copeland DO, [facility]    [facility]  Pulmonary, Critical Care, and Sleep Medicine

## 2019-11-14 ENCOUNTER — DOCUMENTATION ONLY (OUTPATIENT)
Dept: PULMONOLOGY | Age: 64
End: 2019-11-14

## 2019-11-27 ENCOUNTER — TELEPHONE (OUTPATIENT)
Dept: PULMONOLOGY | Age: 64
End: 2019-11-27

## 2019-12-04 ENCOUNTER — TELEPHONE (OUTPATIENT)
Dept: PULMONOLOGY | Age: 64
End: 2019-12-04

## 2019-12-16 ENCOUNTER — HOSPITAL ENCOUNTER (OUTPATIENT)
Dept: LAB | Age: 64
Discharge: HOME | End: 2019-12-16

## 2019-12-16 ENCOUNTER — HOSPITAL ENCOUNTER (OUTPATIENT)
Dept: MAMMOGRAPHY | Age: 64
Discharge: HOME | End: 2019-12-16
Attending: FAMILY MEDICINE
Payer: COMMERCIAL

## 2019-12-16 DIAGNOSIS — Z12.31 VISIT FOR SCREENING MAMMOGRAM: ICD-10-CM

## 2019-12-16 LAB — XX-LABCORP SPECIMEN COL,LCBCF: NORMAL

## 2019-12-16 PROCEDURE — 77067 SCR MAMMO BI INCL CAD: CPT

## 2019-12-16 PROCEDURE — 99001 SPECIMEN HANDLING PT-LAB: CPT

## 2020-01-08 ENCOUNTER — OFFICE VISIT (OUTPATIENT)
Dept: PULMONOLOGY | Age: 65
End: 2020-01-08

## 2020-01-08 VITALS
HEART RATE: 89 BPM | SYSTOLIC BLOOD PRESSURE: 140 MMHG | DIASTOLIC BLOOD PRESSURE: 80 MMHG | OXYGEN SATURATION: 96 % | RESPIRATION RATE: 19 BRPM | BODY MASS INDEX: 26.81 KG/M2 | WEIGHT: 142 LBS | HEIGHT: 61 IN | TEMPERATURE: 97.9 F

## 2020-01-08 DIAGNOSIS — R74.8 ELEVATED CREATINE KINASE: ICD-10-CM

## 2020-01-08 DIAGNOSIS — R06.02 SHORTNESS OF BREATH: ICD-10-CM

## 2020-01-08 DIAGNOSIS — R79.82 ELEVATED C-REACTIVE PROTEIN (CRP): ICD-10-CM

## 2020-01-08 DIAGNOSIS — J84.9 ILD (INTERSTITIAL LUNG DISEASE) (HCC): Primary | ICD-10-CM

## 2020-01-08 DIAGNOSIS — R91.8 PULMONARY INFILTRATES: ICD-10-CM

## 2020-01-08 DIAGNOSIS — R06.09 DYSPNEA ON EXERTION: ICD-10-CM

## 2020-01-08 DIAGNOSIS — G47.33 OSA (OBSTRUCTIVE SLEEP APNEA): ICD-10-CM

## 2020-01-08 RX ORDER — AZITHROMYCIN 250 MG/1
TABLET, FILM COATED ORAL
COMMUNITY
Start: 2019-11-27 | End: 2020-01-08 | Stop reason: ALTCHOICE

## 2020-01-08 RX ORDER — PROMETHAZINE HYDROCHLORIDE AND DEXTROMETHORPHAN HYDROBROMIDE 6.25; 15 MG/5ML; MG/5ML
5 SYRUP ORAL
COMMUNITY
Start: 2019-12-30 | End: 2020-03-19

## 2020-01-08 RX ORDER — CYCLOBENZAPRINE HCL 10 MG
10 TABLET ORAL
COMMUNITY
Start: 2019-12-22

## 2020-01-08 RX ORDER — ISONIAZID 300 MG/1
TABLET ORAL
COMMUNITY
Start: 2019-12-30 | End: 2020-09-17

## 2020-01-08 NOTE — LETTER
1/12/20 Patient: Clois Schilder  
YOB: 1955 Date of Visit: 1/8/2020 Mesha Pro MD 
[address] [place] [id number] VIA Facsimile: [phone] Dear Mesha Pro MD, Thank you for referring Ms. Art Dudley to [address] [place] [address] for evaluation. My notes for this consultation are attached. If you have questions, please do not hesitate to call me. I look forward to following your patient along with you. Sincerely, Michelle Fofana MD

## 2020-01-08 NOTE — PROGRESS NOTES
Naya Canas presents today for   Chief Complaint   Patient presents with    Shortness of Breath     follow up from 8/15/2019    Other     ILD, pulmonary infiltrate, REYES    Results     CXR 9/10/2019, PSG 10/31/2019       Is someone accompanying this pt? Yes. Family friends    Is the patient using any DME equipment during [address]? No    -DME Company N/A    Depression Screening:  3 most recent PHQ Screens 1/8/2020   Little interest or pleasure in doing things Not at all   Feeling down, depressed, irritable, or hopeless Not at all   Total Score PHQ 2 0       Learning Assessment:  Learning Assessment 8/15/2019   PRIMARY LEARNER Patient   PRIMARY LANGUAGE ENGLISH   LEARNER PREFERENCE PRIMARY LISTENING   ANSWERED BY patient   RELATIONSHIP SELF       Abuse Screening:  Abuse Screening Questionnaire 8/15/2019   Do you ever feel afraid of your partner? N   Are you in a relationship with someone who physically or mentally threatens you? N   Is it safe for you to go home? Y       Fall Risk  No flowsheet data found. Coordination of Care:  1. Have you been to the ER, urgent care clinic since your last visit? Hospitalized since your last visit? Yes; Where: [facility], When: 9/10/2019-bronchoscopy with BALs & biopsy    2. Have you seen or consulted any other health care providers outside of the [address] [place] [address] since your last visit? Include any pap smears or colon screening. Yes. Dr. Sam Cui, PCP    Influenza vaccine received from  Dr. Elenita Dale, PCP in October 2019 per patient. Immunization record updated.

## 2020-01-13 NOTE — PROGRESS NOTES
[facility], [address] [facility]  Pulmonary, Critical Care, and Sleep Medicine    Pulmonary Office follow-up  Name: Claudette Leos 60 y.o. female  MRN: [id number]  : 1955  Service Date: 20  Chief Complaint:   Chief Complaint   Patient presents with    Shortness of Breath     follow up from 8/15/2019    Other     ILD, pulmonary infiltrate, REYES    Results     CXR 9/10/2019, PSG 10/31/2019       History of Present Illness:  (pt presents with two friends who provide some of history)  Claudette Leos is a 60 y.o. female, who presents to Pulmonary clinic for follow-up with regards to dyspnea. Patient was last seen in our clinic on 8/15/2019. In the interval, patient reports she is doing much better. Patient reports she was doing worse for a while when she found out her mother passed away. Patient reports she has felt really depressed since then. Otherwise, patient reports no episodes of dyspnea, although has dyspnea with moderate to strenuous exertion. Patient denies any cough, chest tightness, nausea, vomiting, diarrhea, fevers, chills, night sweats, voice hoarseness, hemoptysis. 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 Patient did not see rheumatology in the interval for elevated CK and CRP. Patient does report she underwent polysomnography, and was called by a DME company, but she was not told she had obstructive sleep apnea, so she did not take delivery of machine. Patient reports ongoing snoring and fatigue.   With regards to ENT, patient reports she followed up and has been started on hearing aids, which she lost 1 of.       Past Medical History:   Diagnosis Date    Anxiety     Arthritis     GERD (gastroesophageal reflux disease)     Hypertension     Interstitial lung disease (HCC)      Past Surgical History:   Procedure Laterality Date    BRONCHOSCOPY  9/10/2019         HX CHOLECYSTECTOMY  2015    HX HYSTERECTOMY      HX OTHER SURGICAL      parotid    HX OTHER SURGICAL      Salivary gland surgery      Family History   Problem Relation Age of Onset    Heart Disease Mother     Arthritis-osteo Mother     Arthritis-osteo Father     Heart Surgery Neg Hx     Heart Attack Neg Hx      Social History     Socioeconomic History    Marital status:      Spouse name: Not on file    Number of children: Not on file    Years of education: Not on file    Highest education level: Not on file   Occupational History    Not on file   Social Needs    Financial resource strain: Not on file    Food insecurity:     Worry: Not on file     Inability: Not on file    Transportation needs:     Medical: Not on file     Non-medical: Not on file   Tobacco Use    Smoking status: Never Smoker    Smokeless tobacco: Never Used   Substance and Sexual Activity    Alcohol use: No     Alcohol/week: 0.0 standard drinks    Drug use: No    Sexual activity: Not on file   Lifestyle    Physical activity:     Days per week: Not on file     Minutes per session: Not on file    Stress: Not on file   Relationships    Social connections:     Talks on phone: Not on file     Gets together: Not on file     Attends Anabaptism service: Not on file     Active member of club or organization: Not on file     Attends meetings of clubs or organizations: Not on file     Relationship status: Not on file    Intimate partner violence:     Fear of current or ex partner: Not on file     Emotionally abused: Not on file     Physically abused: Not on file     Forced sexual activity: Not on file Other Topics Concern    Not on file   Social History Narrative    Not on file     Allergies: I have reviewed the allergy hx  No Known Allergies    Medications:  I have reviewed the patient's medications  Prior to Admission medications    Medication Sig Start Date End Date Taking? Authorizing Provider   cyclobenzaprine (FLEXERIL) 10 mg tablet Take 10 mg by mouth daily as needed. 12/22/19  Yes Provider, Historical   isoniazid (NYDRAZID) 300 mg tablet TAKE 1 TABLET BY MOUTH ONCE DAILY 12/30/19  Yes Provider, Historical   promethazine-dextromethorphan (PROMETHAZINE-DM) 6.25-15 mg/5 mL syrup Take 5 mL by mouth four (4) times daily as needed. 12/30/19  Yes Provider, Historical   atorvastatin (LIPITOR) 40 mg tablet Take 1 Tab by mouth daily. 9/4/19  Yes Michael Boykin MD   budesonide-formoterol [facility]) 160-4.5 mcg/actuation HFAA Take 2 Puffs by inhalation two (2) times a day. 8/15/19  Yes Charo Pimentel MD   VITAMIN D2 50,000 unit capsule TAKE 1 CAPSULE BY MOUTH ONCE A WEEK 7/4/19  Yes Provider, Historical   hydroCHLOROthiazide (MICROZIDE) 12.5 mg capsule TAKE 1 CAPSULE BY MOUTH ONCE DAILY 7/16/19  Yes Provider, Historical   ketorolac (ACULAR) 0.5 % ophthalmic solution Administer 1 Drop to both eyes four (4) times daily. 5/1/19  Yes Provider, Historical   albuterol (PROVENTIL HFA, VENTOLIN HFA, PROAIR HFA) 90 mcg/actuation inhaler Take 1-2 Puffs by inhalation every four (4) hours as needed for Wheezing or Shortness of Breath. 8/2/19  Yes Charo Pimentel MD   aspirin delayed-release 81 mg tablet Take 1 Tab by mouth daily. 7/31/19  Yes Michael Boykin MD   dilTIAZem CD (CARTIA XT) 120 mg ER capsule Take 120 mg by mouth daily. Yes Provider, Historical   naproxen (NAPROSYN) 500 mg tablet Take 500 mg by mouth as needed. Yes Provider, Historical   omeprazole (PRILOSEC) 20 mg capsule Take 20 mg by mouth daily.    Yes Provider, Historical       Immunizations:  I have reviewed the patient's immunizations  Immunization History   Administered Date(s) Administered    Influenza Vaccine 10/08/2019       Review of Systems:  A complete review of systems was performed as stated in the HPI, all others are negative. Objective:    Physical Exam:  /80 (BP 1 Location: Left arm, BP Patient Position: At rest)   Pulse 89   Temp 97.9 °F (36.6 °C) (Oral)   Resp 19   Ht 5' 1\" (1.549 m)   Wt 64.4 kg (142 lb)   SpO2 96%   BMI 26.83 kg/m²   Vitals were personally reviewed  Gen: no acute distress, pleasant and cooperative, sitting up in chair, able to climb to exam table without difficulty  HEENT: normocephalic/atraumatic, PERRLA, EOMI, no scleral icterus, no oral lesions, fair dentition, Mallampati IV  Neck: supple, trachea midline, no JVD, no cervical and supraclavicular adenopathy  Chest: no lesions, with even rise and fall, no pectus excavatum or flail chest  CVS: regular rate rhythm, S1/S2, no murmurs/rubs/gallops  Lungs: good air entry B/L, CTABL, no wheezes/rales/rhonchi  Back: no kyphosis or scoliosis  Abdomen: soft, nontender, bowel sounds present, no hepatosplenomegaly  Ext: no pitting edema B/L, no peripheral cyanosis or clubbing  Neuro: grossly normal, AAOx3, normal strength and coordination grossly, no focal deficits  Skin: no rashes, erythema, lesions  Psych: normal memory, thought content, and processing    Labs:   I have reviewed the patient's available labs  Lab Results   Component Value Date/Time    WBC 11.9 08/14/2019 09:26 AM    Hemoglobin, POC 13.6 09/10/2019 12:25 PM    HGB 14.3 08/14/2019 09:26 AM    Hematocrit, POC 40 09/10/2019 12:25 PM    HCT 41.7 08/14/2019 09:26 AM    PLATELET 680 76/88/8249 09:26 AM    MCV 86.2 08/14/2019 09:26 AM     Lab Results   Component Value Date/Time    Sodium 139 08/14/2019 09:26 AM    Potassium 3.3 (L) 08/14/2019 09:26 AM    Chloride 103 08/14/2019 09:26 AM    CO2 28 08/14/2019 09:26 AM    Anion gap 8 08/14/2019 09:26 AM    Glucose 128 (H) 08/14/2019 09:26 AM    BUN 10 08/14/2019 09:26 AM    Creatinine 0.85 08/14/2019 09:26 AM    BUN/Creatinine ratio 12 08/14/2019 09:26 AM    GFR est AA >60 08/14/2019 09:26 AM    GFR est non-AA >60 08/14/2019 09:26 AM    Calcium 8.7 08/14/2019 09:26 AM    Bilirubin, total 0.7 08/14/2019 09:26 AM    AST (SGOT) 19 08/14/2019 09:26 AM    Alk. phosphatase 103 08/14/2019 09:26 AM    Protein, total 7.3 08/14/2019 09:26 AM    Albumin 3.6 08/14/2019 09:26 AM    Globulin 3.7 08/14/2019 09:26 AM    A-G Ratio 1.0 08/14/2019 09:26 AM    ALT (SGPT) 29 08/14/2019 09:26 AM     Labs from 8/2/2019: Elevated creatinine kinase at 869, elevated CRP at 68, positive QuantiFERON. Negative RF, ACE, ANCA panel, double-stranded DNA, SSA/SSB, JOURDAN comprehensive panel including RNP, Islas, scleroderma 70, chromatin, ribosomal, Luda 1, centromere negative    Outside records reviewed in clinic personally as follows:  -Patient seen by ENT, Dr. Mike Day on 7/11/2019, patient had bilateral impacted cerumen, and placed on hearing aids. Nasal laryngoscopy performed for hoarseness, showed LPR and strain.  -[facility], last visit on 11/6/2019, patient seen for positive QuantiFERON gold, patient denying any complaints of cough or shortness of breath, patient treated for latent TB with INH and B6 for 9 months. They also recommend follow-up with rheumatology for elevated CPK. Imaging:  I have personally reviewed patient's imaging -- no new studies  *Previous interpretation brought by patient on disc, no report available, called [facility], they are refusing to send report that was ordered by me personally. Personal review of CT scan from 8/9/2019, including inspiratory and prone imaging, show scattered groundglass opacities patchy, throughout all lung fields. PFTs: Personally interpreted from today, spirometry is suggestive of a restrictive defect, no bronchodilator response seen. Lung volumes show a mild restriction.   Diffusion capacity is mildly reduced. TTE:  I have reviewed the patient's TTE results  07/26/19   ECHO ADULT COMPLETE 07/26/2019 7/26/2019    Narrative · Left Ventricle: Normal cavity size and systolic function (ejection   fraction normal). Mild concentric hypertrophy. Estimated left ventricular   ejection fraction is 61 - 65%. No regional wall motion abnormality noted. Mild (grade 1) left ventricular diastolic dysfunction. · Right Ventricle: Normal right ventricular size and function. · No hemodynamically significant valvular pathology. · Pericardium: A circumferential pericardial effusion with a pericardial   fat pad was present. Signed by: Equilla Aschoff, MD       Assessment and Plan:  60 y.o. female with:    Impression:  1. Dyspnea/shortness of breath: Multifactorial.  Patient underwent an extensive cardiac work-up which has been mainly unremarkable. I believe there is a component of deconditioning. But the patient may have an ILD. Will need repeat CT imaging for LL GGO. Pt did NOT f/u with Rheumatology in the interval.  2.  Possible Interstitial lung disease: Based on lung windows of CT heart without contrast, scattered groundglass infiltrates seen through all lung fields. Patient works as a seamstress, exposed to [employer], otherwise no other causative exposures. No evidence of subpleural fibrosis or honeycombing. Differential diagnosis remains extensive-bronchiolitis obliterans, NSIP, etc. connective tissue panel, only has elevated CK and CRP, ANCA and JOURDAN comprehensive panel and HP panel negative. Pt did not see Rheumatology in the interval  3. Restrictive lung disease:  Seen on TGVs today  4. Overweight: Body mass index is 26.83 kg/m². 5.  Dysphagia: Etiology unclear. Pt does have GERD and LPR, now on PPI with some improvement  6.   Elevated CK  7.  QuantiFERON positive:  Pt diagnosed with latent TB, following with [facility], on INH  8.  LUCY, moderate:  Pt has not started CPAP therapy yet.  Diagnosed by polysomnography on 10/31/2019, AHI was 18.2, patient was prescribed auto CPAP [id number]. Plan:  -Repeat CT scan high resolution protocol. Advised patient that if she gets scan done in [place], to obtain disc and report and bring with her to follow-up  -Re-refer patient to rheumatology for elevated CK in the setting of ILD  -Management of latent TB per ID  -Start patient on autoCPAP [id number]. Counseled patient regarding discussing proper mask fitting with [employer] company. Counseled patient regarding full compliance including using when sleeping at night as well as with naps during the daytime. Advised patient to change mask/tubing/filters every 3 to 6 months. Advised patient to follow-up in pulmonary clinic 30 to 90 days after initiating CPAP with compliance report. Counseled patient again sleepy driving. Also counseled patient regarding proper sleep hygiene.  -Continue Symbicort 160/4.5 MCG 2 puffs twice daily, counseled patient to rinse mouth thoroughly after each use. Samples given in clinic.  -Continue albuterol HFA 1 to 2 puffs every 4 hours as needed. Proper inhaler technique counseled  -Strongly advised patient to follow-up with GI regarding globus sensation/dysphagia and severe GERD. This may make ILD worse. -Advised patient remain active  -Reassurance provided  -Immunizations reviewed, influenza vaccination up-to-date      Follow-up and Dispositions    · Return in about 2 months (around 3/8/2020).         Orders Placed This Encounter    AMB SUPPLY ORDER    CT CHEST WO CONT    REFERRAL TO RHEUMATOLOGY       Laureano Ball MD/MPH     Pulmonary, Critical Care Medicine  [facility]

## 2020-01-21 ENCOUNTER — HOSPITAL ENCOUNTER (OUTPATIENT)
Dept: CT IMAGING | Age: 65
Discharge: HOME | End: 2020-01-21
Attending: INTERNAL MEDICINE
Payer: COMMERCIAL

## 2020-01-21 DIAGNOSIS — R79.82 ELEVATED C-REACTIVE PROTEIN (CRP): ICD-10-CM

## 2020-01-21 DIAGNOSIS — R74.8 ELEVATED CREATINE KINASE: ICD-10-CM

## 2020-01-21 DIAGNOSIS — G47.33 OSA (OBSTRUCTIVE SLEEP APNEA): ICD-10-CM

## 2020-01-21 DIAGNOSIS — R06.09 DYSPNEA ON EXERTION: ICD-10-CM

## 2020-01-21 DIAGNOSIS — R06.02 SHORTNESS OF BREATH: ICD-10-CM

## 2020-01-21 DIAGNOSIS — R91.8 PULMONARY INFILTRATES: ICD-10-CM

## 2020-01-21 DIAGNOSIS — J84.9 ILD (INTERSTITIAL LUNG DISEASE) (HCC): ICD-10-CM

## 2020-01-21 PROCEDURE — 71250 CT THORAX DX C-: CPT

## 2020-02-28 RX ORDER — ASPIRIN 81 MG/1
TABLET ORAL
Qty: 100 TAB | Refills: 0 | Status: SHIPPED | OUTPATIENT
Start: 2020-02-28

## 2020-03-09 ENCOUNTER — OFFICE VISIT (OUTPATIENT)
Dept: PULMONOLOGY | Age: 65
End: 2020-03-09

## 2020-03-09 VITALS
BODY MASS INDEX: 27.3 KG/M2 | HEART RATE: 92 BPM | HEIGHT: 61 IN | RESPIRATION RATE: 18 BRPM | TEMPERATURE: 96.4 F | SYSTOLIC BLOOD PRESSURE: 122 MMHG | OXYGEN SATURATION: 97 % | WEIGHT: 144.6 LBS | DIASTOLIC BLOOD PRESSURE: 84 MMHG

## 2020-03-09 DIAGNOSIS — J98.4 RESTRICTIVE LUNG DISEASE: ICD-10-CM

## 2020-03-09 DIAGNOSIS — G47.33 OSA (OBSTRUCTIVE SLEEP APNEA): ICD-10-CM

## 2020-03-09 DIAGNOSIS — R06.00 DYSPNEA, UNSPECIFIED TYPE: Primary | ICD-10-CM

## 2020-03-09 DIAGNOSIS — R09.89 PULMONARY AIR TRAPPING: ICD-10-CM

## 2020-03-09 DIAGNOSIS — R76.12 POSITIVE QUANTIFERON-TB GOLD TEST: ICD-10-CM

## 2020-03-09 NOTE — PROGRESS NOTES
Herminia Polanco presents today for   Chief Complaint   Patient presents with    Shortness of Breath     follow up from 1/8/2020    Breathing Problem     REYES    Other     ILD, pulmonary infiltrate    Sleep Apnea    Results     CT 1/21/2020       Is someone accompanying this pt? Yes. Friends    Is the patient using any DME equipment during [address]? No    -DME Company N/A    Depression Screening:  3 most recent PHQ Screens 3/9/2020   Little interest or pleasure in doing things Not at all   Feeling down, depressed, irritable, or hopeless Not at all   Total Score PHQ 2 0       Learning Assessment:  Learning Assessment 8/15/2019   PRIMARY LEARNER Patient   PRIMARY LANGUAGE ENGLISH   LEARNER PREFERENCE PRIMARY LISTENING   ANSWERED BY patient   RELATIONSHIP SELF       Abuse Screening:  Abuse Screening Questionnaire 8/15/2019   Do you ever feel afraid of your partner? N   Are you in a relationship with someone who physically or mentally threatens you? N   Is it safe for you to go home? Y       Fall Risk  No flowsheet data found. Coordination of Care:  1. Have you been to the ER, urgent care clinic since your last visit? Hospitalized since your last visit? No    2. Have you seen or consulted any other health care providers outside of the [address] [place] [address] since your last visit? Include any pap smears or colon screening. Yes.  Dr. Rhonda Mix, PCP

## 2020-03-09 NOTE — PROGRESS NOTES
HISTORY OF PRESENT ILLNESS  Kanchan Álvarez is a 60 y.o. female following up for dyspnea. Pt was previously seen by Dr. Soniya Uriarte for complaints of dyspnea. She was found to have an abnormal CT chest and had a bronchoscopy which was negative. She did have some air trapping on CT and started Albuterol via nebulizer. Since then she has noted significant decrease in dyspnea. She denies any interval ED visits of hospital admissions for dyspnea. Pt also had complaints of insomnia and EDS. Sleep study showed LUCY with AHI of 18.2 and was prescribed autoCPAP. Pt did not take delivery of CPAP machine as she wanted to talk to MD before starting this therapy. She still complains of EDS and non refreshing sleep. She also reports occasional awakening with a strangling sensation. Pt is on INH for positive Quantiferron Gold. Review of Systems   Constitutional: Negative for chills, diaphoresis, fever, malaise/fatigue and weight loss. HENT: Positive for hearing loss (B hearing aids). Negative for congestion, ear discharge, ear pain, nosebleeds, sinus pain, sore throat and tinnitus. Eyes: Negative for blurred vision, double vision, photophobia, pain, discharge and redness. Respiratory: Positive for shortness of breath (improved). Negative for cough, hemoptysis, sputum production, wheezing and stridor. Cardiovascular: Negative for chest pain, palpitations, orthopnea, claudication, leg swelling and PND. Gastrointestinal: Negative for abdominal pain, blood in stool, constipation, diarrhea, heartburn, melena, nausea and vomiting. Genitourinary: Negative for dysuria, flank pain, frequency, hematuria and urgency. Musculoskeletal: Positive for joint pain. Negative for back pain, falls, myalgias and neck pain. Skin: Negative for itching and rash. Neurological: Negative for dizziness, tingling, tremors, sensory change, speech change, focal weakness, seizures, loss of consciousness, weakness and headaches. Endo/Heme/Allergies: Negative for environmental allergies and polydipsia. Does not bruise/bleed easily. Psychiatric/Behavioral: Negative for depression, hallucinations, memory loss, substance abuse and suicidal ideas. The patient is nervous/anxious and has insomnia. Past Medical History:   Diagnosis Date    Anxiety     Arthritis     GERD (gastroesophageal reflux disease)     Hypertension     Interstitial lung disease ([address]      Past Surgical History:   Procedure Laterality Date    BRONCHOSCOPY  9/10/2019         HX CHOLECYSTECTOMY  2015    HX HYSTERECTOMY      HX OTHER SURGICAL      parotid    HX OTHER SURGICAL      Salivary gland surgery      Current Outpatient Medications on File Prior to Visit   Medication Sig Dispense Refill    aspirin delayed-release 81 mg tablet Take 1 tablet by mouth once daily 100 Tab 0    cyclobenzaprine (FLEXERIL) 10 mg tablet Take 10 mg by mouth daily as needed.  isoniazid (NYDRAZID) 300 mg tablet TAKE 1 TABLET BY MOUTH ONCE DAILY      promethazine-dextromethorphan (PROMETHAZINE-DM) 6.25-15 mg/5 mL syrup Take 5 mL by mouth four (4) times daily as needed.  atorvastatin (LIPITOR) 40 mg tablet Take 1 Tab by mouth daily. 90 Tab 3    budesonide-formoterol (SYMBICORT) 160-4.5 mcg/actuation HFAA Take 2 Puffs by inhalation two (2) times a day. 1 Inhaler 6    VITAMIN D2 50,000 unit capsule TAKE 1 CAPSULE BY MOUTH ONCE A WEEK  5    hydroCHLOROthiazide (MICROZIDE) 12.5 mg capsule TAKE 1 CAPSULE BY MOUTH ONCE DAILY  3    ketorolac (ACULAR) 0.5 % ophthalmic solution Administer 1 Drop to both eyes four (4) times daily. 0    albuterol (PROVENTIL HFA, VENTOLIN HFA, PROAIR HFA) 90 mcg/actuation inhaler Take 1-2 Puffs by inhalation every four (4) hours as needed for Wheezing or Shortness of Breath. 1 Inhaler 4    dilTIAZem CD (CARTIA XT) 120 mg ER capsule Take 120 mg by mouth daily.  naproxen (NAPROSYN) 500 mg tablet Take 500 mg by mouth as needed.       omeprazole (PRILOSEC) 20 mg capsule Take 20 mg by mouth daily. No current facility-administered medications on file prior to visit. No Known Allergies  Family History   Problem Relation Age of Onset    Heart Disease Mother     Arthritis-osteo Mother     Arthritis-osteo Father     Heart Surgery Neg Hx     Heart Attack Neg Hx      Social History     Socioeconomic History    Marital status:      Spouse name: Not on file    Number of children: Not on file    Years of education: Not on file    Highest education level: Not on file   Occupational History    Not on file   Social Needs    Financial resource strain: Not on file    Food insecurity:     Worry: Not on file     Inability: Not on file    Transportation needs:     Medical: Not on file     Non-medical: Not on file   Tobacco Use    Smoking status: Never Smoker    Smokeless tobacco: Never Used   Substance and Sexual Activity    Alcohol use: No     Alcohol/week: 0.0 standard drinks    Drug use: No    Sexual activity: Not on file   Lifestyle    Physical activity:     Days per week: Not on file     Minutes per session: Not on file    Stress: Not on file   Relationships    Social connections:     Talks on phone: Not on file     Gets together: Not on file     Attends [employer] service: Not on file     Active member of club or organization: Not on file     Attends meetings of clubs or organizations: Not on file     Relationship status: Not on file    Intimate partner violence:     Fear of current or ex partner: Not on file     Emotionally abused: Not on file     Physically abused: Not on file     Forced sexual activity: Not on file   Other Topics Concern    Not on file   Social History Narrative    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 She has no pets. No exposure to asbestos, feathers, pets     Blood pressure 122/84, pulse 92, temperature 96.4 °F (35.8 °C), temperature source Oral, resp.  rate 18, height 5' 1\" (1.549 m), weight 65.6 kg (144 lb 9.6 oz), SpO2 97 %. ambulatory oxymetry per nurse note  Physical Exam  Constitutional:       General: She is not in acute distress. Appearance: Normal appearance. She is not ill-appearing, toxic-appearing or diaphoretic. HENT:      Head: Normocephalic and atraumatic. Right Ear: External ear normal.      Left Ear: External ear normal.      Nose: Nose normal. No congestion or rhinorrhea. Mouth/Throat:      Mouth: Mucous membranes are moist.      Pharynx: Oropharynx is clear. No oropharyngeal exudate or posterior oropharyngeal erythema. Comments: mallampati 3  Eyes:      General: No scleral icterus. Right eye: No discharge. Left eye: No discharge. Extraocular Movements: Extraocular movements intact. Conjunctiva/sclera: Conjunctivae normal.      Pupils: Pupils are equal, round, and reactive to light. Neck:      Musculoskeletal: Neck supple. No neck rigidity or muscular tenderness. Vascular: No carotid bruit. Cardiovascular:      Rate and Rhythm: Normal rate and regular rhythm. Pulses: Normal pulses. Heart sounds: Normal heart sounds. No murmur. No friction rub. No gallop. Pulmonary:      Effort: Pulmonary effort is normal. No respiratory distress. Breath sounds: Normal breath sounds. No stridor. No wheezing, rhonchi or rales. Abdominal:      General: There is no distension. Palpations: Abdomen is soft. There is no mass. Tenderness: There is no abdominal tenderness. There is no rebound. Musculoskeletal:         General: No swelling, tenderness, deformity or signs of injury. Right lower leg: No edema. Left lower leg: No edema. Lymphadenopathy:      Cervical: No cervical adenopathy. Skin:     General: Skin is warm and dry. Findings: No bruising, lesion or rash. Neurological:      General: No focal deficit present.       Mental Status: She is alert and oriented to person, place, and time.      Motor: No weakness. Coordination: Coordination normal.   Psychiatric:         Mood and Affect: Mood normal.         Behavior: Behavior normal.         Thought Content: Thought content normal.         Judgment: Judgment normal.       CT Results (most recent):  Results from Hospital Encounter encounter on 01/21/20   CT CHEST WO CONT    Narrative HIGH RESOLUTION CT CHEST    CPT CODE: 56015    COMPARISON: 7/30/2019. INDICATIONS: Mosaic attenuation at the lung bases on prior CT with high  resolution CT recommended. TECHNIQUE: Axial scanning of the chest is performed with high resolution thin  sections in both inspiration and expiration. Additional inspiratory prone images  were performed. All CT scans at this facility are performed using dose optimization technique as  appropriate to a performed exam, to include automated exposure control,  adjustment of the mA and/or kV according to patient size (including appropriate  matching first site-specific examinations), or use of iterative reconstruction  technique. FINDINGS:   Thyroid: Unremarkable in its visualized aspects. Pericardium/ Heart: No significant effusion. Lipomatous hypertrophy of the  pericardium and intra-atrial septum. Severe LAD coronary arteriosclerosis. Aorta/ Vessels: Aorta is normal in caliber. .  Mild atherosclerosis. Lymph Nodes: Unremarkable. .    Lungs: Mild mosaic lung attenuation at the bases. Mild bilateral lower lobe  bronchial wall thickening. No significant reticulation or groundglass. There are  multifocal regions of air trapping on expiration view. The mosaic attenuation  improves with prone imaging. Pleura: No effusion. Upper Abdomen: Cholelithiasis. Bones/soft tissues: Osteopenia. Degenerative disc disease. Impression IMPRESSION:     1. Mosaic attenuation with multifocal regions of air trapping.  Mild bilateral  lower lobe bronchial wall thickening which may represent bronchitis, bronchial  wall edema or reactive airway disease. 2.  Lipomatous hypertrophy of the pericardium and interatrial septum. 3.  Severe LAD coronary arteriosclerosis. 4.  Cholelithiasis. ASSESSMENT and PLAN  Encounter Diagnoses   Name Primary?  Dyspnea, unspecified type Yes    Restrictive lung disease     LUCY (obstructive sleep apnea)     Positive QuantiFERON-TB Gold test     Pulmonary air trapping      Pt with dyspnea due in large part to air trapping seen on CT. Will continue Albuterol prn and decide on maintenance inhaler therapy on future visit. Discussed sleep study results and encouraged pt to answer phone call from Jozef Creightonch[facility]. She requests that they call her friends because of her hearing impairment and language barrier. Encouraged regular activity and discussed healthy weight. Continue INH/B6 to total 9 months. Follow up with cardiology re: CAD seen on CT.   RTC 6 months  Over 50% of this 45 minute visit was spent in face to face counseling

## 2020-03-19 ENCOUNTER — OFFICE VISIT (OUTPATIENT)
Dept: CARDIOLOGY CLINIC | Age: 65
End: 2020-03-19

## 2020-03-19 VITALS
TEMPERATURE: 97.2 F | DIASTOLIC BLOOD PRESSURE: 91 MMHG | SYSTOLIC BLOOD PRESSURE: 125 MMHG | WEIGHT: 143.2 LBS | OXYGEN SATURATION: 96 % | BODY MASS INDEX: 27.03 KG/M2 | HEIGHT: 61 IN | HEART RATE: 89 BPM

## 2020-03-19 DIAGNOSIS — I25.10 CORONARY ARTERY CALCIFICATION: ICD-10-CM

## 2020-03-19 DIAGNOSIS — E78.5 HYPERLIPIDEMIA, UNSPECIFIED HYPERLIPIDEMIA TYPE: ICD-10-CM

## 2020-03-19 DIAGNOSIS — I25.10 CORONARY ARTERY CALCIFICATION: Primary | ICD-10-CM

## 2020-03-19 DIAGNOSIS — R06.02 SOB (SHORTNESS OF BREATH): ICD-10-CM

## 2020-03-19 DIAGNOSIS — J84.9 INTERSTITIAL LUNG DISEASE (HCC): ICD-10-CM

## 2020-03-19 DIAGNOSIS — Z99.89 OSA ON CPAP: ICD-10-CM

## 2020-03-19 DIAGNOSIS — I10 ESSENTIAL HYPERTENSION: ICD-10-CM

## 2020-03-19 DIAGNOSIS — I25.84 CORONARY ARTERY CALCIFICATION: Primary | ICD-10-CM

## 2020-03-19 DIAGNOSIS — I25.84 CORONARY ARTERY CALCIFICATION: ICD-10-CM

## 2020-03-19 DIAGNOSIS — G47.33 OSA ON CPAP: ICD-10-CM

## 2020-03-19 NOTE — PROGRESS NOTES
HISTORY OF PRESENT ILLNESS  Misty Zhang is a 60 y.o. female. Chest Pain (Angina)    The history is provided by the patient. This is a recurrent problem. The problem has been rapidly improving. The problem occurs rarely. The pain is associated with exertion and rest. The pain is present in the substernal region and left side. The pain is mild. The quality of the pain is described as pressure-like and dull. The pain does not radiate. Associated symptoms include shortness of breath. Pertinent negatives include no abdominal pain, no claudication, no cough, no dizziness, no fever, no headaches, no hemoptysis, no nausea, no orthopnea, no palpitations, no PND, no sputum production, no vomiting and no weakness. Shortness of Breath   The history is provided by the patient. This is a recurrent problem. The problem occurs intermittently. The problem has not changed since onset. Pertinent negatives include no fever, no headaches, no cough, no sputum production, no hemoptysis, no wheezing, no PND, no orthopnea, no chest pain, no vomiting, no abdominal pain, no rash, no leg swelling and no claudication. Cholesterol Problem   The history is provided by the patient. This is a chronic problem. The problem occurs constantly. The problem has not changed since onset. Associated symptoms include shortness of breath. Pertinent negatives include no chest pain, no abdominal pain and no headaches. Hypertension   The history is provided by the patient. This is a chronic problem. The problem occurs constantly. Associated symptoms include shortness of breath. Pertinent negatives include no chest pain, no abdominal pain and no headaches. Review of Systems   Constitutional: Negative for chills and fever. HENT: Negative for nosebleeds. Eyes: Negative for blurred vision and double vision. Respiratory: Positive for shortness of breath. Negative for cough, hemoptysis, sputum production and wheezing.     Cardiovascular: Negative for chest pain, palpitations, orthopnea, claudication, leg swelling and PND. Gastrointestinal: Positive for heartburn. Negative for abdominal pain, nausea and vomiting. Musculoskeletal: Negative for myalgias. Skin: Negative for rash. Neurological: Negative for dizziness, weakness and headaches. Endo/Heme/Allergies: Does not bruise/bleed easily. Family History   Problem Relation Age of Onset    Heart Disease Mother     Arthritis-osteo Mother     Arthritis-osteo Father     Heart Surgery Neg Hx     Heart Attack Neg Hx        Past Medical History:   Diagnosis Date    Anxiety     Arthritis     GERD (gastroesophageal reflux disease)     Hypertension     Interstitial lung disease ([address]        Past Surgical History:   Procedure Laterality Date    BRONCHOSCOPY  9/10/2019         HX CHOLECYSTECTOMY  2015    HX HYSTERECTOMY      HX OTHER SURGICAL      parotid    HX OTHER SURGICAL      Salivary gland surgery        Social History     Tobacco Use    Smoking status: Never Smoker    Smokeless tobacco: Never Used   Substance Use Topics    Alcohol use: No     Alcohol/week: 0.0 standard drinks       No Known Allergies        Visit Vitals  BP (!) 125/91 (BP 1 Location: Left arm, BP Patient Position: Sitting)   Pulse 89   Temp 97.2 °F (36.2 °C) (Temporal)   Ht 5' 1\" (1.549 m)   Wt 65 kg (143 lb 3.2 oz)   SpO2 96%   BMI 27.06 kg/m²       Physical Exam   Constitutional: She is oriented to person, place, and time. She appears well-developed and well-nourished. HENT:   Head: Normocephalic and atraumatic. Eyes: Conjunctivae are normal.   Neck: Neck supple. No JVD present. No tracheal deviation present. No thyromegaly present. Cardiovascular: Normal rate, regular rhythm, normal heart sounds and intact distal pulses. PMI is not displaced. Exam reveals no gallop, no S3 and no decreased pulses. No murmur heard. Pulmonary/Chest: No respiratory distress. She has no wheezes. She has no rales.  She exhibits no tenderness. Abdominal: Soft. There is no abdominal tenderness. Musculoskeletal:         General: No edema. Neurological: She is alert and oriented to person, place, and time. Skin: Skin is warm. Psychiatric: She has a normal mood and affect. Nursing note and vitals reviewed. Ms. Cecy Hollingsworth has a reminder for a \"due or due soon\" health maintenance. I have asked that she contact her primary care provider for follow-up on this health maintenance. I have personally reviewed patients ekg done at other facility. 2016  Sinus rhythm with occasional premature ventricular complexes   Inferior infarct , age undetermined   Cannot rule out Anterior infarct , age undetermined   Abnormal ECG   No previous ECGs available   NUCLEAR IMAGIN2016     Findings:    1. Stress images reveal normal Myoview distrubution in all the LV segments in short axis, vertical and horizontal long axis views.    2. Resting images have a normal uptake.    3. Gated images reveal normal wall motion and the ejection fraction is calculated to be 79%.    Conclusion:    1. Normal perfusion scan.    2. Normal wall motion and ejection fraction.    3. Low risk scan. SUMMARY:echo:2016  Procedure information: This was a technically difficult study. Left ventricle: Systolic function was normal. Ejection fraction was  estimated to be 60 %. No obvious wall motion abnormalities identified in  the views obtained. There was mild concentric hypertrophy. Doppler  parameters were consistent with abnormal left ventricular relaxation  (grade 1 diastolic dysfunction). Pericardium: A circumferential pericardial fat pad was present . IMPRESSION 2019  IMPRESSION:     1. Air trapping in the lung bases suggesting presence of interstitial lung  disease. Limited evaluation.  Follow-up with CT of the thorax is recommended     The final Radiology portion of this exam was provided by Dr. Catarino Garland on  2019 10:48 AM.     The final Cardiology report will follow. END RADIOLOGY PORTION OF REPORT     CARDIOLOGY REPORT:   7/2019     The patient underwent a limited noncontrast CT scan of her chest with cardiac  gating to evaluate for coronary arterial calcification. Using the Agatston  method the coronary calcium score was calculated. There is a large amount of  coronary calcification present in all 3 major coronary vessels. Coronary calcium  score calculated 393. Procedure Conclusion     Nuclear Stress Test 7/2019    Negative myocardial perfusion imaging. Myocardial perfusion imaging supports a low risk stress test.   There is a prior study available for comparison. As compared to the previous study, there are no significant changes. Interpretation Summary 7/2019       · Left Ventricle: Normal cavity size and systolic function (ejection fraction normal). Mild concentric hypertrophy. Estimated left ventricular ejection fraction is 61 - 65%. No regional wall motion abnormality noted. Mild (grade 1) left ventricular diastolic dysfunction. · Right Ventricle: Normal right ventricular size and function. · No hemodynamically significant valvular pathology. · Pericardium: A circumferential pericardial effusion with a pericardial fat pad was present. Impression: Pulmonary evaluation-8/2019  1. Dyspnea/shortness of breath: Etiology most likely due to ILD given extensive cardiac work-up that is mainly unremarkable. There may be a mild component of deconditioning, but appears to be mainly driven by ILD given groundglass opacities  2. Interstitial lung disease: Based on lung windows of CT heart without contrast, scattered groundglass infiltrates seen through all lung fields. 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 No evidence of subpleural fibrosis or honeycombing.   Differential diagnosis remains extensive-bronchiolitis obliterans, HP, NSIP, etc.   Assessment ICD-10-CM ICD-9-CM    1. Coronary artery calcification I25.10 414.00 HEPATIC FUNCTION PANEL    I25.84 414.4 LIPID PANEL    Stable. Symptom of chest pain is significantly better continue medical management   2. SOB (shortness of breath) R06.02 786.05     Significant improvement of symptoms with starting CPAP   3. Essential hypertension I10 401.9     Stable monitor   4. Interstitial lung disease ([address] J84.9 515     Continue pulmonary follow-up monitor   5. Hyperlipidemia, unspecified hyperlipidemia type E78.5 272.4     Continue statin monitor lab with PCP   6. LUCY on CPAP G47.33 327.23     Z99.89 V46.8     Now on CPAP for 1 week     6/2019  Cardiac symptoms stable. Blood pressure elevated started Toprol 50 mg a day. Follow-up lab with PCP    7/2019 - EKG - ST with right axis deviation - She stopped taking Toprol stating it made he feel bad. Continues to c/o SOB with chest pain on exertion. She also c/o epigastric burning and frequent belching. Will obtain NST, Echo and calcium score to r/o ischemia. Referral to GI for reflux symptoms. Fasting lipids ordered. 7/31/2019  CTA showing moderate calcium score. Also suggested interstitial lung disease. Negative stress test.  Remains short of breath with occasional atypical chest pains. Add Imdur and aspirin. Continue diltiazem and statin. Referred to pulmonary. If no significant pulmonary problem consider invasive cardiac work-up to assess for possible angina equivalent related shortness of breath  9/2019  Short of breath on and off. Pulmonary fibrosis as etiology. Being worked up by Dr. Clay Elmore. LDL more than 100 I have increase Lipitor  3/2020  Cardiac status stable.   Continue current treatment now on CPAP for sleep apnea  Medications Discontinued During This Encounter   Medication Reason    promethazine-dextromethorphan (PROMETHAZINE-DM) 6.25-15 mg/5 mL syrup Not A Current Medication       Orders Placed This Encounter    HEPATIC FUNCTION PANEL Standing Status:   Future     Standing Expiration Date:   9/17/2020    LIPID PANEL     Standing Status:   Future     Standing Expiration Date:   9/17/2020     Follow-up and Dispositions    · Return in about 6 months (around 9/19/2020). Follow-up and Dispositions    · Return in about 6 months (around 9/19/2020).          Minda Alvarado MD

## 2020-03-19 NOTE — PROGRESS NOTES
1. Have you been to the ER, urgent care clinic since your last visit? Hospitalized since your last visit? No    2. Have you seen or consulted any other health care providers outside of the [address] [place] [address] since your last visit? Include any pap smears or colon screening.  No

## 2020-04-29 ENCOUNTER — DOCUMENTATION ONLY (OUTPATIENT)
Dept: PULMONOLOGY | Age: 65
End: 2020-04-29

## 2020-04-29 NOTE — PROGRESS NOTES
[facility] is closing. Per Yariel Olivas at [facility], she believes Dr. Ritchie Fernandez is not going to be seeing pts in office. Spoke to Dr. Edvin Hwang, wants pts referred to [facility]. Called [facility], they stated [facility] must refer to them b/c they need all records and treatment plans from them. Called Yariel Olivas back and she will refer to [facility]. Pt last seen 3/9/20 by Dr. Melba Kathleen. Pt made aware that she will be getting a call from another [facility] dr. Rk Whitfield understands.

## 2020-09-17 ENCOUNTER — OFFICE VISIT (OUTPATIENT)
Dept: CARDIOLOGY CLINIC | Age: 65
End: 2020-09-17

## 2020-09-17 VITALS
WEIGHT: 142.4 LBS | HEIGHT: 61 IN | TEMPERATURE: 97.1 F | BODY MASS INDEX: 26.88 KG/M2 | DIASTOLIC BLOOD PRESSURE: 90 MMHG | OXYGEN SATURATION: 99 % | SYSTOLIC BLOOD PRESSURE: 130 MMHG | HEART RATE: 75 BPM

## 2020-09-17 DIAGNOSIS — G47.33 OSA ON CPAP: ICD-10-CM

## 2020-09-17 DIAGNOSIS — J84.9 INTERSTITIAL LUNG DISEASE (HCC): ICD-10-CM

## 2020-09-17 DIAGNOSIS — I10 ESSENTIAL HYPERTENSION: ICD-10-CM

## 2020-09-17 DIAGNOSIS — I25.10 CORONARY ARTERY CALCIFICATION: Primary | ICD-10-CM

## 2020-09-17 DIAGNOSIS — Z99.89 OSA ON CPAP: ICD-10-CM

## 2020-09-17 DIAGNOSIS — I25.84 CORONARY ARTERY CALCIFICATION: Primary | ICD-10-CM

## 2020-09-17 DIAGNOSIS — R06.02 SOB (SHORTNESS OF BREATH): ICD-10-CM

## 2020-09-17 NOTE — PROGRESS NOTES
1. Have you been to the ER, urgent care clinic since your last visit? Hospitalized since your last visit? No    2. Have you seen or consulted any other health care providers outside of the [address] [place] [address] since your last visit? Include any pap smears or colon screening.  No

## 2020-09-17 NOTE — PROGRESS NOTES
HISTORY OF PRESENT ILLNESS  Kirill Henry is a 60 y.o. female. Hypertension   The history is provided by the patient. This is a chronic problem. The problem occurs constantly. Associated symptoms include shortness of breath. Pertinent negatives include no chest pain, no abdominal pain and no headaches. Shortness of Breath   The history is provided by the patient. This is a recurrent problem. The problem occurs intermittently. The problem has been gradually improving. Pertinent negatives include no fever, no headaches, no cough, no sputum production, no hemoptysis, no wheezing, no PND, no orthopnea, no chest pain, no vomiting, no abdominal pain, no rash, no leg swelling and no claudication. The problem's precipitants include exercise. Cholesterol Problem   The history is provided by the patient. This is a chronic problem. The problem occurs constantly. The problem has not changed since onset. Associated symptoms include shortness of breath. Pertinent negatives include no chest pain, no abdominal pain and no headaches. Review of Systems   Constitutional: Negative for chills and fever. HENT: Negative for nosebleeds. Eyes: Negative for blurred vision and double vision. Respiratory: Positive for shortness of breath. Negative for cough, hemoptysis, sputum production and wheezing. Cardiovascular: Negative for chest pain, palpitations, orthopnea, claudication, leg swelling and PND. Gastrointestinal: Negative for abdominal pain, heartburn, nausea and vomiting. Musculoskeletal: Negative for myalgias. Skin: Negative for rash. Neurological: Negative for dizziness, weakness and headaches. Endo/Heme/Allergies: Does not bruise/bleed easily.      Family History   Problem Relation Age of Onset    Heart Disease Mother     Arthritis-osteo Mother     Arthritis-osteo Father     Heart Surgery Neg Hx     Heart Attack Neg Hx        Past Medical History:   Diagnosis Date    Anxiety     Arthritis  GERD (gastroesophageal reflux disease)     Hypertension     Interstitial lung disease ([facility] [address]        Past Surgical History:   Procedure Laterality Date    BRONCHOSCOPY  9/10/2019         HX CHOLECYSTECTOMY  2015    HX HYSTERECTOMY      HX OTHER SURGICAL      parotid    HX OTHER SURGICAL      Salivary gland surgery        Social History     Tobacco Use    Smoking status: Never Smoker    Smokeless tobacco: Never Used   Substance Use Topics    Alcohol use: No     Alcohol/week: 0.0 standard drinks       No Known Allergies        Visit Vitals  BP (!) 130/90 (BP 1 Location: Left arm, BP Patient Position: Sitting)   Pulse 75   Temp 97.1 °F (36.2 °C) (Temporal)   Ht 5' 1\" (1.549 m)   Wt 64.6 kg (142 lb 6.4 oz)   SpO2 99%   BMI 26.91 kg/m²       Physical Exam   Constitutional: She is oriented to person, place, and time. She appears well-developed and well-nourished. HENT:   Head: Normocephalic and atraumatic. Eyes: Conjunctivae are normal.   Neck: Neck supple. No JVD present. No tracheal deviation present. No thyromegaly present. Cardiovascular: Normal rate, regular rhythm, normal heart sounds and intact distal pulses. PMI is not displaced. Exam reveals no gallop, no S3 and no decreased pulses. No murmur heard. Pulmonary/Chest: No respiratory distress. She has no wheezes. She has no rales. She exhibits no tenderness. Abdominal: Soft. There is no abdominal tenderness. Musculoskeletal:         General: No edema. Neurological: She is alert and oriented to person, place, and time. Skin: Skin is warm. Psychiatric: She has a normal mood and affect. Nursing note and vitals reviewed. Ms. Melodie Rosa has a reminder for a \"due or due soon\" health maintenance. I have asked that she contact her primary care provider for follow-up on this health maintenance. I have personally reviewed patients ekg done at other facility.   5/2016  Sinus rhythm with occasional premature ventricular complexes Inferior infarct , age undetermined   Cannot rule out Anterior infarct , age undetermined   Abnormal ECG   No previous ECGs available   NUCLEAR IMAGIN2016     Findings:    1. Stress images reveal normal Myoview distrubution in all the LV segments in short axis, vertical and horizontal long axis views.    2. Resting images have a normal uptake.    3. Gated images reveal normal wall motion and the ejection fraction is calculated to be 79%.    Conclusion:    1. Normal perfusion scan.    2. Normal wall motion and ejection fraction.    3. Low risk scan. SUMMARY:echo:2016  Procedure information: This was a technically difficult study. Left ventricle: Systolic function was normal. Ejection fraction was  estimated to be 60 %. No obvious wall motion abnormalities identified in  the views obtained. There was mild concentric hypertrophy. Doppler  parameters were consistent with abnormal left ventricular relaxation  (grade 1 diastolic dysfunction). Pericardium: A circumferential pericardial fat pad was present . IMPRESSION 2019  IMPRESSION:     1. Air trapping in the lung bases suggesting presence of interstitial lung  disease. Limited evaluation. Follow-up with CT of the thorax is recommended     The final Radiology portion of this exam was provided by Dr. Nyasia Snow on  2019 10:48 AM.     The final Cardiology report will follow. END RADIOLOGY PORTION OF REPORT     CARDIOLOGY REPORT:   2019     The patient underwent a limited noncontrast CT scan of her chest with cardiac  gating to evaluate for coronary arterial calcification. Using the Agatston  method the coronary calcium score was calculated. There is a large amount of  coronary calcification present in all 3 major coronary vessels. Coronary calcium  score calculated 393. Procedure Conclusion     Nuclear Stress Test 2019    Negative myocardial perfusion imaging.  Myocardial perfusion imaging supports a low risk stress test.   There is a prior study available for comparison. As compared to the previous study, there are no significant changes. Interpretation Summary 7/2019       · Left Ventricle: Normal cavity size and systolic function (ejection fraction normal). Mild concentric hypertrophy. Estimated left ventricular ejection fraction is 61 - 65%. No regional wall motion abnormality noted. Mild (grade 1) left ventricular diastolic dysfunction. · Right Ventricle: Normal right ventricular size and function. · No hemodynamically significant valvular pathology. · Pericardium: A circumferential pericardial effusion with a pericardial fat pad was present. Impression: Pulmonary evaluation8/2019  1. Dyspnea/shortness of breath: Etiology most likely due to ILD given extensive cardiac work-up that is mainly unremarkable. There may be a mild component of deconditioning, but appears to be mainly driven by ILD given groundglass opacities  2. Interstitial lung disease: Based on lung windows of CT heart without contrast, scattered groundglass infiltrates seen through all lung fields. 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 No evidence of subpleural fibrosis or honeycombing. Differential diagnosis remains extensive-bronchiolitis obliterans, HP, NSIP, etc.       I Have personally reviewed recent relevant labs available and discussed with patient  8/2020  Cbc,bmp,lipids  Assessment         ICD-10-CM ICD-9-CM    1. Coronary artery calcification  I25.10 414.00     I25.84 414.4     Stable continue current medical management   2. SOB (shortness of breath)  R06.02 786.05     Continue treatment monitor multifactorial   3. Essential hypertension  I10 401.9     Stable monitor   4. Interstitial lung disease ([facility] [address]  J84.9 515     Continue follow-up with pulmonary continue treatment   5. LUCY on CPAP  G47.33 327.23     Z99.89 V46.8     Continue treatment     6/2019  Cardiac symptoms stable.   Blood pressure elevated started Toprol 50 mg a day. Follow-up lab with PCP    7/2019 - EKG - ST with right axis deviation - She stopped taking Toprol stating it made he feel bad. Continues to c/o SOB with chest pain on exertion. She also c/o epigastric burning and frequent belching. Will obtain NST, Echo and calcium score to r/o ischemia. Referral to GI for reflux symptoms. Fasting lipids ordered. 7/31/2019  CTA showing moderate calcium score. Also suggested interstitial lung disease. Negative stress test.  Remains short of breath with occasional atypical chest pains. Add Imdur and aspirin. Continue diltiazem and statin. Referred to pulmonary. If no significant pulmonary problem consider invasive cardiac work-up to assess for possible angina equivalent related shortness of breath  9/2019  Short of breath on and off. Pulmonary fibrosis as etiology. Being worked up by Dr. Marco Antonio Perales. LDL more than 100 I have increase Lipitor  3/2020  Cardiac status stable. Continue current treatment now on CPAP for sleep apnea  9/2020  Cardiac status stable. LDL less than 100. Continue current medical management  Medications Discontinued During This Encounter   Medication Reason    isoniazid (NYDRAZID) 300 mg tablet Not A Current Medication       No orders of the defined types were placed in this encounter. Follow-up and Dispositions    · Return in about 6 months (around 3/17/2021). Follow-up and Dispositions    · Return in about 6 months (around 3/17/2021).          Mariah Higuera MD

## 2020-11-23 ENCOUNTER — TRANSCRIBE ORDER (OUTPATIENT)
Dept: SCHEDULING | Age: 65
End: 2020-11-23

## 2020-11-23 DIAGNOSIS — Z12.31 VISIT FOR SCREENING MAMMOGRAM: Primary | ICD-10-CM

## 2021-02-01 ENCOUNTER — HOSPITAL ENCOUNTER (OUTPATIENT)
Dept: MAMMOGRAPHY | Age: 66
Discharge: HOME | End: 2021-02-01
Attending: FAMILY MEDICINE
Payer: COMMERCIAL

## 2021-02-01 DIAGNOSIS — Z12.31 VISIT FOR SCREENING MAMMOGRAM: ICD-10-CM

## 2021-02-01 PROCEDURE — 77067 SCR MAMMO BI INCL CAD: CPT

## 2021-02-03 ENCOUNTER — OFFICE VISIT (OUTPATIENT)
Dept: CARDIOLOGY CLINIC | Age: 66
End: 2021-02-03
Payer: COMMERCIAL

## 2021-02-03 VITALS
HEIGHT: 61 IN | OXYGEN SATURATION: 96 % | DIASTOLIC BLOOD PRESSURE: 92 MMHG | TEMPERATURE: 96.8 F | WEIGHT: 146 LBS | SYSTOLIC BLOOD PRESSURE: 136 MMHG | BODY MASS INDEX: 27.56 KG/M2 | HEART RATE: 99 BPM

## 2021-02-03 DIAGNOSIS — G47.33 OSA ON CPAP: ICD-10-CM

## 2021-02-03 DIAGNOSIS — Z99.89 OSA ON CPAP: ICD-10-CM

## 2021-02-03 DIAGNOSIS — E78.5 HYPERLIPIDEMIA, UNSPECIFIED HYPERLIPIDEMIA TYPE: ICD-10-CM

## 2021-02-03 DIAGNOSIS — I10 ESSENTIAL HYPERTENSION: ICD-10-CM

## 2021-02-03 DIAGNOSIS — J84.9 INTERSTITIAL LUNG DISEASE (HCC): ICD-10-CM

## 2021-02-03 DIAGNOSIS — I25.10 CORONARY ARTERY CALCIFICATION: Primary | ICD-10-CM

## 2021-02-03 DIAGNOSIS — I25.84 CORONARY ARTERY CALCIFICATION: Primary | ICD-10-CM

## 2021-02-03 DIAGNOSIS — R06.02 SOB (SHORTNESS OF BREATH): ICD-10-CM

## 2021-02-03 PROCEDURE — 99214 OFFICE O/P EST MOD 30 MIN: CPT | Performed by: INTERNAL MEDICINE

## 2021-02-03 RX ORDER — ACETAMINOPHEN AND CODEINE PHOSPHATE 300; 15 MG/1; MG/1
1 TABLET ORAL
COMMUNITY
Start: 2022-02-15 | End: 2022-02-15

## 2021-02-03 RX ORDER — KETOTIFEN FUMARATE 0.35 MG/ML
1 SOLUTION/ DROPS OPHTHALMIC 2 TIMES DAILY
COMMUNITY

## 2021-02-03 RX ORDER — FUROSEMIDE 40 MG/1
40 TABLET ORAL DAILY
Qty: 90 TAB | Refills: 2 | Status: SHIPPED | OUTPATIENT
Start: 2021-02-03 | End: 2021-11-08 | Stop reason: SDUPTHER

## 2021-02-03 NOTE — PROGRESS NOTES
HISTORY OF PRESENT ILLNESS  Daniella Ward is a 63 y.o. female. 2/2021  Patient is here for follow-up earlier than her usual visit. She has been having episodes of sudden shortness of breath and tightness this happens at nighttime. She is usually on CPAP she is to get off CPAP and go outside for air. This is happened frequently lately. Denies any edema denies any chest tightness on exertion but she cannot walk much. Denies any fever chills or cough. Hypertension  The history is provided by the patient. This is a chronic problem. The problem occurs constantly. Associated symptoms include shortness of breath. Pertinent negatives include no chest pain, no abdominal pain and no headaches. Shortness of Breath  The history is provided by the patient. This is a recurrent problem. The problem occurs intermittently. The problem has been gradually improving. Pertinent negatives include no fever, no headaches, no cough, no sputum production, no hemoptysis, no wheezing, no PND, no orthopnea, no chest pain, no vomiting, no abdominal pain, no rash, no leg swelling and no claudication. The problem's precipitants include exercise. Cholesterol Problem  The history is provided by the patient. This is a chronic problem. The problem occurs constantly. The problem has not changed since onset. Associated symptoms include shortness of breath. Pertinent negatives include no chest pain, no abdominal pain and no headaches. Review of Systems   Constitutional: Negative for chills and fever. HENT: Negative for nosebleeds. Eyes: Negative for blurred vision and double vision. Respiratory: Positive for shortness of breath. Negative for cough, hemoptysis, sputum production and wheezing. Cardiovascular: Negative for chest pain, palpitations, orthopnea, claudication, leg swelling and PND. Gastrointestinal: Negative for abdominal pain, heartburn, nausea and vomiting. Musculoskeletal: Negative for myalgias.    Skin: Negative for rash.   Neurological: Negative for dizziness, weakness and headaches.   Endo/Heme/Allergies: Does not bruise/bleed easily.     Family History   Problem Relation Age of Onset   • Heart Disease Mother    • Arthritis-osteo Mother    • Arthritis-osteo Father    • Heart Surgery Neg Hx    • Heart Attack Neg Hx        Past Medical History:   Diagnosis Date   • Anxiety    • Arthritis    • GERD (gastroesophageal reflux disease)    • Hypertension    • Interstitial lung disease ([facility])        Past Surgical History:   Procedure Laterality Date   • BRONCHOSCOPY  9/10/2019        • HX CHOLECYSTECTOMY  2015   • HX HYSTERECTOMY     • HX OTHER SURGICAL      parotid   • HX OTHER SURGICAL      Salivary gland surgery        Social History     Tobacco Use   • Smoking status: Never Smoker   • Smokeless tobacco: Never Used   Substance Use Topics   • Alcohol use: No     Alcohol/week: 0.0 standard drinks       No Known Allergies        Visit Vitals  BP (!) 136/92 (BP 1 Location: Left upper arm, BP Patient Position: Sitting, BP Cuff Size: Adult)   Pulse 99   Temp 96.8 °F (36 °C) (Temporal)   Ht 5' 1\" (1.549 m)   Wt 66.2 kg (146 lb)   SpO2 96%   BMI 27.59 kg/m²       Physical Exam   Constitutional: She is oriented to person, place, and time. She appears well-developed and well-nourished.   HENT:   Head: Normocephalic and atraumatic.   Eyes: Conjunctivae are normal.   Neck: Neck supple. No JVD present. No tracheal deviation present. No thyromegaly present.   Cardiovascular: Normal rate, regular rhythm, normal heart sounds and intact distal pulses. PMI is not displaced. Exam reveals no gallop, no S3 and no decreased pulses.   No murmur heard.  Pulmonary/Chest: No respiratory distress. She has no wheezes. She has no rales. She exhibits no tenderness.   Abdominal: Soft. There is no abdominal tenderness.   Musculoskeletal:         General: No edema.   Neurological: She is alert and oriented to person, place, and time.   Skin: Skin is  warm.   Psychiatric: She has a normal mood and affect. Nursing note and vitals reviewed. Ms. Es Dennis has a reminder for a \"due or due soon\" health maintenance. I have asked that she contact her primary care provider for follow-up on this health maintenance. I have personally reviewed patients ekg done at other facility. 2016  Sinus rhythm with occasional premature ventricular complexes   Inferior infarct , age undetermined   Cannot rule out Anterior infarct , age undetermined   Abnormal ECG   No previous ECGs available   NUCLEAR IMAGIN2016     Findings:    1. Stress images reveal normal Myoview distrubution in all the LV segments in short axis, vertical and horizontal long axis views.    2. Resting images have a normal uptake.    3. Gated images reveal normal wall motion and the ejection fraction is calculated to be 79%.    Conclusion:    1. Normal perfusion scan.    2. Normal wall motion and ejection fraction.    3. Low risk scan. SUMMARY:echo:2016  Procedure information: This was a technically difficult study. Left ventricle: Systolic function was normal. Ejection fraction was  estimated to be 60 %. No obvious wall motion abnormalities identified in  the views obtained. There was mild concentric hypertrophy. Doppler  parameters were consistent with abnormal left ventricular relaxation  (grade 1 diastolic dysfunction). Pericardium: A circumferential pericardial fat pad was present . IMPRESSION 2019  IMPRESSION:     1. Air trapping in the lung bases suggesting presence of interstitial lung  disease. Limited evaluation. Follow-up with CT of the thorax is recommended     The final Radiology portion of this exam was provided by Dr. Grecia Holland on  2019 10:48 AM.     The final Cardiology report will follow.      END RADIOLOGY PORTION OF REPORT     CARDIOLOGY REPORT:   2019     The patient underwent a limited noncontrast CT scan of her chest with cardiac  gating to evaluate for coronary arterial calcification. Using the Agatston  method the coronary calcium score was calculated. There is a large amount of  coronary calcification present in all 3 major coronary vessels. Coronary calcium  score calculated 393. Procedure Conclusion     Nuclear Stress Test 7/2019    Negative myocardial perfusion imaging. Myocardial perfusion imaging supports a low risk stress test.   There is a prior study available for comparison. As compared to the previous study, there are no significant changes. Interpretation Summary 7/2019       · Left Ventricle: Normal cavity size and systolic function (ejection fraction normal). Mild concentric hypertrophy. Estimated left ventricular ejection fraction is 61 - 65%. No regional wall motion abnormality noted. Mild (grade 1) left ventricular diastolic dysfunction. · Right Ventricle: Normal right ventricular size and function. · No hemodynamically significant valvular pathology. · Pericardium: A circumferential pericardial effusion with a pericardial fat pad was present. Impression: Pulmonary evaluation-8/2019  1. Dyspnea/shortness of breath: Etiology most likely due to ILD given extensive cardiac work-up that is mainly unremarkable. There may be a mild component of deconditioning, but appears to be mainly driven by ILD given groundglass opacities  2. Interstitial lung disease: Based on lung windows of CT heart without contrast, scattered groundglass infiltrates seen through all lung fields. 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 No evidence of subpleural fibrosis or honeycombing. Differential diagnosis remains extensive-bronchiolitis obliterans, HP, NSIP, etc.       I Have personally reviewed recent relevant labs available and discussed with patient  8/2020  Cbc,bmp,lipids        Assessment         ICD-10-CM ICD-9-CM    1.  Coronary artery calcification  I25.10 414.00 ECHO ADULT COMPLETE I25.84 414.4 NUCLEAR CARDIAC STRESS TEST      CBC WITH AUTOMATED DIFF      METABOLIC PANEL, BASIC      HEPATIC FUNCTION PANEL      LIPID PANEL    Stable con increased shortness of breath sudden onset possible ischemia   2. SOB (shortness of breath)  R06.02 786.05 ECHO ADULT COMPLETE      NUCLEAR CARDIAC STRESS TEST      CBC WITH AUTOMATED DIFF      METABOLIC PANEL, BASIC      HEPATIC FUNCTION PANEL      LIPID PANEL    Sudden onset of shortness of breath. Happens worse at night. Possible ischemia or CHF. Add diuretic do further work-up   3. Essential hypertension  I10 401.9     Stable monitor   4. Interstitial lung disease ([address]  J84.9 515     Continue follow-up with pulmonary   5. LUCY on CPAP  G47.33 327.23 ECHO ADULT COMPLETE    Z99.89 V46.8 NUCLEAR CARDIAC STRESS TEST      CBC WITH AUTOMATED DIFF      METABOLIC PANEL, BASIC      HEPATIC FUNCTION PANEL      LIPID PANEL    Continue treatment   6. Hyperlipidemia, unspecified hyperlipidemia type  E78.5 272.4 ECHO ADULT COMPLETE      NUCLEAR CARDIAC STRESS TEST      CBC WITH AUTOMATED DIFF      METABOLIC PANEL, BASIC      HEPATIC FUNCTION PANEL      LIPID PANEL    Continue with current therapy follow-up lab with PCP     6/2019  Cardiac symptoms stable. Blood pressure elevated started Toprol 50 mg a day. Follow-up lab with PCP    7/2019 - EKG - ST with right axis deviation - She stopped taking Toprol stating it made he feel bad. Continues to c/o SOB with chest pain on exertion. She also c/o epigastric burning and frequent belching. Will obtain NST, Echo and calcium score to r/o ischemia. Referral to GI for reflux symptoms. Fasting lipids ordered. 7/31/2019  CTA showing moderate calcium score. Also suggested interstitial lung disease. Negative stress test.  Remains short of breath with occasional atypical chest pains. Add Imdur and aspirin. Continue diltiazem and statin. Referred to pulmonary.   If no significant pulmonary problem consider invasive cardiac work-up to assess for possible angina equivalent related shortness of breath  9/2019  Short of breath on and off. Pulmonary fibrosis as etiology. Being worked up by Dr. Azra Laguerre. LDL more than 100 I have increase Lipitor  3/2020  Cardiac status stable. Continue current treatment now on CPAP for sleep apnea  9/2020  Cardiac status stable. LDL less than 100. Continue current medical management  2/2021  Seen for increasing shortness of breath worse at night with orthopnea and PND. No clear fluid overload I have added diuretic. Follow-up labs check echo and nuclear stress test        Medications Discontinued During This Encounter   Medication Reason    VITAMIN D2 50,000 unit capsule Not A Current Medication    ketorolac (ACULAR) 0.5 % ophthalmic solution Not A Current Medication    albuterol (PROVENTIL HFA, VENTOLIN HFA, PROAIR HFA) 90 mcg/actuation inhaler Not A Current Medication       Orders Placed This Encounter    CBC WITH AUTOMATED DIFF     Standing Status:   Future     Standing Expiration Date:   8/3/3701    METABOLIC PANEL, BASIC     Standing Status:   Future     Standing Expiration Date:   3/5/2021    HEPATIC FUNCTION PANEL     Standing Status:   Future     Standing Expiration Date:   8/4/2021    LIPID PANEL     Standing Status:   Future     Standing Expiration Date:   8/4/2021    ECHO ADULT COMPLETE     Standing Status:   Future     Standing Expiration Date:   2/3/2022     Order Specific Question:   Contrast Enhancement (Bubble Study, Definity, Optison) may be used if criteria listed in established evidence-based protocol has been identified. Answer: Yes    furosemide (LASIX) 40 mg tablet     Sig: Take 1 Tab by mouth daily. Dispense:  90 Tab     Refill:  2     Follow-up and Dispositions    · Return for F/u after tests. Follow-up and Dispositions    · Return for F/u after tests.          Alvin Tidwell MD

## 2021-02-03 NOTE — PROGRESS NOTES
1. Have you been to the ER, urgent care clinic since your last visit? Hospitalized since your last visit? No    2. Have you seen or consulted any other health care providers outside of the [address] [place] [address] since your last visit? Include any pap smears or colon screening.  No

## 2021-03-10 ENCOUNTER — HOSPITAL ENCOUNTER (OUTPATIENT)
Dept: LAB | Age: 66
Discharge: HOME | End: 2021-03-10

## 2021-03-10 ENCOUNTER — TELEPHONE (OUTPATIENT)
Dept: CARDIOLOGY CLINIC | Age: 66
End: 2021-03-10

## 2021-03-10 DIAGNOSIS — E87.6 HYPOKALEMIA: Primary | ICD-10-CM

## 2021-03-10 DIAGNOSIS — I10 ESSENTIAL HYPERTENSION: Primary | ICD-10-CM

## 2021-03-10 LAB
ALBUMIN SERPL-MCNC: 3.8 G/DL (ref 3.4–5)
ALBUMIN/GLOB SERPL: 1.1 {RATIO} (ref 0.8–1.7)
ALP SERPL-CCNC: 95 U/L (ref 45–117)
ALT SERPL-CCNC: 38 U/L (ref 13–56)
ANION GAP SERPL CALC-SCNC: 5 MMOL/L (ref 3–18)
AST SERPL-CCNC: 23 U/L (ref 10–38)
BASOPHILS # BLD: 0.1 K/UL (ref 0–0.1)
BASOPHILS NFR BLD: 1 % (ref 0–2)
BILIRUB DIRECT SERPL-MCNC: 0.1 MG/DL (ref 0–0.2)
BILIRUB SERPL-MCNC: 0.8 MG/DL (ref 0.2–1)
BUN SERPL-MCNC: 19 MG/DL (ref 7–18)
BUN/CREAT SERPL: 24 (ref 12–20)
CALCIUM SERPL-MCNC: 9.7 MG/DL (ref 8.5–10.1)
CHLORIDE SERPL-SCNC: 101 MMOL/L (ref 100–111)
CHOLEST SERPL-MCNC: 179 MG/DL
CO2 SERPL-SCNC: 35 MMOL/L (ref 21–32)
CREAT SERPL-MCNC: 0.79 MG/DL (ref 0.6–1.3)
DIFFERENTIAL METHOD BLD: NORMAL
EOSINOPHIL # BLD: 0.4 K/UL (ref 0–0.4)
EOSINOPHIL NFR BLD: 4 % (ref 0–5)
ERYTHROCYTE [DISTWIDTH] IN BLOOD BY AUTOMATED COUNT: 13.5 % (ref 11.6–14.5)
GLOBULIN SER CALC-MCNC: 3.6 G/DL (ref 2–4)
GLUCOSE SERPL-MCNC: 110 MG/DL (ref 74–99)
HCT VFR BLD AUTO: 43 % (ref 35–45)
HDLC SERPL-MCNC: 57 MG/DL (ref 40–60)
HDLC SERPL: 3.1 {RATIO} (ref 0–5)
HGB BLD-MCNC: 14.1 G/DL (ref 12–16)
LDLC SERPL CALC-MCNC: 97.8 MG/DL (ref 0–100)
LIPID PROFILE,FLP: NORMAL
LYMPHOCYTES # BLD: 3.5 K/UL (ref 0.9–3.6)
LYMPHOCYTES NFR BLD: 40 % (ref 21–52)
MCH RBC QN AUTO: 28.7 PG (ref 24–34)
MCHC RBC AUTO-ENTMCNC: 32.8 G/DL (ref 31–37)
MCV RBC AUTO: 87.4 FL (ref 74–97)
MONOCYTES # BLD: 0.7 K/UL (ref 0.05–1.2)
MONOCYTES NFR BLD: 8 % (ref 3–10)
NEUTS SEG # BLD: 4.1 K/UL (ref 1.8–8)
NEUTS SEG NFR BLD: 47 % (ref 40–73)
PLATELET # BLD AUTO: 374 K/UL (ref 135–420)
PMV BLD AUTO: 10.2 FL (ref 9.2–11.8)
POTASSIUM SERPL-SCNC: 3.3 MMOL/L (ref 3.5–5.5)
PROT SERPL-MCNC: 7.4 G/DL (ref 6.4–8.2)
RBC # BLD AUTO: 4.92 M/UL (ref 4.2–5.3)
SODIUM SERPL-SCNC: 141 MMOL/L (ref 136–145)
TRIGL SERPL-MCNC: 121 MG/DL (ref ?–150)
VLDLC SERPL CALC-MCNC: 24.2 MG/DL
WBC # BLD AUTO: 8.7 K/UL (ref 4.6–13.2)

## 2021-03-10 PROCEDURE — 80048 BASIC METABOLIC PNL TOTAL CA: CPT

## 2021-03-10 PROCEDURE — 85025 COMPLETE CBC W/AUTO DIFF WBC: CPT

## 2021-03-10 PROCEDURE — 80076 HEPATIC FUNCTION PANEL: CPT

## 2021-03-10 PROCEDURE — 80061 LIPID PANEL: CPT

## 2021-03-10 RX ORDER — POTASSIUM CHLORIDE 20 MEQ/1
20 TABLET, EXTENDED RELEASE ORAL DAILY
Qty: 30 TAB | Refills: 5 | Status: SHIPPED | OUTPATIENT
Start: 2021-03-10 | End: 2021-09-28 | Stop reason: SDUPTHER

## 2021-03-10 NOTE — TELEPHONE ENCOUNTER
----- Message from Merline Ahuja MD sent at 3/10/2021  1:15 PM EST -----  Start potassium chloride 20 M EQ p.o. once a day BMP 1 week prescription sent

## 2021-03-10 NOTE — TELEPHONE ENCOUNTER
Called and left voicemail on patient phone regarding lab/test per Dr. Grge Anderson reviewed BMP potassium low start potassium chloride 0 meq po once a day. BMP 1 week. If any questions to call office.

## 2021-03-10 NOTE — TELEPHONE ENCOUNTER
----- Message from Alida Nuñez MD sent at 3/10/2021  9:28 AM EST -----  Lab/test  reviewed  No significant abnormality

## 2021-03-10 NOTE — TELEPHONE ENCOUNTER
Called and spoke with patient regarding lab/test per Dr. Todd Flynn, lab reviewed no significant abnormality. She voices understanding and acceptance of this advice and will call back if any further questions or concerns.

## 2021-03-19 ENCOUNTER — HOSPITAL ENCOUNTER (OUTPATIENT)
Dept: LAB | Age: 66
Discharge: HOME | End: 2021-03-19

## 2021-03-19 LAB — XX-LABCORP SPECIMEN COL,LCBCF: NORMAL

## 2021-03-19 PROCEDURE — 99001 SPECIMEN HANDLING PT-LAB: CPT

## 2021-03-20 LAB
BUN SERPL-MCNC: 12 MG/DL (ref 8–27)
BUN/CREAT SERPL: 19 (ref 12–28)
CALCIUM SERPL-MCNC: 9.5 MG/DL (ref 8.7–10.3)
CHLORIDE SERPL-SCNC: 99 MMOL/L (ref 96–106)
CO2 SERPL-SCNC: 28 MMOL/L (ref 20–29)
CREAT SERPL-MCNC: 0.64 MG/DL (ref 0.57–1)
GLUCOSE SERPL-MCNC: 116 MG/DL (ref 65–99)
POTASSIUM SERPL-SCNC: 3.8 MMOL/L (ref 3.5–5.2)
SODIUM SERPL-SCNC: 144 MMOL/L (ref 134–144)

## 2021-04-07 ENCOUNTER — OFFICE VISIT (OUTPATIENT)
Dept: CARDIOLOGY CLINIC | Age: 66
End: 2021-04-07
Payer: COMMERCIAL

## 2021-04-07 VITALS
HEIGHT: 61 IN | OXYGEN SATURATION: 95 % | DIASTOLIC BLOOD PRESSURE: 89 MMHG | SYSTOLIC BLOOD PRESSURE: 124 MMHG | TEMPERATURE: 98.1 F | WEIGHT: 141.8 LBS | BODY MASS INDEX: 26.77 KG/M2 | HEART RATE: 93 BPM

## 2021-04-07 DIAGNOSIS — I10 ESSENTIAL HYPERTENSION: ICD-10-CM

## 2021-04-07 DIAGNOSIS — Z99.89 OSA ON CPAP: ICD-10-CM

## 2021-04-07 DIAGNOSIS — G47.33 OSA ON CPAP: ICD-10-CM

## 2021-04-07 DIAGNOSIS — R06.02 SOB (SHORTNESS OF BREATH): ICD-10-CM

## 2021-04-07 DIAGNOSIS — E78.5 HYPERLIPIDEMIA, UNSPECIFIED HYPERLIPIDEMIA TYPE: ICD-10-CM

## 2021-04-07 DIAGNOSIS — I25.84 CORONARY ARTERY CALCIFICATION: Primary | ICD-10-CM

## 2021-04-07 DIAGNOSIS — I25.10 CORONARY ARTERY CALCIFICATION: Primary | ICD-10-CM

## 2021-04-07 PROCEDURE — 99214 OFFICE O/P EST MOD 30 MIN: CPT | Performed by: INTERNAL MEDICINE

## 2021-04-07 NOTE — PROGRESS NOTES
HISTORY OF PRESENT ILLNESS  Von Shannon is a 63 y.o. female. 2/2021  Patient is here for follow-up earlier than her usual visit. She has been having episodes of sudden shortness of breath and tightness this happens at nighttime. She is usually on CPAP she is to get off CPAP and go outside for air. This is happened frequently lately. Denies any edema denies any chest tightness on exertion but she cannot walk much. Denies any fever chills or cough. Hypertension  The history is provided by the patient. This is a chronic problem. The problem occurs constantly. Associated symptoms include shortness of breath. Pertinent negatives include no chest pain, no abdominal pain and no headaches. Shortness of Breath  The history is provided by the patient. This is a recurrent problem. The problem occurs intermittently. The problem has been gradually improving. Pertinent negatives include no fever, no headaches, no cough, no sputum production, no hemoptysis, no wheezing, no PND, no orthopnea, no chest pain, no vomiting, no abdominal pain, no rash, no leg swelling and no claudication. The problem's precipitants include exercise. Cholesterol Problem  The history is provided by the patient. This is a chronic problem. The problem occurs constantly. The problem has not changed since onset. Associated symptoms include shortness of breath. Pertinent negatives include no chest pain, no abdominal pain and no headaches. Follow-up  Associated symptoms include shortness of breath. Pertinent negatives include no chest pain, no abdominal pain and no headaches. Review of Systems   Constitutional: Negative for chills and fever. HENT: Negative for nosebleeds. Eyes: Negative for blurred vision and double vision. Respiratory: Positive for shortness of breath. Negative for cough, hemoptysis, sputum production and wheezing.     Cardiovascular: Negative for chest pain, palpitations, orthopnea, claudication, leg swelling and PND. Gastrointestinal: Negative for abdominal pain, heartburn, nausea and vomiting. Musculoskeletal: Negative for myalgias. Skin: Negative for rash. Neurological: Negative for dizziness, weakness and headaches. Endo/Heme/Allergies: Does not bruise/bleed easily. Family History   Problem Relation Age of Onset    Heart Disease Mother     Arthritis-osteo Mother     Arthritis-osteo Father     Heart Surgery Neg Hx     Heart Attack Neg Hx        Past Medical History:   Diagnosis Date    Anxiety     Arthritis     GERD (gastroesophageal reflux disease)     Hypertension     Interstitial lung disease ([address]        Past Surgical History:   Procedure Laterality Date    BRONCHOSCOPY  9/10/2019         HX CHOLECYSTECTOMY  2015    HX HYSTERECTOMY      HX OTHER SURGICAL      parotid    HX OTHER SURGICAL      Salivary gland surgery        Social History     Tobacco Use    Smoking status: Never Smoker    Smokeless tobacco: Never Used   Substance Use Topics    Alcohol use: No     Alcohol/week: 0.0 standard drinks       No Known Allergies        Visit Vitals  /89 (BP 1 Location: Left arm, BP Patient Position: Sitting, BP Cuff Size: Adult)   Pulse 93   Temp 98.1 °F (36.7 °C) (Temporal)   Ht 5' 1\" (1.549 m)   Wt 64.3 kg (141 lb 12.8 oz)   SpO2 95%   BMI 26.79 kg/m²       Physical Exam   Constitutional: She is oriented to person, place, and time. She appears well-developed and well-nourished. HENT:   Head: Normocephalic and atraumatic. Eyes: Conjunctivae are normal.   Neck: Neck supple. No JVD present. No tracheal deviation present. No thyromegaly present. Cardiovascular: Normal rate, regular rhythm, normal heart sounds and intact distal pulses. PMI is not displaced. Exam reveals no gallop, no S3 and no decreased pulses. No murmur heard. Pulmonary/Chest: No respiratory distress. She has no wheezes. She has no rales. She exhibits no tenderness. Abdominal: Soft.  There is no abdominal tenderness. Musculoskeletal:         General: No edema. Neurological: She is alert and oriented to person, place, and time. Skin: Skin is warm. Psychiatric: She has a normal mood and affect. Nursing note and vitals reviewed. Ms. Sherryle Rump has a reminder for a \"due or due soon\" health maintenance. I have asked that she contact her primary care provider for follow-up on this health maintenance. I have personally reviewed patients ekg done at other facility. 2016  Sinus rhythm with occasional premature ventricular complexes   Inferior infarct , age undetermined   Cannot rule out Anterior infarct , age undetermined   Abnormal ECG   No previous ECGs available   NUCLEAR IMAGIN2016     Findings:    1. Stress images reveal normal Myoview distrubution in all the LV segments in short axis, vertical and horizontal long axis views.    2. Resting images have a normal uptake.    3. Gated images reveal normal wall motion and the ejection fraction is calculated to be 79%.    Conclusion:    1. Normal perfusion scan.    2. Normal wall motion and ejection fraction.    3. Low risk scan. SUMMARY:echo:2016  Procedure information: This was a technically difficult study. Left ventricle: Systolic function was normal. Ejection fraction was  estimated to be 60 %. No obvious wall motion abnormalities identified in  the views obtained. There was mild concentric hypertrophy. Doppler  parameters were consistent with abnormal left ventricular relaxation  (grade 1 diastolic dysfunction). Pericardium: A circumferential pericardial fat pad was present . IMPRESSION 2019  IMPRESSION:     1. Air trapping in the lung bases suggesting presence of interstitial lung  disease. Limited evaluation. Follow-up with CT of the thorax is recommended     The final Radiology portion of this exam was provided by Dr. Carina Muse on  2019 10:48 AM.     The final Cardiology report will follow.      END RADIOLOGY PORTION OF REPORT     CARDIOLOGY REPORT:   7/2019     The patient underwent a limited noncontrast CT scan of her chest with cardiac  gating to evaluate for coronary arterial calcification. Using the Agatston  method the coronary calcium score was calculated. There is a large amount of  coronary calcification present in all 3 major coronary vessels. Coronary calcium  score calculated 393. Procedure Conclusion     Nuclear Stress Test 7/2019    Negative myocardial perfusion imaging. Myocardial perfusion imaging supports a low risk stress test.   There is a prior study available for comparison. As compared to the previous study, there are no significant changes. Interpretation Summary 7/2019       · Left Ventricle: Normal cavity size and systolic function (ejection fraction normal). Mild concentric hypertrophy. Estimated left ventricular ejection fraction is 61 - 65%. No regional wall motion abnormality noted. Mild (grade 1) left ventricular diastolic dysfunction. · Right Ventricle: Normal right ventricular size and function. · No hemodynamically significant valvular pathology. · Pericardium: A circumferential pericardial effusion with a pericardial fat pad was present. Impression: Pulmonary evaluation8/2019  1. Dyspnea/shortness of breath: Etiology most likely due to ILD given extensive cardiac work-up that is mainly unremarkable. There may be a mild component of deconditioning, but appears to be mainly driven by ILD given groundglass opacities  2. Interstitial lung disease: Based on lung windows of CT heart without contrast, scattered groundglass infiltrates seen through all lung fields. 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 No evidence of subpleural fibrosis or honeycombing.   Differential diagnosis remains extensive-bronchiolitis obliterans, HP, NSIP, etc.       I Have personally reviewed recent relevant labs available and discussed with patient  8/2020  Cbc,bmp,lipids  Results for Sherman Ayers (MRN [id number]) as of 4/7/2021 11:15   Ref. Range 3/10/2021 07:14   ALT Latest Ref Range: 13 - 56 U/L 38   AST Latest Ref Range: 10 - 38 U/L 23   Alk. phosphatase Latest Ref Range: 45 - 117 U/L 95   Triglyceride Latest Ref Range: <150 MG/   Cholesterol, total Latest Ref Range: <200 MG/   HDL Cholesterol Latest Ref Range: 40 - 60 MG/DL 57   CHOL/HDL Ratio Latest Ref Range: 0 - 5.0   3.1   VLDL, calculated Latest Units: MG/DL 24.2   LDL, calculated Latest Ref Range: 0 - 100 MG/DL 97.8       Procedure Conclusion    Nuclear Stress Test    Nuclear Cardiac Spect Rest then Gated Stress study. Delorise Jeanmarie was used as the stressing method and agent. (Delorise Ephraim given via a 10 - 20 sec injection.)   One day myocardial perfusion study. Date: 2/19/2021. Left ventricular perfusion is normal. Myocardial perfusion imaging supports a low risk stress test.   There is a prior study available for comparison. . As compared to the previous study, there are no significant changes     Interpretation Summary    · LV: Estimated LVEF is 60 - 65%. Normal cavity size, systolic function (ejection fraction normal) and diastolic function. Mild concentric hypertrophy. Wall motion: normal.  · RV: Normal right ventricular size and function. · No hemodynamically significant valvular pathology. · Pericardium: Pericardial fat pad present. Assessment         ICD-10-CM ICD-9-CM    1. Coronary artery calcification  I25.10 414.00     I25.84 414.4     Stable negative stress test monitor   2. SOB (shortness of breath)  R06.02 786.05     Stable. Multifactorial.  Monitor   3. LUCY on CPAP  G47.33 327.23     Z99.89 V46.8     Stable monitor   4. Hyperlipidemia, unspecified hyperlipidemia type  E78.5 272.4     Stable LDL better controlled   5. Essential hypertension  I10 401.9     Stable continue     6/2019  Cardiac symptoms stable.   Blood pressure elevated started Toprol 50 mg a day.  Follow-up lab with PCP    7/2019 - EKG - ST with right axis deviation - She stopped taking Toprol stating it made he feel bad. Continues to c/o SOB with chest pain on exertion. She also c/o epigastric burning and frequent belching. Will obtain NST, Echo and calcium score to r/o ischemia. Referral to GI for reflux symptoms. Fasting lipids ordered. 7/31/2019  CTA showing moderate calcium score. Also suggested interstitial lung disease. Negative stress test.  Remains short of breath with occasional atypical chest pains. Add Imdur and aspirin. Continue diltiazem and statin. Referred to pulmonary. If no significant pulmonary problem consider invasive cardiac work-up to assess for possible angina equivalent related shortness of breath  9/2019  Short of breath on and off. Pulmonary fibrosis as etiology. Being worked up by Dr. Roxanna Flores. LDL more than 100 I have increase Lipitor  3/2020  Cardiac status stable. Continue current treatment now on CPAP for sleep apnea  9/2020  Cardiac status stable. LDL less than 100. Continue current medical management  2/2021  Seen for increasing shortness of breath worse at night with orthopnea and PND. No clear fluid overload I have added diuretic. Follow-up labs check echo and nuclear stress test  4/20/2021  Shortness of breath multifactorial.  Continue with aspirin statin and diltiazem. Negative nuclear stress test echo with normal EF monitor      There are no discontinued medications. No orders of the defined types were placed in this encounter. Follow-up and Dispositions    · Return in about 6 months (around 10/7/2021). Follow-up and Dispositions    · Return in about 6 months (around 10/7/2021).          Dea Yeung MD

## 2021-04-21 ENCOUNTER — OFFICE VISIT (OUTPATIENT)
Dept: PULMONOLOGY | Age: 66
End: 2021-04-21
Payer: COMMERCIAL

## 2021-04-21 VITALS
WEIGHT: 142.4 LBS | DIASTOLIC BLOOD PRESSURE: 83 MMHG | RESPIRATION RATE: 18 BRPM | SYSTOLIC BLOOD PRESSURE: 111 MMHG | HEIGHT: 61 IN | OXYGEN SATURATION: 97 % | BODY MASS INDEX: 26.88 KG/M2 | HEART RATE: 111 BPM | TEMPERATURE: 97 F

## 2021-04-21 DIAGNOSIS — Z99.89 OSA ON CPAP: ICD-10-CM

## 2021-04-21 DIAGNOSIS — G47.33 OSA ON CPAP: ICD-10-CM

## 2021-04-21 DIAGNOSIS — R09.89 PULMONARY AIR TRAPPING: ICD-10-CM

## 2021-04-21 DIAGNOSIS — R76.12 (QFT) QUANTIFERON-TB TEST REACTION WITHOUT ACTIVE TUBERCULOSIS: ICD-10-CM

## 2021-04-21 DIAGNOSIS — L98.9 SKIN LESION OF RIGHT LOWER LIMB: ICD-10-CM

## 2021-04-21 DIAGNOSIS — R06.00 DYSPNEA, UNSPECIFIED TYPE: Primary | ICD-10-CM

## 2021-04-21 PROBLEM — K82.9 GALLBLADDER ATTACK: Status: ACTIVE | Noted: 2021-04-21

## 2021-04-21 PROBLEM — E66.9 OBESITY: Status: ACTIVE | Noted: 2021-04-21

## 2021-04-21 PROBLEM — E07.9 THYROID DISEASE: Status: ACTIVE | Noted: 2021-04-21

## 2021-04-21 PROCEDURE — 99214 OFFICE O/P EST MOD 30 MIN: CPT | Performed by: INTERNAL MEDICINE

## 2021-04-21 RX ORDER — ERGOCALCIFEROL 1.25 MG/1
CAPSULE ORAL
COMMUNITY
Start: 2022-02-15 | End: 2022-02-15

## 2021-04-21 RX ORDER — FLUTICASONE FUROATE AND VILANTEROL TRIFENATATE 200; 25 UG/1; UG/1
1 POWDER RESPIRATORY (INHALATION) DAILY
Qty: 1 INHALER | Refills: 0 | Status: SHIPPED | COMMUNITY
Start: 2021-04-21 | End: 2021-06-17

## 2021-04-21 RX ORDER — FLUTICASONE FUROATE AND VILANTEROL 200; 25 UG/1; UG/1
1 POWDER RESPIRATORY (INHALATION) DAILY
Qty: 1 INHALER | Refills: 5 | Status: SHIPPED | OUTPATIENT
Start: 2021-04-21 | End: 2021-06-17

## 2021-04-21 NOTE — PROGRESS NOTES
Loni Green presents today for   Chief Complaint   Patient presents with    Sleep Apnea     Dyspnea. Restrictive lung disease. Pt states she has been feeling better but will have an episode of SOB once in awhile. Is someone accompanying this pt? Sister in law, Enrique Bojorquez. Is the patient using any DME equipment during OV? CPAP    -[employer]    Depression Screening:  3 most recent PHQ Screens 4/7/2021   Little interest or pleasure in doing things Not at all   Feeling down, depressed, irritable, or hopeless Not at all   Total Score PHQ 2 0       Learning Assessment:  Learning Assessment 8/15/2019   PRIMARY LEARNER Patient   PRIMARY LANGUAGE ENGLISH   LEARNER PREFERENCE PRIMARY LISTENING   ANSWERED BY patient   RELATIONSHIP SELF       Abuse Screening:  Abuse Screening Questionnaire 8/15/2019   Do you ever feel afraid of your partner? N   Are you in a relationship with someone who physically or mentally threatens you? N   Is it safe for you to go home? Y       Fall Risk  No flowsheet data found. Coordination of Care:  1. Have you been to the ER, urgent care clinic since your last visit? Hospitalized since your last visit? No    2. Have you seen or consulted any other health care providers outside of the [address] [place] [address] since your last visit? Include any pap smears or colon screening. Dr. Marlou Bumpers, PCP. Dr. Jailyn Munoz, Cardiology.

## 2021-04-21 NOTE — PROGRESS NOTES
HISTORY OF PRESENT ILLNESS  Marlin Goldsmith is a 63 y.o. female following up for dyspnea. Pt was previously seen by Dr. Annamaria Kwon for complaints of dyspnea. She was found to have an abnormal CT chest and had a bronchoscopy which was negative. She did have some air trapping on CT and started Albuterol via nebulizer. Since then she has noted significant decrease in dyspnea. She was then switched to Symbicort and Albuterol which she stopped on the advice of a friend and because she was paying $500 for each Symbicort inhaler. Since then she has noted increased SOB and cough with yellowish phlegm which at least twice a week. She denies any interval ED visits of hospital admissions for dyspnea. Pt also had complaints of insomnia and EDS. Sleep study showed LUCY with AHI of 18.2 and was prescribed autoCPAP. She notes improved symptoms with CPAP. Pt was on INH for positive Quantiferron Gold. Review of Systems   Constitutional: Negative for chills, diaphoresis, fever, malaise/fatigue and weight loss. HENT: Positive for hearing loss (B hearing aids). Negative for congestion, ear discharge, ear pain, nosebleeds, sinus pain, sore throat and tinnitus. Eyes: Negative for blurred vision, double vision, photophobia, pain, discharge and redness. Respiratory: Positive for cough, sputum production and shortness of breath (improved). Negative for hemoptysis, wheezing and stridor. Cardiovascular: Negative for chest pain, palpitations, orthopnea, claudication, leg swelling and PND. Gastrointestinal: Negative for abdominal pain, blood in stool, constipation, diarrhea, heartburn, melena, nausea and vomiting. Genitourinary: Negative for dysuria, flank pain, frequency, hematuria and urgency. Musculoskeletal: Positive for joint pain. Negative for back pain, falls, myalgias and neck pain. Skin: Negative for itching and rash.    Neurological: Negative for dizziness, tingling, tremors, sensory change, speech change, focal weakness, seizures, loss of consciousness, weakness and headaches. Endo/Heme/Allergies: Negative for environmental allergies and polydipsia. Does not bruise/bleed easily. Psychiatric/Behavioral: Negative for depression, hallucinations, memory loss, substance abuse and suicidal ideas. The patient is nervous/anxious. The patient does not have insomnia. Past Medical History:   Diagnosis Date    Anxiety     Arthritis     GERD (gastroesophageal reflux disease)     Hypertension     Interstitial lung disease ([facility] [address]      Past Surgical History:   Procedure Laterality Date    BRONCHOSCOPY  9/10/2019         HX CHOLECYSTECTOMY  2015    HX HYSTERECTOMY      HX OTHER SURGICAL      parotid    HX OTHER SURGICAL      Salivary gland surgery      Current Outpatient Medications on File Prior to Visit   Medication Sig Dispense Refill    ergocalciferol (ERGOCALCIFEROL) 1,250 mcg (50,000 unit) capsule       potassium chloride (K-DUR, KLOR-CON) 20 mEq tablet Take 1 Tab by mouth daily. 30 Tab 5    ketotifen (Zaditor) 0.025 % (0.035 %) ophthalmic solution Administer 1 Drop to both eyes two (2) times a day.  acetaminophen-codeine (TYLENOL #2) 300-15 mg tab Take 1 Tab by mouth every four (4) hours as needed.  cpap machine kit by Does Not Apply route.  furosemide (LASIX) 40 mg tablet Take 1 Tab by mouth daily. 90 Tab 2    pyridoxine HCl, vitamin B6, (VITAMIN B-6 PO) Take  by mouth.  aspirin delayed-release 81 mg tablet Take 1 tablet by mouth once daily 100 Tab 0    cyclobenzaprine (FLEXERIL) 10 mg tablet Take 10 mg by mouth daily as needed.  atorvastatin (LIPITOR) 40 mg tablet Take 1 Tab by mouth daily. 90 Tab 3    budesonide-formoterol (SYMBICORT) 160-4.5 mcg/actuation HFAA Take 2 Puffs by inhalation two (2) times a day.  1 Inhaler 6    hydroCHLOROthiazide (MICROZIDE) 12.5 mg capsule TAKE 1 CAPSULE BY MOUTH ONCE DAILY  3    dilTIAZem CD (CARTIA XT) 120 mg ER capsule Take 120 mg by mouth daily.      naproxen (NAPROSYN) 500 mg tablet Take 500 mg by mouth as needed.  omeprazole (PRILOSEC) 20 mg capsule Take 20 mg by mouth daily. No current facility-administered medications on file prior to visit. No Known Allergies  Family History   Problem Relation Age of Onset    Heart Disease Mother     Arthritis-osteo Mother     Arthritis-osteo Father     Heart Surgery Neg Hx     Heart Attack Neg Hx      Social History     Socioeconomic History    Marital status:      Spouse name: Not on file    Number of children: Not on file    Years of education: Not on file    Highest education level: Not on file   Occupational History    Not on file   Social Needs    Financial resource strain: Not on file    Food insecurity     Worry: Not on file     Inability: Not on file   [employer] needs     Medical: Not on file     Non-medical: Not on file   Tobacco Use    Smoking status: Never Smoker    Smokeless tobacco: Never Used   Substance and Sexual Activity    Alcohol use: No     Alcohol/week: 0.0 standard drinks    Drug use: No    Sexual activity: Not on file   Lifestyle    Physical activity     Days per week: Not on file     Minutes per session: Not on file    Stress: Not on file   Relationships    Social connections     Talks on phone: Not on file     Gets together: Not on file     Attends Oriental orthodox service: Not on file     Active member of club or organization: Not on file     Attends meetings of clubs or organizations: Not on file     Relationship status: Not on file    Intimate partner violence     Fear of current or ex partner: Not on file     Emotionally abused: Not on file     Physically abused: Not on file     Forced sexual activity: Not on file   Other Topics Concern    Not on file   Social History Narrative    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 She has no pets.  No exposure to asbestos, feathers, pets     Blood pressure (!) 138/109, pulse (!) 111, temperature 97 °F (36.1 °C), temperature source Oral, resp. rate 18, height 5' 1\" (1.549 m), weight 64.6 kg (142 lb 6.4 oz), SpO2 97 %. ambulatory oxymetry per nurse note  Physical Exam  Constitutional:       General: She is not in acute distress. Appearance: Normal appearance. She is not ill-appearing, toxic-appearing or diaphoretic. HENT:      Head: Normocephalic and atraumatic. Right Ear: External ear normal.      Left Ear: External ear normal.      Nose: Nose normal. No congestion or rhinorrhea. Mouth/Throat:      Mouth: Mucous membranes are moist.      Pharynx: Oropharynx is clear. No oropharyngeal exudate or posterior oropharyngeal erythema. Comments: mallampati 3  Eyes:      General: No scleral icterus. Right eye: No discharge. Left eye: No discharge. Extraocular Movements: Extraocular movements intact. Conjunctiva/sclera: Conjunctivae normal.      Pupils: Pupils are equal, round, and reactive to light. Neck:      Musculoskeletal: Neck supple. No neck rigidity or muscular tenderness. Vascular: No carotid bruit. Cardiovascular:      Rate and Rhythm: Normal rate and regular rhythm. Pulses: Normal pulses. Heart sounds: Normal heart sounds. No murmur. No friction rub. No gallop. Pulmonary:      Effort: Pulmonary effort is normal. No respiratory distress. Breath sounds: Normal breath sounds. No stridor. No wheezing, rhonchi or rales. Abdominal:      General: There is no distension. Palpations: Abdomen is soft. There is no mass. Tenderness: There is no abdominal tenderness. There is no rebound. Musculoskeletal:         General: No swelling, tenderness, deformity or signs of injury. Right lower leg: No edema. Left lower leg: No edema. Lymphadenopathy:      Cervical: No cervical adenopathy. Skin:     General: Skin is warm and dry. Findings: No bruising or rash.  Lesion: 0.6 mm hard nodular lesion medial R hallux, violaceous flat non blanching lesion 4 mm extensor surface R forearm. Neurological:      General: No focal deficit present. Mental Status: She is alert and oriented to person, place, and time. Motor: No weakness. Coordination: Coordination normal.   Psychiatric:         Mood and Affect: Mood normal.         Behavior: Behavior normal.         Thought Content: Thought content normal.         Judgment: Judgment normal.       CT Results (most recent):  Results from Hospital Encounter encounter on 01/21/20   CT CHEST WO CONT    Narrative HIGH RESOLUTION CT CHEST    CPT CODE: 97393    COMPARISON: 7/30/2019. INDICATIONS: Mosaic attenuation at the lung bases on prior CT with high  resolution CT recommended. TECHNIQUE: Axial scanning of the chest is performed with high resolution thin  sections in both inspiration and expiration. Additional inspiratory prone images  were performed. All CT scans at this facility are performed using dose optimization technique as  appropriate to a performed exam, to include automated exposure control,  adjustment of the mA and/or kV according to patient size (including appropriate  matching first site-specific examinations), or use of iterative reconstruction  technique. FINDINGS:   Thyroid: Unremarkable in its visualized aspects. Pericardium/ Heart: No significant effusion. Lipomatous hypertrophy of the  pericardium and intra-atrial septum. Severe LAD coronary arteriosclerosis. Aorta/ Vessels: Aorta is normal in caliber. .  Mild atherosclerosis. Lymph Nodes: Unremarkable. .    Lungs: Mild mosaic lung attenuation at the bases. Mild bilateral lower lobe  bronchial wall thickening. No significant reticulation or groundglass. There are  multifocal regions of air trapping on expiration view. The mosaic attenuation  improves with prone imaging. Pleura: No effusion. Upper Abdomen: Cholelithiasis. Bones/soft tissues: Osteopenia.  Degenerative disc disease. Impression IMPRESSION:     1. Mosaic attenuation with multifocal regions of air trapping. Mild bilateral  lower lobe bronchial wall thickening which may represent bronchitis, bronchial  wall edema or reactive airway disease. 2.  Lipomatous hypertrophy of the pericardium and interatrial septum. 3.  Severe LAD coronary arteriosclerosis. 4.  Cholelithiasis. ASSESSMENT and PLAN  Encounter Diagnoses   Name Primary? 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 Sample given and pt instructed on proper use. Encouraged nightly CPAP use. Healthy weight discussion. Pt completed INH/B6  Follow up with cardiology re: CAD seen on CT. RTC 6 months  Maikel Givens on next visit  Refer to Dr. Miriam Helms for re RLE and R arm lesions.

## 2021-04-23 ENCOUNTER — TELEPHONE (OUTPATIENT)
Dept: PULMONOLOGY | Age: 66
End: 2021-04-23

## 2021-04-23 NOTE — TELEPHONE ENCOUNTER
Please call Abraham Ferreira at [address] at [phone] re: needs to verify directions on Breo.

## 2021-06-16 ENCOUNTER — TELEPHONE (OUTPATIENT)
Dept: PULMONOLOGY | Age: 66
End: 2021-06-16

## 2021-06-16 NOTE — TELEPHONE ENCOUNTER
Pt states her medication Avalon Abdiel is too expensive, needs switched to a cheaper medicine. Please advise [phone].

## 2021-06-16 NOTE — TELEPHONE ENCOUNTER
Patient states the Chel Laughter was over $100 dollars. I called the pharmacy but since it went though the plan it did not show if it was due to a deductible the patient is still paying. The patient will call her insurance [employer] to see why it is so high.

## 2021-06-16 NOTE — TELEPHONE ENCOUNTER
Pt called([phone]). She wants to change back to using Symbicort. She doesn't like the other inhaler. Please check with Dr Doris Saab and call her back.

## 2021-06-17 RX ORDER — BUDESONIDE AND FORMOTEROL FUMARATE DIHYDRATE 160; 4.5 UG/1; UG/1
2 AEROSOL RESPIRATORY (INHALATION) 2 TIMES DAILY
Qty: 1 INHALER | Refills: 5 | Status: SHIPPED | OUTPATIENT
Start: 2021-06-17 | End: 2022-02-15 | Stop reason: CLARIF

## 2021-09-28 RX ORDER — POTASSIUM CHLORIDE 20 MEQ/1
20 TABLET, EXTENDED RELEASE ORAL DAILY
Qty: 30 TABLET | Refills: 5 | Status: SHIPPED | OUTPATIENT
Start: 2021-09-28 | End: 2022-04-27

## 2021-09-28 NOTE — TELEPHONE ENCOUNTER
Requested Prescriptions     Pending Prescriptions Disp Refills    potassium chloride (K-DUR, KLOR-CON) 20 mEq tablet 30 Tablet 5     Sig: Take 1 Tablet by mouth daily.

## 2021-10-06 ENCOUNTER — OFFICE VISIT (OUTPATIENT)
Dept: CARDIOLOGY CLINIC | Age: 66
End: 2021-10-06
Payer: COMMERCIAL

## 2021-10-06 VITALS
BODY MASS INDEX: 27.19 KG/M2 | HEART RATE: 86 BPM | SYSTOLIC BLOOD PRESSURE: 118 MMHG | DIASTOLIC BLOOD PRESSURE: 80 MMHG | HEIGHT: 61 IN | WEIGHT: 144 LBS

## 2021-10-06 DIAGNOSIS — I25.10 CORONARY ARTERY CALCIFICATION: Primary | ICD-10-CM

## 2021-10-06 DIAGNOSIS — E78.5 HYPERLIPIDEMIA, UNSPECIFIED HYPERLIPIDEMIA TYPE: ICD-10-CM

## 2021-10-06 DIAGNOSIS — R06.02 SOB (SHORTNESS OF BREATH): ICD-10-CM

## 2021-10-06 DIAGNOSIS — I25.84 CORONARY ARTERY CALCIFICATION: Primary | ICD-10-CM

## 2021-10-06 DIAGNOSIS — I10 ESSENTIAL HYPERTENSION: ICD-10-CM

## 2021-10-06 DIAGNOSIS — Z99.89 OSA ON CPAP: ICD-10-CM

## 2021-10-06 DIAGNOSIS — G47.33 OSA ON CPAP: ICD-10-CM

## 2021-10-06 PROCEDURE — 99214 OFFICE O/P EST MOD 30 MIN: CPT | Performed by: INTERNAL MEDICINE

## 2021-10-06 NOTE — PROGRESS NOTES
1. Have you been to the ER, urgent care clinic since your last visit? Hospitalized since your last visit? No    2. Have you seen or consulted any other health care providers outside of the [address] [place] [address] since your last visit? Include any pap smears or colon screening.  No

## 2021-10-06 NOTE — PROGRESS NOTES
HISTORY OF PRESENT ILLNESS  Daniella Ward is a 63 y.o. female. 2/2021  Patient is here for follow-up earlier than her usual visit. She has been having episodes of sudden shortness of breath and tightness this happens at nighttime. She is usually on CPAP she is to get off CPAP and go outside for air. This is happened frequently lately. Denies any edema denies any chest tightness on exertion but she cannot walk much. Denies any fever chills or cough. Follow-up  Associated symptoms include shortness of breath. Pertinent negatives include no chest pain, no abdominal pain and no headaches. Hypertension  The history is provided by the patient. This is a chronic problem. The problem occurs constantly. Associated symptoms include shortness of breath. Pertinent negatives include no chest pain, no abdominal pain and no headaches. Shortness of Breath  The history is provided by the patient. This is a recurrent problem. The problem occurs intermittently. The problem has been gradually improving. Pertinent negatives include no fever, no headaches, no cough, no sputum production, no hemoptysis, no wheezing, no PND, no orthopnea, no chest pain, no vomiting, no abdominal pain, no rash, no leg swelling and no claudication. The problem's precipitants include exercise. Cholesterol Problem  The history is provided by the patient. This is a chronic problem. The problem occurs constantly. The problem has not changed since onset. Associated symptoms include shortness of breath. Pertinent negatives include no chest pain, no abdominal pain and no headaches. Review of Systems   Constitutional: Negative for chills and fever. HENT: Negative for nosebleeds. Eyes: Negative for blurred vision and double vision. Respiratory: Positive for shortness of breath. Negative for cough, hemoptysis, sputum production and wheezing.     Cardiovascular: Negative for chest pain, palpitations, orthopnea, claudication, leg swelling and PND. Gastrointestinal: Negative for abdominal pain, heartburn, nausea and vomiting. Musculoskeletal: Negative for myalgias. Skin: Negative for rash. Neurological: Negative for dizziness, weakness and headaches. Endo/Heme/Allergies: Does not bruise/bleed easily. Family History   Problem Relation Age of Onset    Heart Disease Mother     Arthritis-osteo Mother     Arthritis-osteo Father     Heart Surgery Neg Hx     Heart Attack Neg Hx        Past Medical History:   Diagnosis Date    Anxiety     Arthritis     GERD (gastroesophageal reflux disease)     Hypertension     Interstitial lung disease ([address]        Past Surgical History:   Procedure Laterality Date    BRONCHOSCOPY  9/10/2019         HX CHOLECYSTECTOMY  2015    HX HYSTERECTOMY      HX OTHER SURGICAL      parotid    HX OTHER SURGICAL      Salivary gland surgery        Social History     Tobacco Use    Smoking status: Never Smoker    Smokeless tobacco: Never Used   Substance Use Topics    Alcohol use: No     Alcohol/week: 0.0 standard drinks       No Known Allergies        Visit Vitals  Ht 5' 1\" (1.549 m)   Wt 65.3 kg (144 lb)   BMI 27.21 kg/m²       Physical Exam  Vitals and nursing note reviewed. Constitutional:       Appearance: She is well-developed. HENT:      Head: Normocephalic and atraumatic. Eyes:      Conjunctiva/sclera: Conjunctivae normal.   Neck:      Thyroid: No thyromegaly. Vascular: No JVD. Trachea: No tracheal deviation. Cardiovascular:      Rate and Rhythm: Normal rate and regular rhythm. Chest Wall: PMI is not displaced. Pulses: Intact distal pulses. No decreased pulses. Heart sounds: Normal heart sounds. No murmur heard. No gallop. No S3 sounds. Pulmonary:      Effort: No respiratory distress. Breath sounds: No wheezing or rales. Chest:      Chest wall: No tenderness. Abdominal:      Palpations: Abdomen is soft. Tenderness:  There is no abdominal tenderness. Musculoskeletal:      Cervical back: Neck supple. Skin:     General: Skin is warm. Neurological:      Mental Status: She is alert and oriented to person, place, and time. Ms. Josesito Valencia has a reminder for a \"due or due soon\" health maintenance. I have asked that she contact her primary care provider for follow-up on this health maintenance. I have personally reviewed patients ekg done at other facility. 2016  Sinus rhythm with occasional premature ventricular complexes   Inferior infarct , age undetermined   Cannot rule out Anterior infarct , age undetermined   Abnormal ECG   No previous ECGs available   NUCLEAR IMAGIN2016     Findings:    1. Stress images reveal normal Myoview distrubution in all the LV segments in short axis, vertical and horizontal long axis views.    2. Resting images have a normal uptake.    3. Gated images reveal normal wall motion and the ejection fraction is calculated to be 79%.    Conclusion:    1. Normal perfusion scan.    2. Normal wall motion and ejection fraction.    3. Low risk scan. SUMMARY:echo:2016  Procedure information: This was a technically difficult study. Left ventricle: Systolic function was normal. Ejection fraction was  estimated to be 60 %. No obvious wall motion abnormalities identified in  the views obtained. There was mild concentric hypertrophy. Doppler  parameters were consistent with abnormal left ventricular relaxation  (grade 1 diastolic dysfunction). Pericardium: A circumferential pericardial fat pad was present . IMPRESSION 2019  IMPRESSION:     1. Air trapping in the lung bases suggesting presence of interstitial lung  disease. Limited evaluation. Follow-up with CT of the thorax is recommended     The final Radiology portion of this exam was provided by Dr. Dodie Rosales on  2019 10:48 AM.     The final Cardiology report will follow.      END RADIOLOGY PORTION OF REPORT     CARDIOLOGY REPORT:   2019     The patient underwent a limited noncontrast CT scan of her chest with cardiac  gating to evaluate for coronary arterial calcification. Using the Agatston  method the coronary calcium score was calculated. There is a large amount of  coronary calcification present in all 3 major coronary vessels. Coronary calcium  score calculated 393. Procedure Conclusion     Nuclear Stress Test 7/2019    Negative myocardial perfusion imaging. Myocardial perfusion imaging supports a low risk stress test.   There is a prior study available for comparison. As compared to the previous study, there are no significant changes. Interpretation Summary 7/2019       · Left Ventricle: Normal cavity size and systolic function (ejection fraction normal). Mild concentric hypertrophy. Estimated left ventricular ejection fraction is 61 - 65%. No regional wall motion abnormality noted. Mild (grade 1) left ventricular diastolic dysfunction. · Right Ventricle: Normal right ventricular size and function. · No hemodynamically significant valvular pathology. · Pericardium: A circumferential pericardial effusion with a pericardial fat pad was present. Impression: Pulmonary evaluation-8/2019  1. Dyspnea/shortness of breath: Etiology most likely due to ILD given extensive cardiac work-up that is mainly unremarkable. There may be a mild component of deconditioning, but appears to be mainly driven by ILD given groundglass opacities  2. Interstitial lung disease: Based on lung windows of CT heart without contrast, scattered groundglass infiltrates seen through all lung fields. 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 No evidence of subpleural fibrosis or honeycombing.   Differential diagnosis remains extensive-bronchiolitis obliterans, HP, NSIP, etc.       I Have personally reviewed recent relevant labs available and discussed with patient  8/2020  Cbc,bmp,lipids  Results for Gwendolyn Stanley (MRN [id number]) as of 4/7/2021 11:15   Ref. Range 3/10/2021 07:14   ALT Latest Ref Range: 13 - 56 U/L 38   AST Latest Ref Range: 10 - 38 U/L 23   Alk. phosphatase Latest Ref Range: 45 - 117 U/L 95   Triglyceride Latest Ref Range: <150 MG/   Cholesterol, total Latest Ref Range: <200 MG/   HDL Cholesterol Latest Ref Range: 40 - 60 MG/DL 57   CHOL/HDL Ratio Latest Ref Range: 0 - 5.0   3.1   VLDL, calculated Latest Units: MG/DL 24.2   LDL, calculated Latest Ref Range: 0 - 100 MG/DL 97.8       Procedure Conclusion    Nuclear Stress Test    Nuclear Cardiac Spect Rest then Gated Stress study. Joe Brina was used as the stressing method and agent. ([facility] given via a 10 - 20 sec injection.)   One day myocardial perfusion study. Date: 2/19/2021. Left ventricular perfusion is normal. Myocardial perfusion imaging supports a low risk stress test.   There is a prior study available for comparison. . As compared to the previous study, there are no significant changes     Interpretation Summary    · LV: Estimated LVEF is 60 - 65%. Normal cavity size, systolic function (ejection fraction normal) and diastolic function. Mild concentric hypertrophy. Wall motion: normal.  · RV: Normal right ventricular size and function. · No hemodynamically significant valvular pathology. · Pericardium: Pericardial fat pad present. Assessment         ICD-10-CM ICD-9-CM    1. Coronary artery calcification  I25.10 414.00     I25.84 414.4    2. SOB (shortness of breath)  R06.02 786.05    3. LUCY on CPAP  G47.33 327.23     Z99.89 V46.8    4. Hyperlipidemia, unspecified hyperlipidemia type  E78.5 272.4    5. Essential hypertension  I10 401.9    6. Interstitial lung disease [facility])  J84.9 515      6/2019  Cardiac symptoms stable. Blood pressure elevated started Toprol 50 mg a day. Follow-up lab with PCP    7/2019 - EKG - ST with right axis deviation - She stopped taking Toprol stating it made he feel bad.  Continues to c/o SOB with chest pain on exertion. She also c/o epigastric burning and frequent belching. Will obtain NST, Echo and calcium score to r/o ischemia. Referral to GI for reflux symptoms. Fasting lipids ordered. 7/31/2019  CTA showing moderate calcium score. Also suggested interstitial lung disease. Negative stress test.  Remains short of breath with occasional atypical chest pains. Add Imdur and aspirin. Continue diltiazem and statin. Referred to pulmonary. If no significant pulmonary problem consider invasive cardiac work-up to assess for possible angina equivalent related shortness of breath  9/2019  Short of breath on and off. Pulmonary fibrosis as etiology. Being worked up by Dr. Charles Sharma. LDL more than 100 I have increase Lipitor  3/2020  Cardiac status stable. Continue current treatment now on CPAP for sleep apnea  9/2020  Cardiac status stable. LDL less than 100. Continue current medical management  2/2021  Seen for increasing shortness of breath worse at night with orthopnea and PND. No clear fluid overload I have added diuretic. Follow-up labs check echo and nuclear stress test  4/20/2021  Shortness of breath multifactorial.  Continue with aspirin statin and diltiazem. Negative nuclear stress test echo with normal EF monitor  10/2021  Cardiac status stable continue medical management. There are no discontinued medications. No orders of the defined types were placed in this encounter.           Tobin Richards MD

## 2021-11-08 RX ORDER — FUROSEMIDE 40 MG/1
40 TABLET ORAL DAILY
Qty: 90 TABLET | Refills: 2 | Status: SHIPPED | OUTPATIENT
Start: 2021-11-08 | End: 2022-10-26

## 2021-12-08 ENCOUNTER — HOSPITAL ENCOUNTER (OUTPATIENT)
Dept: LAB | Age: 66
Discharge: HOME | End: 2021-12-08

## 2021-12-08 LAB — XX-LABCORP SPECIMEN COL,LCBCF: NORMAL

## 2021-12-08 PROCEDURE — 99001 SPECIMEN HANDLING PT-LAB: CPT

## 2022-01-10 ENCOUNTER — TELEPHONE (OUTPATIENT)
Dept: PULMONOLOGY | Age: 67
End: 2022-01-10

## 2022-01-10 DIAGNOSIS — G47.33 OSA ON CPAP: Primary | ICD-10-CM

## 2022-01-10 DIAGNOSIS — Z99.89 OSA ON CPAP: Primary | ICD-10-CM

## 2022-01-10 NOTE — TELEPHONE ENCOUNTER
Order placed for CPAP repair/replace, per Verbal Order from Dr. Antonio Hart on 1/10/2022. Last office visit: 4/21/21  Follow up Visit: 2/15/22    Provider is aware of last office visit and follow up. No further action requested from provider.

## 2022-01-25 ENCOUNTER — TRANSCRIBE ORDER (OUTPATIENT)
Dept: SCHEDULING | Age: 67
End: 2022-01-25

## 2022-01-25 DIAGNOSIS — Z12.31 VISIT FOR SCREENING MAMMOGRAM: Primary | ICD-10-CM

## 2022-01-28 ENCOUNTER — TELEPHONE (OUTPATIENT)
Dept: PULMONOLOGY | Age: 67
End: 2022-01-28

## 2022-01-28 DIAGNOSIS — Z99.89 OSA ON CPAP: Primary | ICD-10-CM

## 2022-01-28 DIAGNOSIS — G47.33 OSA ON CPAP: Primary | ICD-10-CM

## 2022-01-28 NOTE — TELEPHONE ENCOUNTER
Order placed for DME CPap replace/repair, per Verbal Order from Dr. Laura Tejeda on 1/28/2022. Provider is aware of last office visit and follow up. No further action requested from provider.

## 2022-01-28 NOTE — TELEPHONE ENCOUNTER
Medina from [facility] called([phone]). They need Dr John Duffy to send an amended order for repair/replace for cpap with pressure settings added to the order. It can be faxed to [phone].

## 2022-02-03 ENCOUNTER — HOSPITAL ENCOUNTER (OUTPATIENT)
Dept: MAMMOGRAPHY | Age: 67
Discharge: HOME | End: 2022-02-03
Attending: FAMILY MEDICINE
Payer: COMMERCIAL

## 2022-02-03 DIAGNOSIS — Z12.31 VISIT FOR SCREENING MAMMOGRAM: ICD-10-CM

## 2022-02-03 PROCEDURE — 77063 BREAST TOMOSYNTHESIS BI: CPT

## 2022-02-04 ENCOUNTER — DOCUMENTATION ONLY (OUTPATIENT)
Dept: PULMONOLOGY | Age: 67
End: 2022-02-04

## 2022-02-15 ENCOUNTER — OFFICE VISIT (OUTPATIENT)
Dept: PULMONOLOGY | Age: 67
End: 2022-02-15

## 2022-02-15 VITALS
HEART RATE: 92 BPM | OXYGEN SATURATION: 97 % | TEMPERATURE: 98.3 F | WEIGHT: 137 LBS | RESPIRATION RATE: 16 BRPM | DIASTOLIC BLOOD PRESSURE: 86 MMHG | SYSTOLIC BLOOD PRESSURE: 126 MMHG | HEIGHT: 61 IN | BODY MASS INDEX: 25.86 KG/M2

## 2022-02-15 DIAGNOSIS — J98.01 BRONCHOSPASM: ICD-10-CM

## 2022-02-15 DIAGNOSIS — R06.00 DYSPNEA, UNSPECIFIED TYPE: Primary | ICD-10-CM

## 2022-02-15 PROCEDURE — 94010 BREATHING CAPACITY TEST: CPT | Performed by: INTERNAL MEDICINE

## 2022-02-15 PROCEDURE — 99214 OFFICE O/P EST MOD 30 MIN: CPT | Performed by: INTERNAL MEDICINE

## 2022-02-15 RX ORDER — FLUTICASONE PROPIONATE AND SALMETEROL 250; 50 UG/1; UG/1
1 POWDER RESPIRATORY (INHALATION) EVERY 12 HOURS
Qty: 60 EACH | Refills: 5 | Status: SHIPPED | OUTPATIENT
Start: 2022-02-15 | End: 2022-07-01 | Stop reason: SINTOL

## 2022-02-15 NOTE — PROGRESS NOTES
Jania Mariano presents today for   Chief Complaint   Patient presents with    Sleep Apnea     follow up from 4/21/2021    Breathing Problem     dyspnea, pulmonary airtrapping    CPAP     (DME: [facility])       Is someone accompanying this pt? No    Is the patient using any DME equipment during OV? Yes. CPAP machine    -DME Company [facility]    Depression Screening:  3 most recent PHQ Screens 2/15/2022   Little interest or pleasure in doing things Not at all   Feeling down, depressed, irritable, or hopeless Not at all   Total Score PHQ 2 0       Learning Assessment:  Learning Assessment 8/15/2019   PRIMARY LEARNER Patient   PRIMARY LANGUAGE ENGLISH   LEARNER PREFERENCE PRIMARY LISTENING   ANSWERED BY patient   RELATIONSHIP SELF       Abuse Screening:  Abuse Screening Questionnaire 2/15/2022   Do you ever feel afraid of your partner? N   Are you in a relationship with someone who physically or mentally threatens you? N   Is it safe for you to go home? Y       Fall Risk  No flowsheet data found. Coordination of Care:  1. Have you been to the ER, urgent care clinic since your last visit? Hospitalized since your last visit? No    2. Have you seen or consulted any other health care providers outside of the [address] [place] [address] since your last visit? Include any pap smears or colon screening. Yes. Dr. Lori Gleason, PCP, Dr. Lisa Pablo, GI    Per patient, does not get influenza vaccine.

## 2022-02-15 NOTE — PROGRESS NOTES
HISTORY OF PRESENT ILLNESS  Brayden Chapa is a 61 y.o. female following up for dyspnea. Pt was previously seen by Dr. Navi Tellez for complaints of dyspnea. She was found to have an abnormal CT chest and had a bronchoscopy which was negative. She did have some air trapping on CT and started Albuterol with response. She was then started to Symbicort and Albuterol with even better response. A brief interval off Symbicort resulted in worse symptoms. Since being back on Symbicort, she is doing better overall and denies any interval ED visits of hospital admissions for dyspnea. She states that she is having problems affording copays for this and would like to explore alternatives. Pt also had complaints of insomnia and EDS. Sleep study showed LUCY with AHI of 18.2 and was prescribed autoCPAP. She notes improved symptoms on CPAP. She is waiting for replacement parts from DME company. PMH includes pt positive Quantif[employer]on Gold. She completed a course of INH. Breathing Problem   Associated symptoms include joint pain and cough. Pertinent negatives include no chest pain, no abdominal pain, no diarrhea, no nausea, no vomiting, no dysuria, no congestion, no ear pain, no headaches, no sinus pain, no sore throat, no neck pain, no rash and no wheezing. Review of Systems   Constitutional: Negative for chills, diaphoresis, fever, malaise/fatigue and weight loss. HENT: Positive for hearing loss (B hearing aids). Negative for congestion, ear discharge, ear pain, nosebleeds, sinus pain, sore throat and tinnitus. Eyes: Negative for blurred vision, double vision, photophobia, pain, discharge and redness. Respiratory: Positive for cough, sputum production and shortness of breath (improved). Negative for hemoptysis, wheezing and stridor. Cardiovascular: Negative for chest pain, palpitations, orthopnea, claudication, leg swelling and PND.    Gastrointestinal: Negative for abdominal pain, blood in stool, constipation, diarrhea, heartburn, melena, nausea and vomiting. Genitourinary: Negative for dysuria, flank pain, frequency, hematuria and urgency. Musculoskeletal: Positive for joint pain. Negative for back pain, falls, myalgias and neck pain. Skin: Negative for itching and rash. Neurological: Negative for dizziness, tingling, tremors, sensory change, speech change, focal weakness, seizures, loss of consciousness, weakness and headaches. Endo/Heme/Allergies: Negative for environmental allergies and polydipsia. Does not bruise/bleed easily. Psychiatric/Behavioral: Negative for depression, hallucinations, memory loss, substance abuse and suicidal ideas. The patient is nervous/anxious. The patient does not have insomnia. Past Medical History:   Diagnosis Date    Anxiety     Arthritis     GERD (gastroesophageal reflux disease)     Hypertension     Interstitial lung disease ([facility] [address]      Past Surgical History:   Procedure Laterality Date    BRONCHOSCOPY  9/10/2019         HX CHOLECYSTECTOMY  2015    HX HYSTERECTOMY      HX OTHER SURGICAL      parotid    HX OTHER SURGICAL      Salivary gland surgery      Current Outpatient Medications on File Prior to Visit   Medication Sig Dispense Refill    furosemide (LASIX) 40 mg tablet Take 1 Tablet by mouth daily. 90 Tablet 2    ketotifen (Zaditor) 0.025 % (0.035 %) ophthalmic solution Administer 1 Drop to both eyes two (2) times a day.  cpap machine kit by Does Not Apply route. Indications: DME: [employer]      pyridoxine HCl, vitamin B6, (VITAMIN B-6 PO) Take 1 Tablet by mouth daily.  aspirin delayed-release 81 mg tablet Take 1 tablet by mouth once daily 100 Tab 0    cyclobenzaprine (FLEXERIL) 10 mg tablet Take 10 mg by mouth daily as needed.  atorvastatin (LIPITOR) 40 mg tablet Take 1 Tab by mouth daily.  (Patient taking differently: Take 40 mg by mouth nightly.) 90 Tab 3    hydroCHLOROthiazide (MICROZIDE) 12.5 mg capsule Take 12.5 mg by mouth nightly. 3    dilTIAZem CD (CARTIA XT) 120 mg ER capsule Take 120 mg by mouth daily.  naproxen (NAPROSYN) 500 mg tablet Take 500 mg by mouth as needed.  omeprazole (PRILOSEC) 20 mg capsule Take 20 mg by mouth daily.  potassium chloride (K-DUR, KLOR-CON) 20 mEq tablet Take 1 Tablet by mouth daily. (Patient not taking: Reported on 2/15/2022) 30 Tablet 5    ergocalciferol (ERGOCALCIFEROL) 1,250 mcg (50,000 unit) capsule  (Patient not taking: Reported on 2/15/2022)      acetaminophen-codeine (TYLENOL #2) 300-15 mg tab Take 1 Tab by mouth every four (4) hours as needed. (Patient not taking: Reported on 2/15/2022)       No current facility-administered medications on file prior to visit. No Known Allergies  Family History   Problem Relation Age of Onset    Heart Disease Mother     OSTEOARTHRITIS Mother     OSTEOARTHRITIS Father     Heart Surgery Neg Hx     Heart Attack Neg Hx      Social History     Socioeconomic History    Marital status:      Spouse name: Not on file    Number of children: Not on file    Years of education: Not on file    Highest education level: Not on file   Occupational History    Not on file   Tobacco Use    Smoking status: Never Smoker    Smokeless tobacco: Never Used   Vaping Use    Vaping Use: Never used   Substance and Sexual Activity    Alcohol use: No     Alcohol/week: 0.0 standard drinks    Drug use: No    Sexual activity: Not on file   Other Topics Concern    Not on file   Social History Narrative    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 She has no pets. No exposure to asbestos, feathers, pets     Social Determinants of Health     Financial Resource Strain:     Difficulty of Paying Living Expenses: Not on file   Food Insecurity:     Worried About Running Out of Food in the Last Year: Not on file    Reymundo of Food in the Last Year: Not on file   Transportation Needs:     Lack of Transportation (Medical):  Not on file  Lack of Transportation (Non-Medical): Not on file   Physical Activity:     Days of Exercise per Week: Not on file    Minutes of Exercise per Session: Not on file   Stress:     Feeling of Stress : Not on file   Social Connections:     Frequency of Communication with Friends and Family: Not on file    Frequency of Social Gatherings with Friends and Family: Not on file    Attends Religion Services: Not on file    Active Member of [address] [place] [address] or Organizations: Not on file    Attends Club or Organization Meetings: Not on file    Marital Status: Not on file   Intimate Partner Violence:     Fear of Current or Ex-Partner: Not on file    Emotionally Abused: Not on file    Physically Abused: Not on file    Sexually Abused: Not on file   Housing Stability:     Unable to Pay for Housing in the Last Year: Not on file    Number of [place] in the Last Year: Not on file    Unstable Housing in the Last Year: Not on file     Blood pressure 126/86, pulse 92, temperature 98.3 °F (36.8 °C), temperature source Oral, resp. rate 16, height 5' 1\" (1.549 m), weight 62.1 kg (137 lb), SpO2 97 %. ambulatory oxymetry per nurse note  Physical Exam  Constitutional:       General: She is not in acute distress. Appearance: Normal appearance. She is not ill-appearing, toxic-appearing or diaphoretic. HENT:      Head: Normocephalic and atraumatic. Right Ear: External ear normal.      Left Ear: External ear normal.      Nose: No congestion or rhinorrhea. Comments: Deferred      Mouth/Throat:      Pharynx: No oropharyngeal exudate or posterior oropharyngeal erythema. Comments: Deferred   Eyes:      General: No scleral icterus. Right eye: No discharge. Left eye: No discharge. Extraocular Movements: Extraocular movements intact. Conjunctiva/sclera: Conjunctivae normal.      Pupils: Pupils are equal, round, and reactive to light. Neck:      Vascular: No carotid bruit.    Cardiovascular: Rate and Rhythm: Normal rate and regular rhythm. Pulses: Normal pulses. Heart sounds: Normal heart sounds. No murmur heard. No friction rub. No gallop. Pulmonary:      Effort: Pulmonary effort is normal. No respiratory distress. Breath sounds: Normal breath sounds. No stridor. No wheezing, rhonchi or rales. Abdominal:      General: There is no distension. Palpations: Abdomen is soft. There is no mass. Tenderness: There is no abdominal tenderness. There is no rebound. Musculoskeletal:         General: No swelling, tenderness, deformity or signs of injury. Cervical back: Neck supple. No rigidity or tenderness. No muscular tenderness. Right lower leg: No edema. Left lower leg: No edema. Lymphadenopathy:      Cervical: No cervical adenopathy. Skin:     General: Skin is warm and dry. Coloration: Skin is not jaundiced or pale. Findings: No bruising, erythema, lesion or rash. Neurological:      General: No focal deficit present. Mental Status: She is alert and oriented to person, place, and time. Motor: No weakness. Coordination: Coordination normal.   Psychiatric:         Mood and Affect: Mood normal.         Behavior: Behavior normal.         Thought Content: Thought content normal.         Judgment: Judgment normal.       CT Results (most recent):  Results from Hospital Encounter encounter on 01/21/20    CT CHEST WO CONT    Narrative  HIGH RESOLUTION CT CHEST    CPT CODE: 39204    COMPARISON: 7/30/2019. INDICATIONS: Mosaic attenuation at the lung bases on prior CT with high  resolution CT recommended. TECHNIQUE: Axial scanning of the chest is performed with high resolution thin  sections in both inspiration and expiration. Additional inspiratory prone images  were performed.     All CT scans at this facility are performed using dose optimization technique as  appropriate to a performed exam, to include automated exposure control,  adjustment of the mA and/or kV according to patient size (including appropriate  matching first site-specific examinations), or use of iterative reconstruction  technique. FINDINGS:  Thyroid: Unremarkable in its visualized aspects. Pericardium/ Heart: No significant effusion. Lipomatous hypertrophy of the  pericardium and intra-atrial septum. Severe LAD coronary arteriosclerosis. Aorta/ Vessels: Aorta is normal in caliber. .  Mild atherosclerosis. Lymph Nodes: Unremarkable. .    Lungs: Mild mosaic lung attenuation at the bases. Mild bilateral lower lobe  bronchial wall thickening. No significant reticulation or groundglass. There are  multifocal regions of air trapping on expiration view. The mosaic attenuation  improves with prone imaging. Pleura: No effusion. Upper Abdomen: Cholelithiasis. Bones/soft tissues: Osteopenia. Degenerative disc disease. Impression  IMPRESSION:    1. Mosaic attenuation with multifocal regions of air trapping. Mild bilateral  lower lobe bronchial wall thickening which may represent bronchitis, bronchial  wall edema or reactive airway disease. 2.  Lipomatous hypertrophy of the pericardium and interatrial septum. 3.  Severe LAD coronary arteriosclerosis. 4.  Cholelithiasis. ASSESSMENT and PLAN  Encounter Diagnoses   Name Primary?  Dyspnea, unspecified type Yes    Bronchospasm      Pt with dyspnea due in large part to air trapping due to either asthma vs RAD, akanksha with response to bronchodilators  Will continue Albuterol prn and Rx Wixela. Pt to call if copays are higher than Symbicort's. Will call [employer] again re: replacement parts for CPAP. Healthy weight discussion.   RTC 6 months  Linda Frias results reviewed with pt and

## 2022-02-25 ENCOUNTER — TELEPHONE (OUTPATIENT)
Dept: PULMONOLOGY | Age: 67
End: 2022-02-25

## 2022-03-18 PROBLEM — K82.9 GALLBLADDER ATTACK: Status: ACTIVE | Noted: 2021-04-21

## 2022-03-19 PROBLEM — G47.33 OSA ON CPAP: Status: ACTIVE | Noted: 2020-03-19

## 2022-03-19 PROBLEM — E66.9 OBESITY: Status: ACTIVE | Noted: 2021-04-21

## 2022-03-19 PROBLEM — R25.2 CRAMP OF BOTH LOWER EXTREMITIES: Status: ACTIVE | Noted: 2017-06-30

## 2022-03-19 PROBLEM — E78.5 HYPERLIPIDEMIA: Status: ACTIVE | Noted: 2017-06-30

## 2022-03-19 PROBLEM — Z99.89 OSA ON CPAP: Status: ACTIVE | Noted: 2020-03-19

## 2022-03-20 PROBLEM — J84.9 INTERSTITIAL LUNG DISEASE (HCC): Status: ACTIVE | Noted: 2019-09-04

## 2022-03-20 PROBLEM — E07.9 THYROID DISEASE: Status: ACTIVE | Noted: 2021-04-21

## 2022-04-27 ENCOUNTER — OFFICE VISIT (OUTPATIENT)
Dept: CARDIOLOGY CLINIC | Age: 67
End: 2022-04-27
Payer: COMMERCIAL

## 2022-04-27 VITALS
OXYGEN SATURATION: 99 % | DIASTOLIC BLOOD PRESSURE: 78 MMHG | HEIGHT: 61 IN | SYSTOLIC BLOOD PRESSURE: 126 MMHG | BODY MASS INDEX: 25.11 KG/M2 | HEART RATE: 84 BPM | WEIGHT: 133 LBS

## 2022-04-27 DIAGNOSIS — J84.9 INTERSTITIAL LUNG DISEASE (HCC): ICD-10-CM

## 2022-04-27 DIAGNOSIS — G47.33 OSA ON CPAP: ICD-10-CM

## 2022-04-27 DIAGNOSIS — I25.10 CORONARY ARTERY CALCIFICATION: Primary | ICD-10-CM

## 2022-04-27 DIAGNOSIS — R06.02 SOB (SHORTNESS OF BREATH): ICD-10-CM

## 2022-04-27 DIAGNOSIS — I25.84 CORONARY ARTERY CALCIFICATION: Primary | ICD-10-CM

## 2022-04-27 DIAGNOSIS — I10 ESSENTIAL HYPERTENSION: ICD-10-CM

## 2022-04-27 DIAGNOSIS — E78.5 HYPERLIPIDEMIA, UNSPECIFIED HYPERLIPIDEMIA TYPE: ICD-10-CM

## 2022-04-27 DIAGNOSIS — Z99.89 OSA ON CPAP: ICD-10-CM

## 2022-04-27 PROCEDURE — 99214 OFFICE O/P EST MOD 30 MIN: CPT | Performed by: INTERNAL MEDICINE

## 2022-04-27 NOTE — PROGRESS NOTES
1. Have you been to the ER, urgent care clinic since your last visit? Hospitalized since your last visit?     no    2. Have you seen or consulted any other health care providers outside of the [address] [place] [address] since your last visit? Include any pap smears or colon screening.       No

## 2022-04-27 NOTE — PROGRESS NOTES
HISTORY OF PRESENT ILLNESS  Tisha Huang is a 61 y.o. female. 2/2021  Patient is here for follow-up earlier than her usual visit. She has been having episodes of sudden shortness of breath and tightness this happens at nighttime. She is usually on CPAP she is to get off CPAP and go outside for air. This is happened frequently lately. Denies any edema denies any chest tightness on exertion but she cannot walk much. Denies any fever chills or cough. Follow-up  Associated symptoms include shortness of breath. Pertinent negatives include no chest pain, no abdominal pain and no headaches. Hypertension  The history is provided by the patient. This is a chronic problem. The problem occurs constantly. Associated symptoms include shortness of breath. Pertinent negatives include no chest pain, no abdominal pain and no headaches. Shortness of Breath  The history is provided by the patient. This is a recurrent problem. The problem occurs intermittently. The problem has been gradually improving. Pertinent negatives include no fever, no headaches, no cough, no sputum production, no hemoptysis, no wheezing, no PND, no orthopnea, no chest pain, no vomiting, no abdominal pain, no rash, no leg swelling and no claudication. The problem's precipitants include exercise. Cholesterol Problem  The history is provided by the patient. This is a chronic problem. The problem occurs constantly. The problem has not changed since onset. Associated symptoms include shortness of breath. Pertinent negatives include no chest pain, no abdominal pain and no headaches. Review of Systems   Constitutional: Negative for chills and fever. HENT: Negative for nosebleeds. Eyes: Negative for blurred vision and double vision. Respiratory: Positive for shortness of breath. Negative for cough, hemoptysis, sputum production and wheezing.     Cardiovascular: Negative for chest pain, palpitations, orthopnea, claudication, leg swelling and PND. Gastrointestinal: Negative for abdominal pain, heartburn, nausea and vomiting. Musculoskeletal: Negative for myalgias. Skin: Negative for rash. Neurological: Negative for dizziness, weakness and headaches. Endo/Heme/Allergies: Does not bruise/bleed easily. Family History   Problem Relation Age of Onset    Heart Disease Mother     OSTEOARTHRITIS Mother     OSTEOARTHRITIS Father     Heart Surgery Neg Hx     Heart Attack Neg Hx        Past Medical History:   Diagnosis Date    Anxiety     Arthritis     GERD (gastroesophageal reflux disease)     Hypertension     Interstitial lung disease ([address]        Past Surgical History:   Procedure Laterality Date    BRONCHOSCOPY  9/10/2019         HX CHOLECYSTECTOMY  2015    HX HYSTERECTOMY      HX OTHER SURGICAL      parotid    HX OTHER SURGICAL      Salivary gland surgery        Social History     Tobacco Use    Smoking status: Never Smoker    Smokeless tobacco: Never Used   Substance Use Topics    Alcohol use: No     Alcohol/week: 0.0 standard drinks       No Known Allergies        Visit Vitals  /78   Pulse 84   Ht 5' 1\" (1.549 m)   Wt 60.3 kg (133 lb)   SpO2 99%   BMI 25.13 kg/m²       Physical Exam  Vitals and nursing note reviewed. Constitutional:       Appearance: She is well-developed. HENT:      Head: Normocephalic and atraumatic. Eyes:      Conjunctiva/sclera: Conjunctivae normal.   Neck:      Thyroid: No thyromegaly. Vascular: No JVD. Trachea: No tracheal deviation. Cardiovascular:      Rate and Rhythm: Normal rate and regular rhythm. Chest Wall: PMI is not displaced. Pulses: Intact distal pulses. No decreased pulses. Heart sounds: Normal heart sounds. No murmur heard. No gallop. No S3 sounds. Pulmonary:      Effort: No respiratory distress. Breath sounds: No wheezing or rales. Chest:      Chest wall: No tenderness. Abdominal:      Palpations: Abdomen is soft. Tenderness: There is no abdominal tenderness. Musculoskeletal:      Cervical back: Neck supple. Skin:     General: Skin is warm. Neurological:      Mental Status: She is alert and oriented to person, place, and time. Ms. Anant Ryan has a reminder for a \"due or due soon\" health maintenance. I have asked that she contact her primary care provider for follow-up on this health maintenance. I have personally reviewed patients ekg done at other facility. 2016  Sinus rhythm with occasional premature ventricular complexes   Inferior infarct , age undetermined   Cannot rule out Anterior infarct , age undetermined   Abnormal ECG   No previous ECGs available   NUCLEAR IMAGIN2016     Findings:    1. Stress images reveal normal Myoview distrubution in all the LV segments in short axis, vertical and horizontal long axis views.    2. Resting images have a normal uptake.    3. Gated images reveal normal wall motion and the ejection fraction is calculated to be 79%.    Conclusion:    1. Normal perfusion scan.    2. Normal wall motion and ejection fraction.    3. Low risk scan. SUMMARY:echo:2016  Procedure information: This was a technically difficult study. Left ventricle: Systolic function was normal. Ejection fraction was  estimated to be 60 %. No obvious wall motion abnormalities identified in  the views obtained. There was mild concentric hypertrophy. Doppler  parameters were consistent with abnormal left ventricular relaxation  (grade 1 diastolic dysfunction). Pericardium: A circumferential pericardial fat pad was present . IMPRESSION 2019  IMPRESSION:     1. Air trapping in the lung bases suggesting presence of interstitial lung  disease. Limited evaluation. Follow-up with CT of the thorax is recommended     The final Radiology portion of this exam was provided by Dr. Edgar Ackeramn on  2019 10:48 AM.     The final Cardiology report will follow.      END RADIOLOGY PORTION OF REPORT CARDIOLOGY REPORT:   7/2019     The patient underwent a limited noncontrast CT scan of her chest with cardiac  gating to evaluate for coronary arterial calcification. Using the Agatston  method the coronary calcium score was calculated. There is a large amount of  coronary calcification present in all 3 major coronary vessels. Coronary calcium  score calculated 393. Procedure Conclusion     Nuclear Stress Test 7/2019    Negative myocardial perfusion imaging. Myocardial perfusion imaging supports a low risk stress test.   There is a prior study available for comparison. As compared to the previous study, there are no significant changes. Interpretation Summary 7/2019       · Left Ventricle: Normal cavity size and systolic function (ejection fraction normal). Mild concentric hypertrophy. Estimated left ventricular ejection fraction is 61 - 65%. No regional wall motion abnormality noted. Mild (grade 1) left ventricular diastolic dysfunction. · Right Ventricle: Normal right ventricular size and function. · No hemodynamically significant valvular pathology. · Pericardium: A circumferential pericardial effusion with a pericardial fat pad was present. Impression: Pulmonary evaluation-8/2019  1. Dyspnea/shortness of breath: Etiology most likely due to ILD given extensive cardiac work-up that is mainly unremarkable. There may be a mild component of deconditioning, but appears to be mainly driven by ILD given groundglass opacities  2. Interstitial lung disease: Based on lung windows of CT heart without contrast, scattered groundglass infiltrates seen through all lung fields. 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 No evidence of subpleural fibrosis or honeycombing.   Differential diagnosis remains extensive-bronchiolitis obliterans, HP, NSIP, etc.       I Have personally reviewed recent relevant labs available and discussed with patient  8/2020  Cbc,bmp,lipids  Results for Latha Contreras (MRN [id number]) as of 4/7/2021 11:15   Ref. Range 3/10/2021 07:14   ALT Latest Ref Range: 13 - 56 U/L 38   AST Latest Ref Range: 10 - 38 U/L 23   Alk. phosphatase Latest Ref Range: 45 - 117 U/L 95   Triglyceride Latest Ref Range: <150 MG/   Cholesterol, total Latest Ref Range: <200 MG/   HDL Cholesterol Latest Ref Range: 40 - 60 MG/DL 57   CHOL/HDL Ratio Latest Ref Range: 0 - 5.0   3.1   VLDL, calculated Latest Units: MG/DL 24.2   LDL, calculated Latest Ref Range: 0 - 100 MG/DL 97.8       Procedure Conclusion    Nuclear Stress Test    Nuclear Cardiac Spect Rest then Gated Stress study. Martina Ko was used as the stressing method and agent. (Martina Ko given via a 10 - 20 sec injection.)   One day myocardial perfusion study. Date: 2/19/2021. Left ventricular perfusion is normal. Myocardial perfusion imaging supports a low risk stress test.   There is a prior study available for comparison. . As compared to the previous study, there are no significant changes     Interpretation Summary    · LV: Estimated LVEF is 60 - 65%. Normal cavity size, systolic function (ejection fraction normal) and diastolic function. Mild concentric hypertrophy. Wall motion: normal.  · RV: Normal right ventricular size and function. · No hemodynamically significant valvular pathology. · Pericardium: Pericardial fat pad present. Assessment         ICD-10-CM ICD-9-CM    1. Coronary artery calcification  I25.10 414.00     I25.84 414.4     Stable asymptomatic continue treatment   2. SOB (shortness of breath)  R06.02 786.05     Stable symptom multifactorial   3. LUCY on CPAP  G47.33 327.23     Z99.89 V46.8     Continue treatment follow-up with sleep physician   4. Hyperlipidemia, unspecified hyperlipidemia type  E78.5 272.4     Continue treatment lab with PCP   5. Essential hypertension  I10 401.9     Stable monitor   6.  Interstitial lung disease ([address]  J84.9 515 Continue monitoring with PCP and pulmonary physician     6/2019  Cardiac symptoms stable. Blood pressure elevated started Toprol 50 mg a day. Follow-up lab with PCP    7/2019 - EKG - ST with right axis deviation - She stopped taking Toprol stating it made he feel bad. Continues to c/o SOB with chest pain on exertion. She also c/o epigastric burning and frequent belching. Will obtain NST, Echo and calcium score to r/o ischemia. Referral to GI for reflux symptoms. Fasting lipids ordered. 7/31/2019  CTA showing moderate calcium score. Also suggested interstitial lung disease. Negative stress test.  Remains short of breath with occasional atypical chest pains. Add Imdur and aspirin. Continue diltiazem and statin. Referred to pulmonary. If no significant pulmonary problem consider invasive cardiac work-up to assess for possible angina equivalent related shortness of breath  9/2019  Short of breath on and off. Pulmonary fibrosis as etiology. Being worked up by Dr. Pastora Nielson. LDL more than 100 I have increase Lipitor  3/2020  Cardiac status stable. Continue current treatment now on CPAP for sleep apnea  9/2020  Cardiac status stable. LDL less than 100. Continue current medical management  2/2021  Seen for increasing shortness of breath worse at night with orthopnea and PND. No clear fluid overload I have added diuretic. Follow-up labs check echo and nuclear stress test  4/20/2021  Shortness of breath multifactorial.  Continue with aspirin statin and diltiazem. Negative nuclear stress test echo with normal EF monitor  10/2021  Cardiac status stable continue medical management. 4/27/2022  Stable cardiac status. Shortness of breath stable continue treatment  Medications Discontinued During This Encounter   Medication Reason    potassium chloride (K-DUR, KLOR-CON) 20 mEq tablet Not A Current Medication       No orders of the defined types were placed in this encounter.     Follow-up and Dispositions · Return in about 6 months (around 10/27/2022). Follow-up and Dispositions    · Return in about 6 months (around 10/27/2022).          Candy Pacheco MD

## 2022-07-01 ENCOUNTER — OFFICE VISIT (OUTPATIENT)
Dept: PULMONOLOGY | Age: 67
End: 2022-07-01
Payer: COMMERCIAL

## 2022-07-01 VITALS
TEMPERATURE: 97.1 F | BODY MASS INDEX: 25.45 KG/M2 | RESPIRATION RATE: 18 BRPM | HEIGHT: 61 IN | DIASTOLIC BLOOD PRESSURE: 75 MMHG | HEART RATE: 85 BPM | SYSTOLIC BLOOD PRESSURE: 125 MMHG | OXYGEN SATURATION: 97 % | WEIGHT: 134.8 LBS

## 2022-07-01 DIAGNOSIS — J31.0 RHINITIS, UNSPECIFIED TYPE: ICD-10-CM

## 2022-07-01 DIAGNOSIS — G47.33 OSA ON CPAP: ICD-10-CM

## 2022-07-01 DIAGNOSIS — T88.7XXA MEDICATION SIDE EFFECT: ICD-10-CM

## 2022-07-01 DIAGNOSIS — J45.30 MILD PERSISTENT ASTHMA WITHOUT COMPLICATION: Primary | ICD-10-CM

## 2022-07-01 DIAGNOSIS — Z99.89 OSA ON CPAP: ICD-10-CM

## 2022-07-01 PROCEDURE — 99214 OFFICE O/P EST MOD 30 MIN: CPT | Performed by: INTERNAL MEDICINE

## 2022-07-01 RX ORDER — FLUTICASONE PROPIONATE 50 MCG
2 SPRAY, SUSPENSION (ML) NASAL DAILY
Qty: 1 EACH | Refills: 5 | Status: SHIPPED | OUTPATIENT
Start: 2022-07-01

## 2022-07-01 RX ORDER — BUDESONIDE AND FORMOTEROL FUMARATE DIHYDRATE 80; 4.5 UG/1; UG/1
1 AEROSOL RESPIRATORY (INHALATION) 2 TIMES DAILY
Qty: 1 EACH | Refills: 5 | Status: SHIPPED | OUTPATIENT
Start: 2022-07-01 | End: 2022-09-22 | Stop reason: SDUPTHER

## 2022-07-01 NOTE — PROGRESS NOTES
HISTORY OF PRESENT ILLNESS  Georges Murphy is a 61 y.o. female following up for dyspnea. Pt was previously seen by Dr. Monico Nageotte for complaints of dyspnea. She was found to have an abnormal CT chest and had a bronchoscopy which was negative. She did have some air trapping on CT and started Albuterol with response. She was then started to Symbicort even better response however this was switched to Advair for formulary reasons. Pt complains of a persistent bitter taste after Advair use. This aggravates her feelings of nausea when using CPAP. Pt also had complaints of insomnia and EDS. Sleep study showed LUCY with AHI of 18.2 and was prescribed autoCPAP. She notes improved symptoms on CPAP but complains of nausea and a dry throat upon awakening. Pt was instructed to call the [facility] number in relation to recall notices she has been getting online. PMH includes pt positive Quantiferron Gold. She completed a course of INH. Review of Systems   Constitutional: Negative for chills, diaphoresis, fever, malaise/fatigue and weight loss. HENT: Positive for congestion and hearing loss (B hearing aids). Negative for ear discharge, ear pain, nosebleeds, sinus pain, sore throat and tinnitus. Eyes: Negative for blurred vision, double vision, photophobia, pain, discharge and redness. Respiratory: Positive for sputum production, shortness of breath and wheezing. Negative for cough, hemoptysis and stridor. Cardiovascular: Negative for chest pain, palpitations, orthopnea, claudication, leg swelling and PND. Gastrointestinal: Negative for abdominal pain, blood in stool, constipation, diarrhea, heartburn, melena, nausea and vomiting. Genitourinary: Negative for dysuria, flank pain, frequency, hematuria and urgency. Musculoskeletal: Negative for back pain, falls, joint pain, myalgias and neck pain. Skin: Negative for itching and rash.    Neurological: Negative for dizziness, tingling, tremors, sensory change, speech change, focal weakness, seizures, loss of consciousness, weakness and headaches. Endo/Heme/Allergies: Negative for environmental allergies and polydipsia. Does not bruise/bleed easily. Psychiatric/Behavioral: Negative for depression, hallucinations, memory loss, substance abuse and suicidal ideas. The patient is nervous/anxious. The patient does not have insomnia. Past Medical History:   Diagnosis Date    Anxiety     Arthritis     GERD (gastroesophageal reflux disease)     Hypertension     Interstitial lung disease ([facility] [address]      Past Surgical History:   Procedure Laterality Date    BRONCHOSCOPY  9/10/2019         HX CHOLECYSTECTOMY  2015    HX HYSTERECTOMY      HX OTHER SURGICAL      parotid    HX OTHER SURGICAL      Salivary gland surgery      Current Outpatient Medications on File Prior to Visit   Medication Sig Dispense Refill    furosemide (LASIX) 40 mg tablet Take 1 Tablet by mouth daily. 90 Tablet 2    ketotifen (Zaditor) 0.025 % (0.035 %) ophthalmic solution Administer 1 Drop to both eyes two (2) times a day.  cpap machine kit by Does Not Apply route. Indications: DME: [facility]      pyridoxine HCl, vitamin B6, (VITAMIN B-6 PO) Take 1 Tablet by mouth daily.  aspirin delayed-release 81 mg tablet Take 1 tablet by mouth once daily 100 Tab 0    cyclobenzaprine (FLEXERIL) 10 mg tablet Take 10 mg by mouth daily as needed.  atorvastatin (LIPITOR) 40 mg tablet Take 1 Tab by mouth daily. (Patient taking differently: Take 40 mg by mouth nightly.) 90 Tab 3    hydroCHLOROthiazide (MICROZIDE) 12.5 mg capsule Take 12.5 mg by mouth nightly. 3    dilTIAZem CD (CARTIA XT) 120 mg ER capsule Take 120 mg by mouth daily.  naproxen (NAPROSYN) 500 mg tablet Take 500 mg by mouth as needed.  omeprazole (PRILOSEC) 20 mg capsule Take 20 mg by mouth daily. No current facility-administered medications on file prior to visit.      No Known Allergies  Family History   Problem Relation Age of Onset    Heart Disease Mother     OSTEOARTHRITIS Mother     OSTEOARTHRITIS Father     Heart Surgery Neg Hx     Heart Attack Neg Hx      Social History     Socioeconomic History    Marital status:      Spouse name: Not on file    Number of children: Not on file    Years of education: Not on file    Highest education level: Not on file   Occupational History    Not on file   Tobacco Use    Smoking status: Never Smoker    Smokeless tobacco: Never Used   Vaping Use    Vaping Use: Never used   Substance and Sexual Activity    Alcohol use: No     Alcohol/week: 0.0 standard drinks    Drug use: No    Sexual activity: Not on file   Other Topics Concern    Not on file   Social History Narrative    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 She has no pets. No exposure to asbestos, feathers, pets     Social Determinants of Health     Financial Resource Strain:     Difficulty of Paying Living Expenses: Not on file   Food Insecurity:     Worried About Running Out of Food in the Last Year: Not on file    Reymundo of Food in the Last Year: Not on file   Transportation Needs:     Lack of Transportation (Medical): Not on file    Lack of Transportation (Non-Medical):  Not on file   Physical Activity:     Days of Exercise per Week: Not on file    Minutes of Exercise per Session: Not on file   Stress:     Feeling of Stress : Not on file   Social Connections:     Frequency of Communication with Friends and Family: Not on file    Frequency of Social Gatherings with Friends and Family: Not on file    Attends Congregation Services: Not on file    Active Member of Clubs or Organizations: Not on file    Attends Club or Organization Meetings: Not on file    Marital Status: Not on file   Intimate Partner Violence:     Fear of Current or Ex-Partner: Not on file    Emotionally Abused: Not on file    Physically Abused: Not on file    Sexually Abused: Not on file   Housing Stability:     Unable to Pay for Housing in the Last Year: Not on file    Number of Places Lived in the Last Year: Not on file    Unstable Housing in the Last Year: Not on file     Blood pressure 125/75, pulse 85, temperature 97.1 °F (36.2 °C), temperature source Temporal, resp. rate 18, height 5' 1\" (1.549 m), weight 61.1 kg (134 lb 12.8 oz), SpO2 97 %. ambulatory oxymetry per nurse note  Physical Exam  Constitutional:       General: She is not in acute distress. Appearance: Normal appearance. She is not ill-appearing, toxic-appearing or diaphoretic. HENT:      Head: Normocephalic and atraumatic. Right Ear: External ear normal.      Left Ear: External ear normal.      Nose:      Comments: Deferred      Mouth/Throat:      Comments: Deferred   Eyes:      General: No scleral icterus. Right eye: No discharge. Left eye: No discharge. Extraocular Movements: Extraocular movements intact. Conjunctiva/sclera: Conjunctivae normal.      Pupils: Pupils are equal, round, and reactive to light. Neck:      Vascular: No carotid bruit. Cardiovascular:      Rate and Rhythm: Normal rate and regular rhythm. Pulses: Normal pulses. Heart sounds: Normal heart sounds. No murmur heard. No friction rub. No gallop. Pulmonary:      Effort: Pulmonary effort is normal. No respiratory distress. Breath sounds: Normal breath sounds. No stridor. No wheezing, rhonchi or rales. Abdominal:      General: There is no distension. Palpations: Abdomen is soft. There is no mass. Tenderness: There is no abdominal tenderness. There is no rebound. Musculoskeletal:         General: No swelling, tenderness, deformity or signs of injury. Cervical back: Neck supple. No rigidity or tenderness. No muscular tenderness. Right lower leg: No edema. Left lower leg: No edema. Lymphadenopathy:      Cervical: No cervical adenopathy. Skin:     General: Skin is warm and dry. Coloration: Skin is not jaundiced or pale. Findings: No bruising, erythema, lesion or rash. Neurological:      General: No focal deficit present. Mental Status: She is alert and oriented to person, place, and time. Motor: No weakness. Coordination: Coordination normal.   Psychiatric:         Mood and Affect: Mood normal.         Behavior: Behavior normal.         Thought Content: Thought content normal.         Judgment: Judgment normal.       CT Results (most recent):  Results from Hospital Encounter encounter on 01/21/20    CT CHEST WO CONT    Narrative  HIGH RESOLUTION CT CHEST    CPT CODE: 29730    COMPARISON: 7/30/2019. INDICATIONS: Mosaic attenuation at the lung bases on prior CT with high  resolution CT recommended. TECHNIQUE: Axial scanning of the chest is performed with high resolution thin  sections in both inspiration and expiration. Additional inspiratory prone images  were performed. All CT scans at this facility are performed using dose optimization technique as  appropriate to a performed exam, to include automated exposure control,  adjustment of the mA and/or kV according to patient size (including appropriate  matching first site-specific examinations), or use of iterative reconstruction  technique. FINDINGS:  Thyroid: Unremarkable in its visualized aspects. Pericardium/ Heart: No significant effusion. Lipomatous hypertrophy of the  pericardium and intra-atrial septum. Severe LAD coronary arteriosclerosis. Aorta/ Vessels: Aorta is normal in caliber. .  Mild atherosclerosis. Lymph Nodes: Unremarkable. .    Lungs: Mild mosaic lung attenuation at the bases. Mild bilateral lower lobe  bronchial wall thickening. No significant reticulation or groundglass. There are  multifocal regions of air trapping on expiration view. The mosaic attenuation  improves with prone imaging. Pleura: No effusion. Upper Abdomen: Cholelithiasis. Bones/soft tissues: Osteopenia. Degenerative disc disease. Impression  IMPRESSION:    1. Mosaic attenuation with multifocal regions of air trapping. Mild bilateral  lower lobe bronchial wall thickening which may represent bronchitis, bronchial  wall edema or reactive airway disease. 2.  Lipomatous hypertrophy of the pericardium and interatrial septum. 3.  Severe LAD coronary arteriosclerosis. 4.  Cholelithiasis. ASSESSMENT and PLAN  Encounter Diagnoses   Name Primary?  Mild persistent asthma without complication Yes    LUCY on CPAP     Rhinitis, unspecified type     Medication side effect      Pt with dyspnea due in large part to air trapping due to asthma, akanksha with response to bronchodilators  As pt is unable to tolerate Advair/Wixela, will Rx Symbicort, hopefully formulary will include this. If not will try Breo. Start Flonase spray, pt instructed on technique. Healthy weight discussion. Pt to call [facility] re: recall notification and let us know what action is needed  RTC 6 months  Gaylyn Bloch on next visit.

## 2022-07-01 NOTE — PROGRESS NOTES
Lorena Durand presents today for   Chief Complaint   Patient presents with    Follow-up       Is someone accompanying this pt? yes    Is the patient using any DME equipment during OV? CPAP machine    -[employer]    Depression Screening:  3 most recent PHQ Screens 7/1/2022   Little interest or pleasure in doing things Not at all   Feeling down, depressed, irritable, or hopeless Not at all   Total Score PHQ 2 0       Learning Assessment:  Learning Assessment 8/15/2019   PRIMARY LEARNER Patient   PRIMARY LANGUAGE ENGLISH   LEARNER PREFERENCE PRIMARY LISTENING   ANSWERED BY patient   RELATIONSHIP SELF       Abuse Screening:  Abuse Screening Questionnaire 2/15/2022   Do you ever feel afraid of your partner? N   Are you in a relationship with someone who physically or mentally threatens you? N   Is it safe for you to go home? Y       Fall Risk  No flowsheet data found. Coordination of Care:  1. Have you been to the ER, urgent care clinic since your last visit? Hospitalized since your last visit? no    2. Have you seen or consulted any other health care providers outside of the [address] [place] [address] since your last visit? Include any pap smears or colon screening.  no

## 2022-08-03 ENCOUNTER — OFFICE VISIT (OUTPATIENT)
Dept: CARDIOLOGY CLINIC | Age: 67
End: 2022-08-03
Payer: COMMERCIAL

## 2022-08-03 VITALS
DIASTOLIC BLOOD PRESSURE: 71 MMHG | HEART RATE: 87 BPM | SYSTOLIC BLOOD PRESSURE: 109 MMHG | BODY MASS INDEX: 25.68 KG/M2 | OXYGEN SATURATION: 97 % | WEIGHT: 136 LBS | HEIGHT: 61 IN

## 2022-08-03 DIAGNOSIS — I25.84 CORONARY ARTERY CALCIFICATION: Primary | ICD-10-CM

## 2022-08-03 DIAGNOSIS — R06.02 SOB (SHORTNESS OF BREATH): ICD-10-CM

## 2022-08-03 DIAGNOSIS — J84.9 INTERSTITIAL LUNG DISEASE (HCC): ICD-10-CM

## 2022-08-03 DIAGNOSIS — I25.10 CORONARY ARTERY CALCIFICATION: Primary | ICD-10-CM

## 2022-08-03 DIAGNOSIS — I10 ESSENTIAL HYPERTENSION: ICD-10-CM

## 2022-08-03 DIAGNOSIS — E78.5 HYPERLIPIDEMIA, UNSPECIFIED HYPERLIPIDEMIA TYPE: ICD-10-CM

## 2022-08-03 PROCEDURE — 93000 ELECTROCARDIOGRAM COMPLETE: CPT | Performed by: NURSE PRACTITIONER

## 2022-08-03 PROCEDURE — 99214 OFFICE O/P EST MOD 30 MIN: CPT | Performed by: NURSE PRACTITIONER

## 2022-08-03 NOTE — PROGRESS NOTES
1. Have you been to the ER, urgent care clinic since your last visit? Hospitalized since your last visit?     no    2. Have you seen or consulted any other health care providers outside of the [address] [place] [address] since your last visit? Include any pap smears or colon screening.       No

## 2022-08-03 NOTE — PROGRESS NOTES
HISTORY OF PRESENT ILLNESS  Karyle Gill is a 61 y.o. female. 2/2021  Patient is here for follow-up earlier than her usual visit. She has been having episodes of sudden shortness of breath and tightness this happens at nighttime. She is usually on CPAP she is to get off CPAP and go outside for air. This is happened frequently lately. Denies any edema denies any chest tightness on exertion but she cannot walk much. Denies any fever chills or cough. 8/2022  Patient seen in follow - up. She reports has been moving this week and c/o cough with shortness of breath. She reports is not longer on CPAP or inhaler (unsure of name) because it makes her cough and vomit. She has been off of both x 2 weeks. She denies chest pain, palpitations or edema. Follow-up  The history is provided by the Patient and medical records. Associated symptoms include shortness of breath. Pertinent negatives include no chest pain, no abdominal pain and no headaches. Hypertension  The history is provided by the Patient. This is a chronic problem. The problem occurs constantly. Associated symptoms include shortness of breath. Pertinent negatives include no chest pain, no abdominal pain and no headaches. Shortness of Breath  The history is provided by the Patient. This is a recurrent problem. The problem occurs intermittently. The problem has been gradually improving. Associated symptoms include cough. Pertinent negatives include no fever, no headaches, no sputum production, no hemoptysis, no wheezing, no PND, no orthopnea, no chest pain, no vomiting, no abdominal pain, no rash, no leg swelling and no claudication. The problem's precipitants include exercise. Cholesterol Problem  The history is provided by the Patient. This is a chronic problem. The problem occurs constantly. The problem has not changed since onset. Associated symptoms include shortness of breath.  Pertinent negatives include no chest pain, no abdominal pain and no headaches. Review of Systems   Constitutional:  Negative for chills and fever. HENT:  Negative for nosebleeds. Eyes:  Negative for blurred vision and double vision. Respiratory:  Positive for cough and shortness of breath. Negative for hemoptysis, sputum production and wheezing. Cardiovascular:  Negative for chest pain, palpitations, orthopnea, claudication, leg swelling and PND. Gastrointestinal:  Negative for abdominal pain, heartburn, nausea and vomiting. Musculoskeletal:  Negative for myalgias. Skin:  Negative for rash. Neurological:  Negative for dizziness, weakness and headaches. Endo/Heme/Allergies:  Does not bruise/bleed easily. Family History   Problem Relation Age of Onset    Heart Disease Mother     OSTEOARTHRITIS Mother     OSTEOARTHRITIS Father     Heart Surgery Neg Hx     Heart Attack Neg Hx        Past Medical History:   Diagnosis Date    Anxiety     Arthritis     GERD (gastroesophageal reflux disease)     Hypertension     Interstitial lung disease ([address]        Past Surgical History:   Procedure Laterality Date    BRONCHOSCOPY  9/10/2019         HX CHOLECYSTECTOMY  2015    HX HYSTERECTOMY      HX OTHER SURGICAL      parotid    HX OTHER SURGICAL      Salivary gland surgery        Social History     Tobacco Use    Smoking status: Never    Smokeless tobacco: Never   Substance Use Topics    Alcohol use: No     Alcohol/week: 0.0 standard drinks       No Known Allergies        Visit Vitals  /71   Pulse 87   Ht 5' 1\" (1.549 m)   Wt 61.7 kg (136 lb)   SpO2 97%   BMI 25.70 kg/m²       Physical Exam  Vitals and nursing note reviewed. Constitutional:       Appearance: Normal appearance. She is well-developed. HENT:      Head: Normocephalic and atraumatic. Eyes:      Conjunctiva/sclera: Conjunctivae normal.   Neck:      Thyroid: No thyromegaly. Vascular: No JVD. Trachea: No tracheal deviation.    Cardiovascular:      Rate and Rhythm: Normal rate and regular rhythm. Chest Wall: PMI is not displaced. Pulses: Intact distal pulses. No decreased pulses. Heart sounds: Normal heart sounds. No murmur heard. No gallop. No S3 sounds. Pulmonary:      Effort: No respiratory distress. Breath sounds: No wheezing or rales. Chest:      Chest wall: No tenderness. Abdominal:      Palpations: Abdomen is soft. Tenderness: There is no abdominal tenderness. Musculoskeletal:      Cervical back: Neck supple. Right lower leg: No edema. Left lower leg: No edema. Skin:     General: Skin is warm. Neurological:      Mental Status: She is alert and oriented to person, place, and time. Ms. Mariaelena Chavez has a reminder for a \"due or due soon\" health maintenance. I have asked that she contact her primary care provider for follow-up on this health maintenance. I have personally reviewed patients ekg done at other facility. 2016  Sinus rhythm with occasional premature ventricular complexes   Inferior infarct , age undetermined   Cannot rule out Anterior infarct , age undetermined   Abnormal ECG   No previous ECGs available   NUCLEAR IMAGIN2016     Findings:    1. Stress images reveal normal Myoview distrubution in all the LV segments in short axis, vertical and horizontal long axis views. 2. Resting images have a normal uptake. 3. Gated images reveal normal wall motion and the ejection fraction is calculated to be 79%. Conclusion:    1. Normal perfusion scan. 2. Normal wall motion and ejection fraction. 3. Low risk scan. SUMMARY:echo:2016  Procedure information: This was a technically difficult study. Left ventricle: Systolic function was normal. Ejection fraction was  estimated to be 60 %. No obvious wall motion abnormalities identified in  the views obtained. There was mild concentric hypertrophy.  Doppler  parameters were consistent with abnormal left ventricular relaxation  (grade 1 diastolic dysfunction). Pericardium: A circumferential pericardial fat pad was present . IMPRESSION 7/2019  IMPRESSION:     1. Air trapping in the lung bases suggesting presence of interstitial lung  disease. Limited evaluation. Follow-up with CT of the thorax is recommended     The final Radiology portion of this exam was provided by Dr. Lissette Mackey on  7/30/2019 10:48 AM.     The final Cardiology report will follow. END RADIOLOGY PORTION OF REPORT     CARDIOLOGY REPORT:   7/2019     The patient underwent a limited noncontrast CT scan of her chest with cardiac  gating to evaluate for coronary arterial calcification. Using the Agatston  method the coronary calcium score was calculated. There is a large amount of  coronary calcification present in all 3 major coronary vessels. Coronary calcium  score calculated 393. Procedure Conclusion     Nuclear Stress Test 7/2019    Negative myocardial perfusion imaging. Myocardial perfusion imaging supports a low risk stress test.   There is a prior study available for comparison. As compared to the previous study, there are no significant changes. Interpretation Summary 7/2019       Left Ventricle: Normal cavity size and systolic function (ejection fraction normal). Mild concentric hypertrophy. Estimated left ventricular ejection fraction is 61 - 65%. No regional wall motion abnormality noted. Mild (grade 1) left ventricular diastolic dysfunction. Right Ventricle: Normal right ventricular size and function. No hemodynamically significant valvular pathology. Pericardium: A circumferential pericardial effusion with a pericardial fat pad was present. Impression: Pulmonary evaluation-8/2019  1. Dyspnea/shortness of breath: Etiology most likely due to ILD given extensive cardiac work-up that is mainly unremarkable. There may be a mild component of deconditioning, but appears to be mainly driven by ILD given groundglass opacities  2.   Interstitial lung disease: Based on lung windows of CT heart without contrast, scattered groundglass infiltrates seen through all lung fields. 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 No evidence of subpleural fibrosis or honeycombing. Differential diagnosis remains extensive-bronchiolitis obliterans, HP, NSIP, etc.       I Have personally reviewed recent relevant labs available and discussed with patient  8/2020  Cbc,bmp,lipids  Results for Breana Ricardo (MRN [id number]) as of 4/7/2021 11:15   Ref. Range 3/10/2021 07:14   ALT Latest Ref Range: 13 - 56 U/L 38   AST Latest Ref Range: 10 - 38 U/L 23   Alk. phosphatase Latest Ref Range: 45 - 117 U/L 95   Triglyceride Latest Ref Range: <150 MG/   Cholesterol, total Latest Ref Range: <200 MG/   HDL Cholesterol Latest Ref Range: 40 - 60 MG/DL 57   CHOL/HDL Ratio Latest Ref Range: 0 - 5.0   3.1   VLDL, calculated Latest Units: MG/DL 24.2   LDL, calculated Latest Ref Range: 0 - 100 MG/DL 97.8       Procedure Conclusion    Nuclear Stress Test    Nuclear Cardiac Spect Rest then Gated Stress study. Susan Maxim was used as the stressing method and agent. (Susan Maxim given via a 10 - 20 sec injection.)   One day myocardial perfusion study. Date: 2/19/2021. Left ventricular perfusion is normal. Myocardial perfusion imaging supports a low risk stress test.   There is a prior study available for comparison. . As compared to the previous study, there are no significant changes     Interpretation Summary    LV: Estimated LVEF is 60 - 65%. Normal cavity size, systolic function (ejection fraction normal) and diastolic function. Mild concentric hypertrophy. Wall motion: normal.  RV: Normal right ventricular size and function. No hemodynamically significant valvular pathology. Pericardium: Pericardial fat pad present. Assessment         ICD-10-CM ICD-9-CM    1.  Coronary artery calcification  I25.10 414.00 AMB POC EKG ROUTINE W/ 12 LEADS, INTER & REP    I25.84 414.4     Conitnue aspirin and statin. 2. SOB (shortness of breath)  R06.02 786.05 AMB POC EKG ROUTINE W/ 12 LEADS, INTER & REP    LIk[facility] pulmonary - advised to resume CPAP if able and f/u with pulmonary. 3. Interstitial lung disease (HCC)  J84.9 515     Resume inhalers, CPAP and f/u with Pulmonology      4. Essential hypertension  I10 401.9     B/P  controlled, continue current medications      5. Hyperlipidemia, unspecified hyperlipidemia type  E78.5 272.4     Continue statin, lab with PCP        6/2019  Cardiac symptoms stable. Blood pressure elevated started Toprol 50 mg a day. Follow-up lab with PCP    7/2019 - EKG - ST with right axis deviation - She stopped taking Toprol stating it made he feel bad. Continues to c/o SOB with chest pain on exertion. She also c/o epigastric burning and frequent belching. Will obtain NST, Echo and calcium score to r/o ischemia. Referral to GI for reflux symptoms. Fasting lipids ordered. 7/31/2019  CTA showing moderate calcium score. Also suggested interstitial lung disease. Negative stress test.  Remains short of breath with occasional atypical chest pains. Add Imdur and aspirin. Continue diltiazem and statin. Referred to pulmonary. If no significant pulmonary problem consider invasive cardiac work-up to assess for possible angina equivalent related shortness of breath  9/2019  Short of breath on and off. Pulmonary fibrosis as etiology. Being worked up by Dr. Bethann Phalen. LDL more than 100 I have increase Lipitor  3/2020  Cardiac status stable. Continue current treatment now on CPAP for sleep apnea  9/2020  Cardiac status stable. LDL less than 100. Continue current medical management  2/2021  Seen for increasing shortness of breath worse at night with orthopnea and PND. No clear fluid overload I have added diuretic.   Follow-up labs check echo and nuclear stress test  4/20/2021  Shortness of breath multifactorial.  Continue with aspirin statin and diltiazem. Negative nuclear stress test echo with normal EF monitor  10/2021  Cardiac status stable continue medical management. 4/27/2022  Stable cardiac status. Shortness of breath stable continue treatment  8/2022  Seen for c/o shortness of breath with cough. Denies chest pain, edema or palpitations. She stopped using inhaler ( not sure of name) and CPAP > 2 weeks ago due to causing nausea and vomiting. Prior cardiac testing reviewed and discussed with patient, Stress test 3/2021 negative for ischemia, Echo normal EF, no significant valvular pathology. In office EKG SR with right axis deviation, unchanged from prior. Dyspnea likely pulmonary in etiology. Advise to follow up with pulmonary and need to resume CPAP if able. There are no discontinued medications. Orders Placed This Encounter    AMB POC EKG ROUTINE W/ 12 LEADS, INTER & REP     Order Specific Question:   Reason for Exam:     Answer:   shortness of breath     Follow-up and Dispositions    Return for Follow up with Dr. Houston Anderson as previously scheduled. .       Follow-up and Dispositions    Return for Follow up with Dr. Houston Anderson as previously scheduled. Shyam Fong, DEANNE

## 2022-08-11 ENCOUNTER — OFFICE VISIT (OUTPATIENT)
Dept: PULMONOLOGY | Age: 67
End: 2022-08-11
Payer: COMMERCIAL

## 2022-08-11 VITALS
RESPIRATION RATE: 18 BRPM | OXYGEN SATURATION: 98 % | HEART RATE: 68 BPM | TEMPERATURE: 97.3 F | WEIGHT: 135.4 LBS | SYSTOLIC BLOOD PRESSURE: 125 MMHG | DIASTOLIC BLOOD PRESSURE: 76 MMHG | HEIGHT: 61 IN | BODY MASS INDEX: 25.57 KG/M2

## 2022-08-11 DIAGNOSIS — J30.9 ALLERGIC RHINITIS, UNSPECIFIED SEASONALITY, UNSPECIFIED TRIGGER: ICD-10-CM

## 2022-08-11 DIAGNOSIS — J45.30 MILD PERSISTENT ASTHMA WITHOUT COMPLICATION: Primary | ICD-10-CM

## 2022-08-11 DIAGNOSIS — Z78.9 INTOLERANCE OF CONTINUOUS POSITIVE AIRWAY PRESSURE (CPAP) VENTILATION: ICD-10-CM

## 2022-08-11 DIAGNOSIS — G47.33 OSA (OBSTRUCTIVE SLEEP APNEA): ICD-10-CM

## 2022-08-11 PROCEDURE — 99214 OFFICE O/P EST MOD 30 MIN: CPT | Performed by: INTERNAL MEDICINE

## 2022-08-11 RX ORDER — ALBUTEROL SULFATE 90 UG/1
2 AEROSOL, METERED RESPIRATORY (INHALATION)
Qty: 18 G | Refills: 5 | Status: SHIPPED | OUTPATIENT
Start: 2022-08-11 | End: 2022-10-26

## 2022-08-11 NOTE — PROGRESS NOTES
[place] presents today for   Chief Complaint   Patient presents with    Follow-up     CPAP machine her sick       Is someone accompanying this pt? Yes     Is the patient using any DME equipment during OV? CPAP machine (not using right makes her sick  at night )   -DME Company n/a    Depression Screening:  3 most recent PHQ Screens 8/11/2022   Little interest or pleasure in doing things Not at all   Feeling down, depressed, irritable, or hopeless Not at all   Total Score PHQ 2 0       Learning Assessment:  Learning Assessment 8/15/2019   PRIMARY LEARNER Patient   PRIMARY LANGUAGE ENGLISH   LEARNER PREFERENCE PRIMARY LISTENING   ANSWERED BY patient   RELATIONSHIP SELF       Abuse Screening:  Abuse Screening Questionnaire 2/15/2022   Do you ever feel afraid of your partner? N   Are you in a relationship with someone who physically or mentally threatens you? N   Is it safe for you to go home? Y       Fall Risk  No flowsheet data found. Coordination of Care:  1. Have you been to the ER, urgent care clinic since your last visit? Hospitalized since your last visit? No    2. Have you seen or consulted any other health care providers outside of the [address] [place] [address] since your last visit? Include any pap smears or colon screening.  no

## 2022-08-11 NOTE — PROGRESS NOTES
HISTORY OF PRESENT ILLNESS  Georges Murphy is a 61 y.o. female  Pt with initial complaint of dyspnea and was found to have an abnormal CT chest and had a bronchoscopy which was negative. She had some air trapping on CT and was started Albuterol with response. She was then started to Symbicort even better response and for some reason Albuterol fell off the list. Lately she has noted a dry cough after CPAP use but also throughout the day, which then triggers retching and nausea when using CPAP. Pt admits to reduced CPAP use and now complains of daytime somnolence and insomnia. She also notes worsening memory loss. Complaint of dry throat on awakening persist.   Pt is accompanied by her friend who provides much of the history due to hearing loss. PMH includes pt positive Quantiferron Gold. She completed a course of INH. Review of Systems   Constitutional:  Positive for weight loss. Negative for chills, diaphoresis, fever and malaise/fatigue. HENT:  Positive for congestion and hearing loss (B hearing aids). Negative for ear discharge, ear pain, nosebleeds, sinus pain, sore throat and tinnitus. Eyes:  Negative for blurred vision, double vision, photophobia, pain, discharge and redness. Respiratory:  Positive for sputum production, shortness of breath and wheezing. Negative for cough, hemoptysis and stridor. Cardiovascular:  Negative for chest pain, palpitations, orthopnea, claudication, leg swelling and PND. Gastrointestinal:  Negative for abdominal pain, blood in stool, constipation, diarrhea, heartburn, melena, nausea and vomiting. Genitourinary:  Negative for dysuria, flank pain, frequency, hematuria and urgency. Musculoskeletal:  Negative for back pain, falls, joint pain, myalgias and neck pain. Skin:  Negative for itching and rash.    Neurological:  Negative for dizziness, tingling, tremors, sensory change, speech change, focal weakness, seizures, loss of consciousness, weakness and headaches. Endo/Heme/Allergies:  Negative for environmental allergies and polydipsia. Does not bruise/bleed easily. Psychiatric/Behavioral:  Negative for depression, hallucinations, memory loss, substance abuse and suicidal ideas. The patient is nervous/anxious. The patient does not have insomnia. Past Medical History:   Diagnosis Date    Anxiety     Arthritis     GERD (gastroesophageal reflux disease)     Hypertension     Interstitial lung disease ([facility] [address]      Past Surgical History:   Procedure Laterality Date    BRONCHOSCOPY  9/10/2019         HX CHOLECYSTECTOMY  2015    HX HYSTERECTOMY      HX OTHER SURGICAL      parotid    HX OTHER SURGICAL      Salivary gland surgery      Current Outpatient Medications on File Prior to Visit   Medication Sig Dispense Refill    budesonide-formoteroL (Symbicort) 80-4.5 mcg/actuation HFAA Take 1 Puff by inhalation two (2) times a day. 1 Each 5    fluticasone propionate (FLONASE) 50 mcg/actuation nasal spray 2 Sprays by Both Nostrils route daily. 1 Each 5    furosemide (LASIX) 40 mg tablet Take 1 Tablet by mouth daily. 90 Tablet 2    ketotifen (ZADITOR) 0.025 % (0.035 %) ophthalmic solution Administer 1 Drop to both eyes two (2) times a day.  cpap machine kit by Does Not Apply route. Indications: DME: [facility]      pyridoxine HCl, vitamin B6, (VITAMIN B-6 PO) Take 1 Tablet by mouth daily.  aspirin delayed-release 81 mg tablet Take 1 tablet by mouth once daily 100 Tab 0    cyclobenzaprine (FLEXERIL) 10 mg tablet Take 10 mg by mouth daily as needed.  atorvastatin (LIPITOR) 40 mg tablet Take 1 Tab by mouth daily. (Patient taking differently: Take 40 mg by mouth nightly.) 90 Tab 3    hydroCHLOROthiazide (MICROZIDE) 12.5 mg capsule Take 12.5 mg by mouth nightly. 3    dilTIAZem CD (CARTIA XT) 120 mg ER capsule Take 120 mg by mouth daily.  naproxen (NAPROSYN) 500 mg tablet Take 500 mg by mouth as needed.       omeprazole (PRILOSEC) 20 mg capsule Take 20 mg by mouth daily. No current facility-administered medications on file prior to visit. No Known Allergies  Family History   Problem Relation Age of Onset    Heart Disease Mother     OSTEOARTHRITIS Mother     OSTEOARTHRITIS Father     Heart Surgery Neg Hx     Heart Attack Neg Hx      Social History     Socioeconomic History    Marital status:      Spouse name: Not on file    Number of children: Not on file    Years of education: Not on file    Highest education level: Not on file   Occupational History    Not on file   Tobacco Use    Smoking status: Never    Smokeless tobacco: Never   Vaping Use    Vaping Use: Never used   Substance and Sexual Activity    Alcohol use: No     Alcohol/week: 0.0 standard drinks    Drug use: No    Sexual activity: Not on file   Other Topics Concern    Not on file   Social History Narrative    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 She has no pets. No exposure to asbestos, feathers, pets     Social Determinants of Health     Financial Resource Strain: Not on file   Food Insecurity: Not on file   Transportation Needs: Not on file   Physical Activity: Not on file   Stress: Not on file   Social Connections: Not on file   Intimate Partner Violence: Not on file   Housing Stability: Not on file     Blood pressure 125/76, pulse 68, temperature 97.3 °F (36.3 °C), temperature source Temporal, resp. rate 18, height 5' 1\" (1.549 m), weight 61.4 kg (135 lb 6.4 oz), SpO2 98 %. ambulatory oxymetry per nurse note  Physical Exam  Constitutional:       General: She is not in acute distress. Appearance: Normal appearance. She is not ill-appearing, toxic-appearing or diaphoretic. HENT:      Head: Normocephalic and atraumatic. Right Ear: External ear normal.      Left Ear: External ear normal.      Nose:      Comments: Deferred      Mouth/Throat:      Comments: Deferred   Eyes:      General: No scleral icterus.         Right eye: No discharge. Left eye: No discharge. Extraocular Movements: Extraocular movements intact. Conjunctiva/sclera: Conjunctivae normal.      Pupils: Pupils are equal, round, and reactive to light. Neck:      Vascular: No carotid bruit. Cardiovascular:      Rate and Rhythm: Normal rate and regular rhythm. Pulses: Normal pulses. Heart sounds: Normal heart sounds. No murmur heard. No friction rub. No gallop. Pulmonary:      Effort: Pulmonary effort is normal. No respiratory distress. Breath sounds: Normal breath sounds. No stridor. No wheezing, rhonchi or rales. Abdominal:      General: There is no distension. Palpations: Abdomen is soft. There is no mass. Tenderness: There is no abdominal tenderness. There is no rebound. Musculoskeletal:         General: No swelling, tenderness, deformity or signs of injury. Cervical back: Neck supple. No rigidity or tenderness. No muscular tenderness. Right lower leg: No edema. Left lower leg: No edema. Lymphadenopathy:      Cervical: No cervical adenopathy. Skin:     General: Skin is warm and dry. Coloration: Skin is not jaundiced or pale. Findings: No bruising, erythema, lesion or rash. Neurological:      General: No focal deficit present. Mental Status: She is alert and oriented to person, place, and time. Motor: No weakness. Coordination: Coordination normal.   Psychiatric:         Mood and Affect: Mood normal.         Behavior: Behavior normal.         Thought Content: Thought content normal.         Judgment: Judgment normal.     CT Results (most recent):  Results from Hospital Encounter encounter on 01/21/20    CT CHEST WO CONT    Narrative  HIGH RESOLUTION CT CHEST    CPT CODE: 47430    COMPARISON: 7/30/2019. INDICATIONS: Mosaic attenuation at the lung bases on prior CT with high  resolution CT recommended.     TECHNIQUE: Axial scanning of the chest is performed with high resolution thin  sections in both inspiration and expiration. Additional inspiratory prone images  were performed. All CT scans at this facility are performed using dose optimization technique as  appropriate to a performed exam, to include automated exposure control,  adjustment of the mA and/or kV according to patient size (including appropriate  matching first site-specific examinations), or use of iterative reconstruction  technique. FINDINGS:  Thyroid: Unremarkable in its visualized aspects. Pericardium/ Heart: No significant effusion. Lipomatous hypertrophy of the  pericardium and intra-atrial septum. Severe LAD coronary arteriosclerosis. Aorta/ Vessels: Aorta is normal in caliber. .  Mild atherosclerosis. Lymph Nodes: Unremarkable. .    Lungs: Mild mosaic lung attenuation at the bases. Mild bilateral lower lobe  bronchial wall thickening. No significant reticulation or groundglass. There are  multifocal regions of air trapping on expiration view. The mosaic attenuation  improves with prone imaging. Pleura: No effusion. Upper Abdomen: Cholelithiasis. Bones/soft tissues: Osteopenia. Degenerative disc disease. Impression  IMPRESSION:    1. Mosaic attenuation with multifocal regions of air trapping. Mild bilateral  lower lobe bronchial wall thickening which may represent bronchitis, bronchial  wall edema or reactive airway disease. 2.  Lipomatous hypertrophy of the pericardium and interatrial septum. 3.  Severe LAD coronary arteriosclerosis. 4.  Cholelithiasis. ASSESSMENT and PLAN  Encounter Diagnoses   Name Primary?  Mild persistent asthma without complication Yes    LUCY (obstructive sleep apnea)     Intolerance of continuous positive airway pressure (CPAP) ventilation     Allergic rhinitis, unspecified seasonality, unspecified trigger      Pt with apparent intolerance to APAP, unclear whether this is a result or cause of weight change as pt believes.    Increased cough also plays a part and may be due to worsening asthma control. Of note, pt is no longer on rescue Albuterol which I have resumed today. Will schedule split night study to assess PAP needs. If she remains intolerant of PAP therapy, MAD is probably not a good alternative given her history of TMJ disease. Will also check IgE and diff count to R/o an allergic/eosinophilic component to her asthma. RTC 3 months. Healthy weight discussion. Pt to call [facility]: recall notification and let us know what action is needed  RTC 6 months  Sindy Ontiveros on next visit.

## 2022-08-15 DIAGNOSIS — J45.40 MODERATE PERSISTENT ASTHMA WITHOUT COMPLICATION: Primary | ICD-10-CM

## 2022-08-17 ENCOUNTER — TRANSCRIBE ORDER (OUTPATIENT)
Dept: REGISTRATION | Age: 67
End: 2022-08-17

## 2022-08-17 ENCOUNTER — HOSPITAL ENCOUNTER (OUTPATIENT)
Dept: LAB | Age: 67
Discharge: HOME | End: 2022-08-17

## 2022-08-17 ENCOUNTER — TELEPHONE (OUTPATIENT)
Dept: SLEEP MEDICINE | Age: 67
End: 2022-08-17

## 2022-08-17 DIAGNOSIS — J45.40 MODERATE PERSISTENT ASTHMA: Primary | ICD-10-CM

## 2022-08-17 DIAGNOSIS — J84.9 INTERSTITIAL LUNG DISEASE (HCC): ICD-10-CM

## 2022-08-17 DIAGNOSIS — Z99.89 OSA ON CPAP: Primary | ICD-10-CM

## 2022-08-17 DIAGNOSIS — G47.33 OSA ON CPAP: Primary | ICD-10-CM

## 2022-08-17 LAB — XX-LABCORP SPECIMEN COL,LCBCF: NORMAL

## 2022-08-17 PROCEDURE — 99001 SPECIMEN HANDLING PT-LAB: CPT

## 2022-08-18 ENCOUNTER — TELEPHONE (OUTPATIENT)
Dept: SLEEP MEDICINE | Age: 67
End: 2022-08-18

## 2022-08-21 LAB
BASOPHILS # BLD AUTO: 0.1 X10E3/UL (ref 0–0.2)
BASOPHILS NFR BLD AUTO: 1 %
EOSINOPHIL # BLD AUTO: 0.3 X10E3/UL (ref 0–0.4)
EOSINOPHIL NFR BLD AUTO: 4 %
ERYTHROCYTE [DISTWIDTH] IN BLOOD BY AUTOMATED COUNT: 12.8 % (ref 11.7–15.4)
HCT VFR BLD AUTO: 39 % (ref 34–46.6)
HGB BLD-MCNC: 13 G/DL (ref 11.1–15.9)
IGE SERPL-ACNC: 33 IU/ML (ref 6–495)
IMM GRANULOCYTES # BLD AUTO: 0 X10E3/UL (ref 0–0.1)
IMM GRANULOCYTES NFR BLD AUTO: 0 %
LYMPHOCYTES # BLD AUTO: 2.7 X10E3/UL (ref 0.7–3.1)
LYMPHOCYTES NFR BLD AUTO: 36 %
MCH RBC QN AUTO: 29.5 PG (ref 26.6–33)
MCHC RBC AUTO-ENTMCNC: 33.3 G/DL (ref 31.5–35.7)
MCV RBC AUTO: 88 FL (ref 79–97)
MONOCYTES # BLD AUTO: 0.5 X10E3/UL (ref 0.1–0.9)
MONOCYTES NFR BLD AUTO: 6 %
NEUTROPHILS # BLD AUTO: 4 X10E3/UL (ref 1.4–7)
NEUTROPHILS NFR BLD AUTO: 53 %
PLATELET # BLD AUTO: 355 X10E3/UL (ref 150–450)
RBC # BLD AUTO: 4.41 X10E6/UL (ref 3.77–5.28)
WBC # BLD AUTO: 7.6 X10E3/UL (ref 3.4–10.8)

## 2022-08-23 ENCOUNTER — APPOINTMENT (OUTPATIENT)
Dept: VASCULAR SURGERY | Age: 67
End: 2022-08-23
Attending: PHYSICIAN ASSISTANT
Payer: COMMERCIAL

## 2022-08-23 ENCOUNTER — APPOINTMENT (OUTPATIENT)
Dept: GENERAL RADIOLOGY | Age: 67
End: 2022-08-23
Attending: PHYSICIAN ASSISTANT
Payer: COMMERCIAL

## 2022-08-23 ENCOUNTER — HOSPITAL ENCOUNTER (EMERGENCY)
Age: 67
Discharge: HOME | End: 2022-08-23
Attending: STUDENT IN AN ORGANIZED HEALTH CARE EDUCATION/TRAINING PROGRAM
Payer: COMMERCIAL

## 2022-08-23 VITALS
TEMPERATURE: 98.2 F | OXYGEN SATURATION: 99 % | SYSTOLIC BLOOD PRESSURE: 111 MMHG | RESPIRATION RATE: 20 BRPM | DIASTOLIC BLOOD PRESSURE: 77 MMHG | HEART RATE: 62 BPM

## 2022-08-23 DIAGNOSIS — E87.6 HYPOKALEMIA: ICD-10-CM

## 2022-08-23 DIAGNOSIS — R06.00 DYSPNEA, UNSPECIFIED TYPE: Primary | ICD-10-CM

## 2022-08-23 DIAGNOSIS — M25.559 HIP PAIN: ICD-10-CM

## 2022-08-23 LAB
ALBUMIN SERPL-MCNC: 3.9 G/DL (ref 3.4–5)
ALBUMIN/GLOB SERPL: 1.1 {RATIO} (ref 0.8–1.7)
ALP SERPL-CCNC: 77 U/L (ref 45–117)
ALT SERPL-CCNC: 31 U/L (ref 13–56)
ANION GAP SERPL CALC-SCNC: 9 MMOL/L (ref 3–18)
AST SERPL-CCNC: 26 U/L (ref 10–38)
ATRIAL RATE: 59 BPM
ATRIAL RATE: 94 BPM
BASOPHILS # BLD: 0.1 K/UL (ref 0–0.1)
BASOPHILS NFR BLD: 1 % (ref 0–2)
BILIRUB SERPL-MCNC: 0.7 MG/DL (ref 0.2–1)
BNP SERPL-MCNC: 42 PG/ML (ref 0–900)
BUN SERPL-MCNC: 18 MG/DL (ref 7–18)
BUN/CREAT SERPL: 24 (ref 12–20)
CALCIUM SERPL-MCNC: 9.9 MG/DL (ref 8.5–10.1)
CALCULATED P AXIS, ECG09: 45 DEGREES
CALCULATED P AXIS, ECG09: 63 DEGREES
CALCULATED R AXIS, ECG10: -9 DEGREES
CALCULATED R AXIS, ECG10: 92 DEGREES
CALCULATED T AXIS, ECG11: 26 DEGREES
CALCULATED T AXIS, ECG11: 46 DEGREES
CHLORIDE SERPL-SCNC: 104 MMOL/L (ref 100–111)
CO2 SERPL-SCNC: 27 MMOL/L (ref 21–32)
COVID-19 RAPID TEST, COVR: NOT DETECTED
CREAT SERPL-MCNC: 0.75 MG/DL (ref 0.6–1.3)
D DIMER PPP FEU-MCNC: 0.3 UG/ML(FEU)
DIAGNOSIS, 93000: NORMAL
DIAGNOSIS, 93000: NORMAL
DIFFERENTIAL METHOD BLD: NORMAL
EOSINOPHIL # BLD: 0.2 K/UL (ref 0–0.4)
EOSINOPHIL NFR BLD: 2 % (ref 0–5)
ERYTHROCYTE [DISTWIDTH] IN BLOOD BY AUTOMATED COUNT: 13.2 % (ref 11.6–14.5)
GLOBULIN SER CALC-MCNC: 3.6 G/DL (ref 2–4)
GLUCOSE SERPL-MCNC: 130 MG/DL (ref 74–99)
HCT VFR BLD AUTO: 41.5 % (ref 35–45)
HGB BLD-MCNC: 14.2 G/DL (ref 12–16)
IMM GRANULOCYTES # BLD AUTO: 0 K/UL (ref 0–0.04)
IMM GRANULOCYTES NFR BLD AUTO: 0 % (ref 0–0.5)
LYMPHOCYTES # BLD: 3.2 K/UL (ref 0.9–3.6)
LYMPHOCYTES NFR BLD: 34 % (ref 21–52)
MAGNESIUM SERPL-MCNC: 2.1 MG/DL (ref 1.6–2.6)
MCH RBC QN AUTO: 29 PG (ref 24–34)
MCHC RBC AUTO-ENTMCNC: 34.2 G/DL (ref 31–37)
MCV RBC AUTO: 84.9 FL (ref 78–100)
MONOCYTES # BLD: 0.6 K/UL (ref 0.05–1.2)
MONOCYTES NFR BLD: 6 % (ref 3–10)
NEUTS SEG # BLD: 5.5 K/UL (ref 1.8–8)
NEUTS SEG NFR BLD: 57 % (ref 40–73)
NRBC # BLD: 0 K/UL (ref 0–0.01)
NRBC BLD-RTO: 0 PER 100 WBC
P-R INTERVAL, ECG05: 224 MS
P-R INTERVAL, ECG05: 232 MS
PLATELET # BLD AUTO: 374 K/UL (ref 135–420)
PMV BLD AUTO: 9.3 FL (ref 9.2–11.8)
POTASSIUM SERPL-SCNC: 2.9 MMOL/L (ref 3.5–5.5)
PROT SERPL-MCNC: 7.5 G/DL (ref 6.4–8.2)
Q-T INTERVAL, ECG07: 332 MS
Q-T INTERVAL, ECG07: 456 MS
QRS DURATION, ECG06: 70 MS
QRS DURATION, ECG06: 84 MS
QTC CALCULATION (BEZET), ECG08: 415 MS
QTC CALCULATION (BEZET), ECG08: 451 MS
RBC # BLD AUTO: 4.89 M/UL (ref 4.2–5.3)
SODIUM SERPL-SCNC: 140 MMOL/L (ref 136–145)
SOURCE, COVRS: NORMAL
TROPONIN-HIGH SENSITIVITY: 5 NG/L (ref 0–54)
TROPONIN-HIGH SENSITIVITY: 5 NG/L (ref 0–54)
VENTRICULAR RATE, ECG03: 59 BPM
VENTRICULAR RATE, ECG03: 94 BPM
WBC # BLD AUTO: 9.6 K/UL (ref 4.6–13.2)

## 2022-08-23 PROCEDURE — 85025 COMPLETE CBC W/AUTO DIFF WBC: CPT

## 2022-08-23 PROCEDURE — 83735 ASSAY OF MAGNESIUM: CPT

## 2022-08-23 PROCEDURE — 74011250636 HC RX REV CODE- 250/636: Performed by: PHYSICIAN ASSISTANT

## 2022-08-23 PROCEDURE — 73502 X-RAY EXAM HIP UNI 2-3 VIEWS: CPT

## 2022-08-23 PROCEDURE — 85379 FIBRIN DEGRADATION QUANT: CPT

## 2022-08-23 PROCEDURE — 93005 ELECTROCARDIOGRAM TRACING: CPT

## 2022-08-23 PROCEDURE — 96366 THER/PROPH/DIAG IV INF ADDON: CPT

## 2022-08-23 PROCEDURE — 83880 ASSAY OF NATRIURETIC PEPTIDE: CPT

## 2022-08-23 PROCEDURE — 80053 COMPREHEN METABOLIC PANEL: CPT

## 2022-08-23 PROCEDURE — 93971 EXTREMITY STUDY: CPT

## 2022-08-23 PROCEDURE — 87635 SARS-COV-2 COVID-19 AMP PRB: CPT

## 2022-08-23 PROCEDURE — 96365 THER/PROPH/DIAG IV INF INIT: CPT

## 2022-08-23 PROCEDURE — 71046 X-RAY EXAM CHEST 2 VIEWS: CPT

## 2022-08-23 PROCEDURE — 99285 EMERGENCY DEPT VISIT HI MDM: CPT

## 2022-08-23 PROCEDURE — 84484 ASSAY OF TROPONIN QUANT: CPT

## 2022-08-23 PROCEDURE — 74011250637 HC RX REV CODE- 250/637: Performed by: PHYSICIAN ASSISTANT

## 2022-08-23 RX ORDER — ASPIRIN 325 MG
325 TABLET ORAL
Status: COMPLETED | OUTPATIENT
Start: 2022-08-23 | End: 2022-08-23

## 2022-08-23 RX ORDER — POTASSIUM CHLORIDE 7.45 MG/ML
10 INJECTION INTRAVENOUS
Status: COMPLETED | OUTPATIENT
Start: 2022-08-23 | End: 2022-08-23

## 2022-08-23 RX ORDER — POTASSIUM CHLORIDE 20 MEQ/1
20 TABLET, EXTENDED RELEASE ORAL 3 TIMES DAILY
Qty: 9 TABLET | Refills: 0 | Status: SHIPPED | OUTPATIENT
Start: 2022-08-23 | End: 2022-08-26

## 2022-08-23 RX ORDER — POTASSIUM CHLORIDE 20 MEQ/1
40 TABLET, EXTENDED RELEASE ORAL
Status: COMPLETED | OUTPATIENT
Start: 2022-08-23 | End: 2022-08-23

## 2022-08-23 RX ADMIN — ASPIRIN 325 MG: 325 TABLET, FILM COATED ORAL at 11:16

## 2022-08-23 RX ADMIN — POTASSIUM CHLORIDE 10 MEQ: 7.45 INJECTION INTRAVENOUS at 13:53

## 2022-08-23 RX ADMIN — POTASSIUM CHLORIDE 40 MEQ: 1500 TABLET, EXTENDED RELEASE ORAL at 13:53

## 2022-08-23 NOTE — DISCHARGE INSTRUCTIONS
Take medication as prescribed. Follow-up with your primary care physician and pulmonologist within 2 days for reassessment. Bring the results from this visit with you for their review. Return to the ED immediately for any new, worsening, or persistent symptoms, including chest pain, weakness, or any other medical concerns.

## 2022-08-23 NOTE — ED TRIAGE NOTES
62 yo f to ED for acute R hip pain that started while she walking the other day.  Denies injury or trauma

## 2022-08-23 NOTE — ED PROVIDER NOTES
EMERGENCY DEPARTMENT HISTORY AND PHYSICAL EXAM    11:02 AM      Date: 8/23/2022  Patient Name: Krysten Milan    History of Presenting Illness     Chief Complaint   Patient presents with    Hip Pain         History Provided By: Patient, Friend     Additional History (Context): Krysten Milan is a 61 y.o. female with  hx of anxiety, HTN, CAD, HLD, LUCY on CPAP and other noted PMH  who presents with multiple medical complaints. Patient notes posterior right hip pain x 3 days. Denies fall or trauma, numbness or tingling, swelling or discoloration. Notes pain is worse with movement. Denies alleviating factors. Patient also notes shortness of breath since Sunday. Notes ambulance was called to the house, but patient declined transport at that time. Notes chills that have resolved. Notes she is fully vaccinated for COVID. Denies sick contacts, known exposure to [employer]. Denies fever, diaphoresis, chest pain, pleuritic or exertional symptoms, orthopnea, nausea or vomiting. Denies history of DVT or PE, hemoptysis, recent surgery or travel, leg swelling, history of CAD. Notes she has a pulmonologist and a cardiologist that she sees regularly. PCP: Maco Arias MD    Current Outpatient Medications   Medication Sig Dispense Refill    potassium chloride (K-DUR, KLOR-CON M20) 20 mEq tablet Take 1 Tablet by mouth three (3) times daily for 3 days. 9 Tablet 0    albuterol (PROVENTIL HFA, VENTOLIN HFA, PROAIR HFA) 90 mcg/actuation inhaler Take 2 Puffs by inhalation every four (4) hours as needed for Wheezing, Shortness of Breath or Cough. 18 g 5    budesonide-formoteroL (Symbicort) 80-4.5 mcg/actuation HFAA Take 1 Puff by inhalation two (2) times a day. 1 Each 5    fluticasone propionate (FLONASE) 50 mcg/actuation nasal spray 2 Sprays by Both Nostrils route daily. 1 Each 5    furosemide (LASIX) 40 mg tablet Take 1 Tablet by mouth daily.  90 Tablet 2    ketotifen (ZADITOR) 0.025 % (0.035 %) ophthalmic solution Administer 1 Drop to both eyes two (2) times a day. cpap machine kit by Does Not Apply route. Indications: DME: [facility]      pyridoxine HCl, vitamin B6, (VITAMIN B-6 PO) Take 1 Tablet by mouth daily. aspirin delayed-release 81 mg tablet Take 1 tablet by mouth once daily 100 Tab 0    cyclobenzaprine (FLEXERIL) 10 mg tablet Take 10 mg by mouth daily as needed. atorvastatin (LIPITOR) 40 mg tablet Take 1 Tab by mouth daily. (Patient taking differently: Take 40 mg by mouth nightly.) 90 Tab 3    hydroCHLOROthiazide (MICROZIDE) 12.5 mg capsule Take 12.5 mg by mouth nightly. 3    dilTIAZem CD (CARTIA XT) 120 mg ER capsule Take 120 mg by mouth daily. naproxen (NAPROSYN) 500 mg tablet Take 500 mg by mouth as needed. omeprazole (PRILOSEC) 20 mg capsule Take 20 mg by mouth daily. Past History     Past Medical History:  Past Medical History:   Diagnosis Date    Anxiety     Arthritis     GERD (gastroesophageal reflux disease)     Hypertension     Interstitial lung disease (HCC)        Past Surgical History:  Past Surgical History:   Procedure Laterality Date    BRONCHOSCOPY  9/10/2019         HX CHOLECYSTECTOMY  2015    HX HYSTERECTOMY      HX OTHER SURGICAL      parotid    HX OTHER SURGICAL      Salivary gland surgery        Family History:  Family History   Problem Relation Age of Onset    Heart Disease Mother     OSTEOARTHRITIS Mother     OSTEOARTHRITIS Father     Heart Surgery Neg Hx     Heart Attack Neg Hx        Social History:  Social History     Tobacco Use    Smoking status: Never    Smokeless tobacco: Never   Vaping Use    Vaping Use: Never used   Substance Use Topics    Alcohol use: No     Alcohol/week: 0.0 standard drinks    Drug use: No       Allergies:  No Known Allergies      Review of Systems       Review of Systems   Constitutional:  Negative for chills and fever. Respiratory:  Positive for shortness of breath. Cardiovascular:  Negative for chest pain. Gastrointestinal:  Negative for abdominal pain, nausea and vomiting. Musculoskeletal:  Positive for arthralgias and myalgias. Skin:  Negative for rash. Neurological:  Negative for weakness. All other systems reviewed and are negative. Physical Exam   Visit Vitals  /77   Pulse 62   Temp 98.2 °F (36.8 °C)   Resp 20   SpO2 99%         Physical Exam  Vitals and nursing note reviewed. Constitutional:       General: She is not in acute distress. Appearance: Normal appearance. She is well-developed. She is not ill-appearing, toxic-appearing or diaphoretic. HENT:      Head: Normocephalic and atraumatic. Cardiovascular:      Rate and Rhythm: Normal rate and regular rhythm. Heart sounds: Normal heart sounds. No murmur heard. No friction rub. No gallop. Pulmonary:      Effort: Pulmonary effort is normal. No respiratory distress. Breath sounds: Normal breath sounds. No wheezing or rales. Abdominal:      General: Abdomen is flat. There is no distension. Palpations: Abdomen is soft. Tenderness: There is no abdominal tenderness. Musculoskeletal:         General: No swelling or deformity. Normal range of motion. Cervical back: Normal range of motion and neck supple. Right hip: Normal.      Comments: No tenderness or discoloration to R leg, dorsalis pedis 2+, ambulatory with steady gait    Skin:     General: Skin is warm. Findings: No rash. Neurological:      General: No focal deficit present. Mental Status: She is alert and oriented to person, place, and time. Cranial Nerves: No cranial nerve deficit. Motor: No weakness.          Diagnostic Study Results     Labs -  Recent Results (from the past 12 hour(s))   EKG, 12 LEAD, INITIAL    Collection Time: 08/23/22 11:15 AM   Result Value Ref Range    Ventricular Rate 94 BPM    Atrial Rate 94 BPM    P-R Interval 224 ms    QRS Duration 70 ms    Q-T Interval 332 ms    QTC Calculation (Bezet) 415 ms Calculated P Axis 63 degrees    Calculated R Axis 92 degrees    Calculated T Axis 46 degrees    Diagnosis       Sinus rhythm with 1st degree AV block  Rightward axis  Low voltage QRS  Cannot rule out Anterior infarct (cited on or before 13-MAY-2016)  Abnormal ECG  When compared with ECG of 14-AUG-2019 09:01,    QRS axis shifted right  Confirmed by Kelly Ledbetter ([id number]) on 8/23/2022 11:32:21 AM     CBC WITH AUTOMATED DIFF    Collection Time: 08/23/22 11:23 AM   Result Value Ref Range    WBC 9.6 4.6 - 13.2 K/uL    RBC 4.89 4.20 - 5.30 M/uL    HGB 14.2 12.0 - 16.0 g/dL    HCT 41.5 35.0 - 45.0 %    MCV 84.9 78.0 - 100.0 FL    MCH 29.0 24.0 - 34.0 PG    MCHC 34.2 31.0 - 37.0 g/dL    RDW 13.2 11.6 - 14.5 %    PLATELET 089 994 - 762 K/uL    MPV 9.3 9.2 - 11.8 FL    NRBC 0.0 0  WBC    ABSOLUTE NRBC 0.00 0.00 - 0.01 K/uL    NEUTROPHILS 57 40 - 73 %    LYMPHOCYTES 34 21 - 52 %    MONOCYTES 6 3 - 10 %    EOSINOPHILS 2 0 - 5 %    BASOPHILS 1 0 - 2 %    IMMATURE GRANULOCYTES 0 0.0 - 0.5 %    ABS. NEUTROPHILS 5.5 1.8 - 8.0 K/UL    ABS. LYMPHOCYTES 3.2 0.9 - 3.6 K/UL    ABS. MONOCYTES 0.6 0.05 - 1.2 K/UL    ABS. EOSINOPHILS 0.2 0.0 - 0.4 K/UL    ABS. BASOPHILS 0.1 0.0 - 0.1 K/UL    ABS. IMM. GRANS. 0.0 0.00 - 0.04 K/UL    DF AUTOMATED     METABOLIC PANEL, COMPREHENSIVE    Collection Time: 08/23/22 11:23 AM   Result Value Ref Range    Sodium 140 136 - 145 mmol/L    Potassium 2.9 (LL) 3.5 - 5.5 mmol/L    Chloride 104 100 - 111 mmol/L    CO2 27 21 - 32 mmol/L    Anion gap 9 3.0 - 18 mmol/L    Glucose 130 (H) 74 - 99 mg/dL    BUN 18 7.0 - 18 MG/DL    Creatinine 0.75 0.6 - 1.3 MG/DL    BUN/Creatinine ratio 24 (H) 12 - 20      GFR est AA >60 >60 ml/min/1.73m2    GFR est non-AA >60 >60 ml/min/1.73m2    Calcium 9.9 8.5 - 10.1 MG/DL    Bilirubin, total 0.7 0.2 - 1.0 MG/DL    ALT (SGPT) 31 13 - 56 U/L    AST (SGOT) 26 10 - 38 U/L    Alk.  phosphatase 77 45 - 117 U/L    Protein, total 7.5 6.4 - 8.2 g/dL    Albumin 3.9 3.4 - 5.0 g/dL Globulin 3.6 2.0 - 4.0 g/dL    A-G Ratio 1.1 0.8 - 1.7     TROPONIN-HIGH SENSITIVITY    Collection Time: 08/23/22 11:23 AM   Result Value Ref Range    Troponin-High Sensitivity 5 0 - 54 ng/L   NT-PRO BNP    Collection Time: 08/23/22 11:23 AM   Result Value Ref Range    NT pro-BNP 42 0 - 900 PG/ML   MAGNESIUM    Collection Time: 08/23/22 11:23 AM   Result Value Ref Range    Magnesium 2.1 1.6 - 2.6 mg/dL   D DIMER    Collection Time: 08/23/22 11:23 AM   Result Value Ref Range    D DIMER 0.30 <0.46 ug/ml(FEU)   COVID-19 RAPID TEST    Collection Time: 08/23/22 11:23 AM   Result Value Ref Range    Specimen source Nasopharyngeal      COVID-19 rapid test Not detected NOTD     TROPONIN-HIGH SENSITIVITY    Collection Time: 08/23/22  2:11 PM   Result Value Ref Range    Troponin-High Sensitivity 5 0 - 54 ng/L   EKG, 12 LEAD, SUBSEQUENT    Collection Time: 08/23/22  2:31 PM   Result Value Ref Range    Ventricular Rate 59 BPM    Atrial Rate 59 BPM    P-R Interval 232 ms    QRS Duration 84 ms    Q-T Interval 456 ms    QTC Calculation (Bezet) 451 ms    Calculated P Axis 45 degrees    Calculated R Axis -9 degrees    Calculated T Axis 26 degrees    Diagnosis       Sinus bradycardia with 1st degree AV block  Inferior infarct , age undetermined  Anterior infarct (cited on or before 13-MAY-2016)  Abnormal ECG  When compared with ECG of 23-AUG-2022 11:15,  Vent. rate has decreased BY  35 BPM  Questionable change in QRS axis  Confirmed by Leda Pool ([id number]) on 8/23/2022 3:30:07 PM         Radiologic Studies -   DUPLEX LOWER EXT VENOUS RIGHT   Final Result      XR CHEST PA LAT   Final Result   No acute findings       XR HIP RT W OR WO PELV 2-3 VWS   Final Result   No acute findings. No evidence of acute deep vein thrombosis in the right common femoral, femoral, popliteal, posterior tibial, and peroneal veins. The veins were imaged in the transverse and longitudinal planes.  The vessels showed normal color filling and compressibility. Doppler interrogation showed phasic and spontaneous flow. Medical Decision Making   I am the first provider for this patient. I reviewed the vital signs, available nursing notes, past medical history, past surgical history, family history and social history. Vital Signs-Reviewed the patient's vital signs. Records Reviewed: Nursing Notes and Old Medical Records (Time of Review: 11:02 AM)    ED Course: Progress Notes, Reevaluation, and Consults:  3:30 PM: Reviewed results with patient and friend, pt is resting comfortably, no distress. Discussed need for close outpatient follow-up this week for reassessment. Discussed strict return precautions, including fever, shortness of breath, or any other medical concerns. In agreement with plan, ready to go home. Provider Notes (Medical Decision Making): 74-year-old female with history of anxiety, hypertension, CAD, obstructive sleep apnea who presents to the ED due to right posterior hip pain. No fall or trauma. No evidence of cellulitis, septic joint, fracture, DVT. Extremity neurovascularly intact. Ambulatory with steady gait. She notes shortness of breath x days. Afebrile, nontoxic-appearing, looks well. No evidence of tachycardia, tachypnea, hypoxia. Troponin negative x 2, dimer within normal limits. COVID negative. CXR without acute process. Do not suspect PE, ACS, or aortic pathology. Patient is stable for discharge with close outpatient follow-up for further assessment. Strict return precautions provided. Diagnosis     Clinical Impression:   1. Dyspnea, unspecified type    2. Hip pain    3.  Hypokalemia        Disposition: home     Follow-up Information       Follow up With Specialties Details Why [address] [facility] EMERGENCY DEPT Emergency Medicine  If symptoms worsen [address]  [phone]    Nikki Sy MD Family Medicine Schedule an appointment as soon as possible for a visit [address] Susan Constantino MD Pulmonary Disease, Urgent Care, Internal Medicine Physician Schedule an appointment as soon as possible for a visit   [address] [place] [address]  [phone]               Discharge Medication List as of 8/23/2022  3:37 PM        START taking these medications    Details   potassium chloride (K-DUR, KLOR-CON M20) 20 mEq tablet Take 1 Tablet by mouth three (3) times daily for 3 days. , Normal, Disp-9 Tablet, R-0           CONTINUE these medications which have NOT CHANGED    Details   albuterol (PROVENTIL HFA, VENTOLIN HFA, PROAIR HFA) 90 mcg/actuation inhaler Take 2 Puffs by inhalation every four (4) hours as needed for Wheezing, Shortness of Breath or Cough., Normal, Disp-18 g, R-5      budesonide-formoteroL (Symbicort) 80-4.5 mcg/actuation HFAA Take 1 Puff by inhalation two (2) times a day., Normal, Disp-1 Each, R-5      fluticasone propionate (FLONASE) 50 mcg/actuation nasal spray 2 Sprays by Both Nostrils route daily. , Normal, Disp-1 Each, R-5      furosemide (LASIX) 40 mg tablet Take 1 Tablet by mouth daily. , Normal, Disp-90 Tablet, R-2      ketotifen (ZADITOR) 0.025 % (0.035 %) ophthalmic solution Administer 1 Drop to both eyes two (2) times a day., Historical Med      cpap machine kit by Does Not Apply route. Indications: DME: [facility], Historical Med      pyridoxine HCl, vitamin B6, (VITAMIN B-6 PO) Take 1 Tablet by mouth daily. , Historical Med      aspirin delayed-release 81 mg tablet Take 1 tablet by mouth once daily, Normal, Disp-100 Tab, R-0      cyclobenzaprine (FLEXERIL) 10 mg tablet Take 10 mg by mouth daily as needed., Historical Med      atorvastatin (LIPITOR) 40 mg tablet Take 1 Tab by mouth daily. , Normal, Disp-90 Tab, R-3      hydroCHLOROthiazide (MICROZIDE) 12.5 mg capsule Take 12.5 mg by mouth nightly., Historical Med, R-3      dilTIAZem CD (CARTIA XT) 120 mg ER capsule Take 120 mg by mouth daily. , Historical Med      naproxen (NAPROSYN) 500 mg tablet Take 500 mg by mouth as needed., Historical Med      omeprazole (PRILOSEC) 20 mg capsule Take 20 mg by mouth daily. , Historical Med             Dictation disclaimer:  Please note that this dictation was completed with [employer], the computer voice recognition software. Quite often unanticipated grammatical, syntax, homophones, and other interpretive errors are inadvertently transcribed by the computer software. Please disregard these errors. Please excuse any errors that have escaped final proofreading.

## 2022-08-23 NOTE — ED NOTES
Pt in triage being seen by provider and now has complaint of chest pain and shortness of breath, pt taken to get EKG done

## 2022-09-05 ENCOUNTER — APPOINTMENT (OUTPATIENT)
Dept: GENERAL RADIOLOGY | Age: 67
End: 2022-09-05
Attending: STUDENT IN AN ORGANIZED HEALTH CARE EDUCATION/TRAINING PROGRAM
Payer: COMMERCIAL

## 2022-09-05 ENCOUNTER — HOSPITAL ENCOUNTER (EMERGENCY)
Age: 67
Discharge: HOME | End: 2022-09-06
Attending: STUDENT IN AN ORGANIZED HEALTH CARE EDUCATION/TRAINING PROGRAM
Payer: COMMERCIAL

## 2022-09-05 DIAGNOSIS — R07.9 CHEST PAIN, UNSPECIFIED TYPE: Primary | ICD-10-CM

## 2022-09-05 LAB
ALBUMIN SERPL-MCNC: 4 G/DL (ref 3.4–5)
ALBUMIN/GLOB SERPL: 1.3 {RATIO} (ref 0.8–1.7)
ALP SERPL-CCNC: 73 U/L (ref 45–117)
ALT SERPL-CCNC: 31 U/L (ref 13–56)
ANION GAP SERPL CALC-SCNC: 8 MMOL/L (ref 3–18)
AST SERPL-CCNC: 28 U/L (ref 10–38)
BASOPHILS # BLD: 0.1 K/UL (ref 0–0.1)
BASOPHILS NFR BLD: 1 % (ref 0–2)
BILIRUB SERPL-MCNC: 0.5 MG/DL (ref 0.2–1)
BUN SERPL-MCNC: 14 MG/DL (ref 7–18)
BUN/CREAT SERPL: 15 (ref 12–20)
CALCIUM SERPL-MCNC: 9.5 MG/DL (ref 8.5–10.1)
CHLORIDE SERPL-SCNC: 107 MMOL/L (ref 100–111)
CO2 SERPL-SCNC: 26 MMOL/L (ref 21–32)
CREAT SERPL-MCNC: 0.94 MG/DL (ref 0.6–1.3)
DIFFERENTIAL METHOD BLD: ABNORMAL
EOSINOPHIL # BLD: 0.3 K/UL (ref 0–0.4)
EOSINOPHIL NFR BLD: 3 % (ref 0–5)
ERYTHROCYTE [DISTWIDTH] IN BLOOD BY AUTOMATED COUNT: 13.4 % (ref 11.6–14.5)
GLOBULIN SER CALC-MCNC: 3.1 G/DL (ref 2–4)
GLUCOSE SERPL-MCNC: 96 MG/DL (ref 74–99)
HCT VFR BLD AUTO: 39.5 % (ref 35–45)
HGB BLD-MCNC: 13.2 G/DL (ref 12–16)
IMM GRANULOCYTES # BLD AUTO: 0 K/UL (ref 0–0.04)
IMM GRANULOCYTES NFR BLD AUTO: 0 % (ref 0–0.5)
LYMPHOCYTES # BLD: 4.3 K/UL (ref 0.9–3.6)
LYMPHOCYTES NFR BLD: 44 % (ref 21–52)
MCH RBC QN AUTO: 29 PG (ref 24–34)
MCHC RBC AUTO-ENTMCNC: 33.4 G/DL (ref 31–37)
MCV RBC AUTO: 86.8 FL (ref 78–100)
MONOCYTES # BLD: 0.6 K/UL (ref 0.05–1.2)
MONOCYTES NFR BLD: 6 % (ref 3–10)
NEUTS SEG # BLD: 4.5 K/UL (ref 1.8–8)
NEUTS SEG NFR BLD: 46 % (ref 40–73)
NRBC # BLD: 0 K/UL (ref 0–0.01)
NRBC BLD-RTO: 0 PER 100 WBC
PLATELET # BLD AUTO: 346 K/UL (ref 135–420)
PMV BLD AUTO: 9.1 FL (ref 9.2–11.8)
POTASSIUM SERPL-SCNC: 3.7 MMOL/L (ref 3.5–5.5)
PROT SERPL-MCNC: 7.1 G/DL (ref 6.4–8.2)
RBC # BLD AUTO: 4.55 M/UL (ref 4.2–5.3)
SODIUM SERPL-SCNC: 141 MMOL/L (ref 136–145)
TROPONIN-HIGH SENSITIVITY: 5 NG/L (ref 0–54)
WBC # BLD AUTO: 9.8 K/UL (ref 4.6–13.2)

## 2022-09-05 PROCEDURE — 93005 ELECTROCARDIOGRAM TRACING: CPT

## 2022-09-05 PROCEDURE — 99285 EMERGENCY DEPT VISIT HI MDM: CPT

## 2022-09-05 PROCEDURE — 71045 X-RAY EXAM CHEST 1 VIEW: CPT

## 2022-09-05 PROCEDURE — 85025 COMPLETE CBC W/AUTO DIFF WBC: CPT

## 2022-09-05 PROCEDURE — 80053 COMPREHEN METABOLIC PANEL: CPT

## 2022-09-05 PROCEDURE — 84484 ASSAY OF TROPONIN QUANT: CPT

## 2022-09-06 VITALS
SYSTOLIC BLOOD PRESSURE: 118 MMHG | HEIGHT: 65 IN | TEMPERATURE: 98.1 F | WEIGHT: 136 LBS | RESPIRATION RATE: 19 BRPM | BODY MASS INDEX: 22.66 KG/M2 | HEART RATE: 69 BPM | OXYGEN SATURATION: 99 % | DIASTOLIC BLOOD PRESSURE: 79 MMHG

## 2022-09-06 LAB
ATRIAL RATE: 72 BPM
CALCULATED P AXIS, ECG09: 57 DEGREES
CALCULATED R AXIS, ECG10: -3 DEGREES
CALCULATED T AXIS, ECG11: 27 DEGREES
DIAGNOSIS, 93000: NORMAL
P-R INTERVAL, ECG05: 222 MS
Q-T INTERVAL, ECG07: 386 MS
QRS DURATION, ECG06: 74 MS
QTC CALCULATION (BEZET), ECG08: 422 MS
TROPONIN-HIGH SENSITIVITY: 5 NG/L (ref 0–54)
VENTRICULAR RATE, ECG03: 72 BPM

## 2022-09-06 NOTE — ED PROVIDER NOTES
EMERGENCY DEPARTMENT HISTORY AND PHYSICAL EXAM    I have evaluated the patient at 10:59 PM      Date: 9/5/2022  Patient Name: Crystal Nieves    History of Presenting Illness     Chief Complaint   Patient presents with    Chest Pain         History Provided By: Patient  Location/Duration/Severity/Modifying factors   32-year-old female with history of anxiety, GERD, hypertension presenting to the emergency department for evaluation of chest pain. Started earlier this evening after dinner. Reports having pain to her left shoulder. Patient reports a tightness but reports that it has improved since its onset earlier tonight. She denies similar pain in past.  Denies any associated symptoms. No exacerbating or relieving factors. PCP: Azam Mari MD    Current Outpatient Medications   Medication Sig Dispense Refill    albuterol (PROVENTIL HFA, VENTOLIN HFA, PROAIR HFA) 90 mcg/actuation inhaler Take 2 Puffs by inhalation every four (4) hours as needed for Wheezing, Shortness of Breath or Cough. 18 g 5    budesonide-formoteroL (Symbicort) 80-4.5 mcg/actuation HFAA Take 1 Puff by inhalation two (2) times a day. 1 Each 5    fluticasone propionate (FLONASE) 50 mcg/actuation nasal spray 2 Sprays by Both Nostrils route daily. 1 Each 5    furosemide (LASIX) 40 mg tablet Take 1 Tablet by mouth daily. 90 Tablet 2    ketotifen (ZADITOR) 0.025 % (0.035 %) ophthalmic solution Administer 1 Drop to both eyes two (2) times a day. cpap machine kit by Does Not Apply route. Indications: DME: [facility]      pyridoxine HCl, vitamin B6, (VITAMIN B-6 PO) Take 1 Tablet by mouth daily. aspirin delayed-release 81 mg tablet Take 1 tablet by mouth once daily 100 Tab 0    cyclobenzaprine (FLEXERIL) 10 mg tablet Take 10 mg by mouth daily as needed. atorvastatin (LIPITOR) 40 mg tablet Take 1 Tab by mouth daily.  (Patient taking differently: Take 40 mg by mouth nightly.) 90 Tab 3    hydroCHLOROthiazide (MICROZIDE) 12.5 mg capsule Take 12.5 mg by mouth nightly. 3    dilTIAZem CD (CARTIA XT) 120 mg ER capsule Take 120 mg by mouth daily. naproxen (NAPROSYN) 500 mg tablet Take 500 mg by mouth as needed. omeprazole (PRILOSEC) 20 mg capsule Take 20 mg by mouth daily. Past History     Past Medical History:  Past Medical History:   Diagnosis Date    Anxiety     Arthritis     GERD (gastroesophageal reflux disease)     Hypertension     Interstitial lung disease (HCC)        Past Surgical History:  Past Surgical History:   Procedure Laterality Date    BRONCHOSCOPY  9/10/2019         HX CHOLECYSTECTOMY  2015    HX HYSTERECTOMY      HX OTHER SURGICAL      parotid    HX OTHER SURGICAL      Salivary gland surgery        Family History:  Family History   Problem Relation Age of Onset    Heart Disease Mother     OSTEOARTHRITIS Mother     OSTEOARTHRITIS Father     Heart Surgery Neg Hx     Heart Attack Neg Hx        Social History:  Social History     Tobacco Use    Smoking status: Never    Smokeless tobacco: Never   Vaping Use    Vaping Use: Never used   Substance Use Topics    Alcohol use: No     Alcohol/week: 0.0 standard drinks    Drug use: No       Allergies:  No Known Allergies      Review of Systems       Review of Systems   Constitutional:  Negative for activity change, chills, diaphoresis, fatigue and fever. Respiratory:  Negative for cough, chest tightness, shortness of breath, wheezing and stridor. Cardiovascular:  Positive for chest pain. Negative for palpitations. Gastrointestinal:  Negative for abdominal distention, abdominal pain, constipation, diarrhea, nausea and vomiting. Genitourinary:  Negative for difficulty urinating, dysuria and hematuria. Musculoskeletal:  Negative for back pain, joint swelling and myalgias. Skin:  Negative for rash and wound. Neurological:  Negative for dizziness, weakness and headaches. Psychiatric/Behavioral:  Negative for agitation. The patient is not nervous/anxious. Physical Exam   Visit Vitals  /79 (BP 1 Location: Right upper arm, BP Patient Position: At rest;Sitting)   Pulse 69   Temp 98.1 °F (36.7 °C)   Resp 19   Ht 5' 5\" (1.651 m)   Wt 61.7 kg (136 lb)   SpO2 100%   BMI 22.63 kg/m²         Physical Exam  Constitutional:       General: She is not in acute distress. Appearance: She is not toxic-appearing. HENT:      Head: Normocephalic and atraumatic. Mouth/Throat:      Mouth: Mucous membranes are moist.   Eyes:      Extraocular Movements: Extraocular movements intact. Pupils: Pupils are equal, round, and reactive to light. Cardiovascular:      Rate and Rhythm: Normal rate and regular rhythm. Heart sounds: Normal heart sounds. No murmur heard. No friction rub. No gallop. Pulmonary:      Effort: Pulmonary effort is normal.      Breath sounds: Normal breath sounds. Abdominal:      General: There is no distension. Palpations: Abdomen is soft. There is no mass. Tenderness: There is no abdominal tenderness. There is no guarding. Hernia: No hernia is present. Musculoskeletal:         General: No swelling, tenderness or deformity. Cervical back: Normal range of motion and neck supple. Skin:     General: Skin is warm and dry. Findings: No rash. Neurological:      General: No focal deficit present. Mental Status: She is alert and oriented to person, place, and time.    Psychiatric:         Mood and Affect: Mood normal.         Diagnostic Study Results     Labs -  Recent Results (from the past 12 hour(s))   EKG, 12 LEAD, INITIAL    Collection Time: 09/05/22 10:10 PM   Result Value Ref Range    Ventricular Rate 72 BPM    Atrial Rate 72 BPM    P-R Interval 222 ms    QRS Duration 74 ms    Q-T Interval 386 ms    QTC Calculation (Bezet) 422 ms    Calculated P Axis 57 degrees    Calculated R Axis -3 degrees    Calculated T Axis 27 degrees    Diagnosis       Sinus rhythm with 1st degree AV block with fusion complexes  Low voltage QRS  Inferior infarct (cited on or before 13-MAY-2016)  Possible Anterolateral infarct (cited on or before 13-MAY-2016)  Abnormal ECG  When compared with ECG of 23-AUG-2022 14:31,  fusion complexes are now present     CBC WITH AUTOMATED DIFF    Collection Time: 09/05/22 10:34 PM   Result Value Ref Range    WBC 9.8 4.6 - 13.2 K/uL    RBC 4.55 4.20 - 5.30 M/uL    HGB 13.2 12.0 - 16.0 g/dL    HCT 39.5 35.0 - 45.0 %    MCV 86.8 78.0 - 100.0 FL    MCH 29.0 24.0 - 34.0 PG    MCHC 33.4 31.0 - 37.0 g/dL    RDW 13.4 11.6 - 14.5 %    PLATELET 652 499 - 144 K/uL    MPV 9.1 (L) 9.2 - 11.8 FL    NRBC 0.0 0  WBC    ABSOLUTE NRBC 0.00 0.00 - 0.01 K/uL    NEUTROPHILS 46 40 - 73 %    LYMPHOCYTES 44 21 - 52 %    MONOCYTES 6 3 - 10 %    EOSINOPHILS 3 0 - 5 %    BASOPHILS 1 0 - 2 %    IMMATURE GRANULOCYTES 0 0.0 - 0.5 %    ABS. NEUTROPHILS 4.5 1.8 - 8.0 K/UL    ABS. LYMPHOCYTES 4.3 (H) 0.9 - 3.6 K/UL    ABS. MONOCYTES 0.6 0.05 - 1.2 K/UL    ABS. EOSINOPHILS 0.3 0.0 - 0.4 K/UL    ABS. BASOPHILS 0.1 0.0 - 0.1 K/UL    ABS. IMM. GRANS. 0.0 0.00 - 0.04 K/UL    DF AUTOMATED     METABOLIC PANEL, COMPREHENSIVE    Collection Time: 09/05/22 10:34 PM   Result Value Ref Range    Sodium 141 136 - 145 mmol/L    Potassium 3.7 3.5 - 5.5 mmol/L    Chloride 107 100 - 111 mmol/L    CO2 26 21 - 32 mmol/L    Anion gap 8 3.0 - 18 mmol/L    Glucose 96 74 - 99 mg/dL    BUN 14 7.0 - 18 MG/DL    Creatinine 0.94 0.6 - 1.3 MG/DL    BUN/Creatinine ratio 15 12 - 20      GFR est AA >60 >60 ml/min/1.73m2    GFR est non-AA 60 (L) >60 ml/min/1.73m2    Calcium 9.5 8.5 - 10.1 MG/DL    Bilirubin, total 0.5 0.2 - 1.0 MG/DL    ALT (SGPT) 31 13 - 56 U/L    AST (SGOT) 28 10 - 38 U/L    Alk.  phosphatase 73 45 - 117 U/L    Protein, total 7.1 6.4 - 8.2 g/dL    Albumin 4.0 3.4 - 5.0 g/dL    Globulin 3.1 2.0 - 4.0 g/dL    A-G Ratio 1.3 0.8 - 1.7     TROPONIN-HIGH SENSITIVITY    Collection Time: 09/05/22 10:34 PM   Result Value Ref Range Troponin-High Sensitivity 5 0 - 54 ng/L   TROPONIN-HIGH SENSITIVITY    Collection Time: 09/05/22 11:30 PM   Result Value Ref Range    Troponin-High Sensitivity 5 0 - 54 ng/L       Radiologic Studies -   XR CHEST PORT   Final Result   1. No acute cardiopulmonary process. Medical Decision Making   I am the first provider for this patient. I reviewed the vital signs, available nursing notes, past medical history, past surgical history, family history and social history. Vital Signs-Reviewed the patient's vital signs. EKG: Sinus rhythm with first-degree block. Records Reviewed: Nursing Notes, Old Medical Records, Previous electrocardiograms, Previous Radiology Studies, and Previous Laboratory Studies (Time of Review: 10:59 PM)    ED Course: Progress Notes, Reevaluation, and Consults:         Provider Notes (Medical Decision Making):   MDM  Number of Diagnoses or Management Options  Chest pain, unspecified type  Diagnosis management comments: 62-year-old female presenting to the emergency department for evaluation of chest pain. She appears well on my exam.  No acute distress. Vital signs are stable. Benign physical exam.  Screening lab work obtained is grossly unremarkable. Chest x-ray is normal.  Will repeat troponin to make sure it is not increasing. I do not suspect PE    0023:  Troponin remains unchanged. Patient is asymptomatic currently. Vital signs stable. Plan for discharge at this time. Patient has been reassured. On follow-up and return precautions. Patient verbalized understanding agreement with plan. Stable for discharge. Procedures    Critical Care Time:       Diagnosis     Clinical Impression:   1.  Chest pain, unspecified type        Disposition: discharged    Follow-up Information       Follow up With Specialties Details Why [address] [facility] EMERGENCY DEPT Emergency Medicine  As needed, If symptoms worsen [address] [facility]    Javed Bahena MD [facility] Medicine Call   [address]   [phone]               Patient's Medications   Start Taking    No medications on file   Continue Taking    ALBUTEROL (PROVENTIL HFA, VENTOLIN HFA, PROAIR HFA) 90 MCG/ACTUATION INHALER    Take 2 Puffs by inhalation every four (4) hours as needed for Wheezing, Shortness of Breath or Cough. ASPIRIN DELAYED-RELEASE 81 MG TABLET    Take 1 tablet by mouth once daily    ATORVASTATIN (LIPITOR) 40 MG TABLET    Take 1 Tab by mouth daily. BUDESONIDE-FORMOTEROL (SYMBICORT) 80-4.5 MCG/ACTUATION HFAA    Take 1 Puff by inhalation two (2) times a day. CPAP MACHINE KIT    by Does Not Apply route. Indications: DME: [facility]    CYCLOBENZAPRINE (FLEXERIL) 10 MG TABLET    Take 10 mg by mouth daily as needed. DILTIAZEM CD (CARTIA XT) 120 MG ER CAPSULE    Take 120 mg by mouth daily. FLUTICASONE PROPIONATE (FLONASE) 50 MCG/ACTUATION NASAL SPRAY    2 Sprays by Both Nostrils route daily. FUROSEMIDE (LASIX) 40 MG TABLET    Take 1 Tablet by mouth daily. HYDROCHLOROTHIAZIDE (MICROZIDE) 12.5 MG CAPSULE    Take 12.5 mg by mouth nightly. KETOTIFEN (ZADITOR) 0.025 % (0.035 %) OPHTHALMIC SOLUTION    Administer 1 Drop to both eyes two (2) times a day. NAPROXEN (NAPROSYN) 500 MG TABLET    Take 500 mg by mouth as needed. OMEPRAZOLE (PRILOSEC) 20 MG CAPSULE    Take 20 mg by mouth daily. PYRIDOXINE HCL, VITAMIN B6, (VITAMIN B-6 PO)    Take 1 Tablet by mouth daily. These Medications have changed    No medications on file   Stop Taking    No medications on file     Disclaimer: Sections of this note are dictated using utilizing voice recognition software. Minor typographical errors may be present. If questions arise, please do not hesitate to contact me or call our department.

## 2022-09-06 NOTE — ED NOTES
Pt given both verbal and written dc instructions, verbalized understanding. All questions answered. Pt ambulatory with steady gait in no distress at time of discharge.

## 2022-09-22 ENCOUNTER — OFFICE VISIT (OUTPATIENT)
Dept: PULMONOLOGY | Age: 67
End: 2022-09-22
Payer: COMMERCIAL

## 2022-09-22 VITALS
RESPIRATION RATE: 14 BRPM | HEIGHT: 65 IN | TEMPERATURE: 97.2 F | DIASTOLIC BLOOD PRESSURE: 72 MMHG | SYSTOLIC BLOOD PRESSURE: 121 MMHG | OXYGEN SATURATION: 95 % | BODY MASS INDEX: 22.99 KG/M2 | WEIGHT: 138 LBS | HEART RATE: 82 BPM

## 2022-09-22 DIAGNOSIS — G47.33 OSA (OBSTRUCTIVE SLEEP APNEA): ICD-10-CM

## 2022-09-22 DIAGNOSIS — Z78.9 INTOLERANCE OF CONTINUOUS POSITIVE AIRWAY PRESSURE (CPAP) VENTILATION: ICD-10-CM

## 2022-09-22 DIAGNOSIS — J45.30 MILD PERSISTENT ASTHMA WITHOUT COMPLICATION: Primary | ICD-10-CM

## 2022-09-22 PROCEDURE — 99214 OFFICE O/P EST MOD 30 MIN: CPT | Performed by: INTERNAL MEDICINE

## 2022-09-22 RX ORDER — POTASSIUM CHLORIDE 750 MG/1
10 TABLET, FILM COATED, EXTENDED RELEASE ORAL 2 TIMES DAILY
COMMUNITY
Start: 2022-08-31 | End: 2022-10-26

## 2022-09-22 RX ORDER — BUDESONIDE AND FORMOTEROL FUMARATE DIHYDRATE 80; 4.5 UG/1; UG/1
2 AEROSOL RESPIRATORY (INHALATION) 2 TIMES DAILY
Qty: 1 EACH | Refills: 5 | Status: SHIPPED | OUTPATIENT
Start: 2022-09-22

## 2022-09-22 RX ORDER — ERGOCALCIFEROL 1.25 MG/1
CAPSULE ORAL
COMMUNITY
Start: 2022-09-19 | End: 2022-10-26

## 2022-09-22 NOTE — PROGRESS NOTES
HISTORY OF PRESENT ILLNESS  Porfirio Bergman is a 61 y.o. female  Pt with initial complaint of dyspnea and was found to have an abnormal CT chest and had a bronchoscopy which was negative. She had some air trapping on CT and was started on Albuterol with some response. She was then started to Symbicort even better response. Lately she has noted a dry cough after CPAP use but also throughout the day, which then triggers retching and nausea when using CPAP. Pt admits to reduced CPAP use and now complains of daytime somnolence and insomnia. She also notes worsening memory loss along with chronic hearing loss. Repeat split night study was ordered and to this point not been scheduled yet. Pt was seen in ED twice since the last visit, once for hip pain and hypokalemia, and second for SOB. Of note, pt has recurrent hypokalemia on review of records, and is on HCTZ and Furosemide. She was on K supplements which had dropped off her list for unknown reasons. This was resumed after that ED visit. Pt is accompanied by her  who helps with history as pt is [place]. Review of Systems   Constitutional:  Positive for weight loss (stable). Negative for chills, diaphoresis, fever and malaise/fatigue. HENT:  Positive for congestion and hearing loss (B hearing aids). Negative for ear discharge, ear pain, nosebleeds, sinus pain, sore throat and tinnitus. Eyes:  Negative for blurred vision, double vision, photophobia, pain, discharge and redness. Respiratory:  Positive for shortness of breath (intermittent). Negative for cough, hemoptysis, sputum production, wheezing and stridor. Cardiovascular:  Negative for chest pain, palpitations, orthopnea, claudication, leg swelling and PND. Gastrointestinal:  Positive for vomiting (improved). Negative for abdominal pain, blood in stool, constipation, diarrhea, heartburn, melena and nausea.    Genitourinary:  Negative for dysuria, flank pain, frequency, hematuria and urgency. Musculoskeletal:  Negative for back pain, falls, joint pain, myalgias and neck pain. Skin:  Negative for itching and rash. Neurological:  Negative for dizziness, tingling, tremors, sensory change, speech change, focal weakness, seizures, loss of consciousness, weakness and headaches. Endo/Heme/Allergies:  Negative for environmental allergies and polydipsia. Does not bruise/bleed easily. Psychiatric/Behavioral:  Negative for depression, hallucinations, memory loss, substance abuse and suicidal ideas. The patient is nervous/anxious. The patient does not have insomnia. Past Medical History:   Diagnosis Date    Anxiety     Arthritis     GERD (gastroesophageal reflux disease)     Hypertension     Interstitial lung disease ([facility] [address]      Past Surgical History:   Procedure Laterality Date    BRONCHOSCOPY  9/10/2019         HX CHOLECYSTECTOMY  2015    HX HYSTERECTOMY      HX OTHER SURGICAL      parotid    HX OTHER SURGICAL      Salivary gland surgery      Current Outpatient Medications on File Prior to Visit   Medication Sig Dispense Refill    ergocalciferol (ERGOCALCIFEROL) 1,250 mcg (50,000 unit) capsule TAKE 1 CAPSULE BY MOUTH ONCE A WEEK      potassium chloride SR (KLOR-CON 10) 10 mEq tablet Take 10 mEq by mouth two (2) times a day. albuterol (PROVENTIL HFA, VENTOLIN HFA, PROAIR HFA) 90 mcg/actuation inhaler Take 2 Puffs by inhalation every four (4) hours as needed for Wheezing, Shortness of Breath or Cough. 18 g 5    fluticasone propionate (FLONASE) 50 mcg/actuation nasal spray 2 Sprays by Both Nostrils route daily. 1 Each 5    ketotifen (ZADITOR) 0.025 % (0.035 %) ophthalmic solution Administer 1 Drop to both eyes two (2) times a day. pyridoxine HCl, vitamin B6, (VITAMIN B-6 PO) Take 1 Tablet by mouth daily. aspirin delayed-release 81 mg tablet Take 1 tablet by mouth once daily 100 Tab 0    atorvastatin (LIPITOR) 40 mg tablet Take 1 Tab by mouth daily.  (Patient taking differently: Take 40 mg by mouth nightly.) 90 Tab 3    hydroCHLOROthiazide (MICROZIDE) 12.5 mg capsule Take 12.5 mg by mouth nightly. 3    dilTIAZem CD (CARTIA XT) 120 mg ER capsule Take 120 mg by mouth daily. naproxen (NAPROSYN) 500 mg tablet Take 500 mg by mouth as needed. omeprazole (PRILOSEC) 20 mg capsule Take 20 mg by mouth daily. furosemide (LASIX) 40 mg tablet Take 1 Tablet by mouth daily. 90 Tablet 2    cpap machine kit by Does Not Apply route. Indications: DME: [facility] (Patient not taking: Reported on 9/22/2022)      cyclobenzaprine (FLEXERIL) 10 mg tablet Take 10 mg by mouth daily as needed. (Patient not taking: Reported on 9/22/2022)       No current facility-administered medications on file prior to visit. No Known Allergies  Family History   Problem Relation Age of Onset    Heart Disease Mother     OSTEOARTHRITIS Mother     OSTEOARTHRITIS Father     Heart Surgery Neg Hx     Heart Attack Neg Hx      Social History     Socioeconomic History    Marital status:      Spouse name: Not on file    Number of children: Not on file    Years of education: Not on file    Highest education level: Not on file   Occupational History    Not on file   Tobacco Use    Smoking status: Never    Smokeless tobacco: Never   Vaping Use    Vaping Use: Never used   Substance and Sexual Activity    Alcohol use: No     Alcohol/week: 0.0 standard drinks    Drug use: No    Sexual activity: Not on file   Other Topics Concern    Not on file   Social History Narrative    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 She has no pets.  No exposure to asbestos, feathers, pets     Social Determinants of Health     Financial Resource Strain: Not on file   Food Insecurity: Not on file   Transportation Needs: Not on file   Physical Activity: Not on file   Stress: Not on file   Social Connections: Not on file   Intimate Partner Violence: Not on file   Housing Stability: Not on file     Blood pressure 121/72, pulse 82, temperature 97.2 °F (36.2 °C), temperature source Temporal, resp. rate 14, height 5' 5\" (1.651 m), weight 62.6 kg (138 lb), SpO2 95 %. Physical Exam  Constitutional:       General: She is not in acute distress. Appearance: Normal appearance. She is not ill-appearing, toxic-appearing or diaphoretic. HENT:      Head: Normocephalic and atraumatic. Right Ear: External ear normal.      Left Ear: External ear normal.      Nose:      Comments: Deferred      Mouth/Throat:      Comments: Deferred   Eyes:      General: No scleral icterus. Right eye: No discharge. Left eye: No discharge. Extraocular Movements: Extraocular movements intact. Conjunctiva/sclera: Conjunctivae normal.      Pupils: Pupils are equal, round, and reactive to light. Neck:      Vascular: No carotid bruit. Cardiovascular:      Rate and Rhythm: Normal rate and regular rhythm. Pulses: Normal pulses. Heart sounds: Normal heart sounds. No murmur heard. No friction rub. No gallop. Pulmonary:      Effort: Pulmonary effort is normal. No respiratory distress. Breath sounds: Normal breath sounds. No stridor. No wheezing, rhonchi or rales. Chest:      Chest wall: No tenderness. Abdominal:      General: There is no distension. Palpations: Abdomen is soft. There is no mass. Tenderness: There is no abdominal tenderness. There is no rebound. Musculoskeletal:         General: No swelling, tenderness, deformity or signs of injury. Cervical back: Neck supple. No rigidity or tenderness. No muscular tenderness. Right lower leg: No edema. Left lower leg: No edema. Lymphadenopathy:      Cervical: No cervical adenopathy. Skin:     General: Skin is warm and dry. Coloration: Skin is not jaundiced or pale. Findings: No bruising, erythema, lesion or rash. Neurological:      General: No focal deficit present.       Mental Status: She is alert and oriented to person, place, and time. Motor: No weakness. Coordination: Coordination normal.   Psychiatric:         Mood and Affect: Mood normal.         Behavior: Behavior normal.         Thought Content: Thought content normal.         Judgment: Judgment normal.     CT Results (most recent):  Results from Hospital Encounter encounter on 01/21/20    CT CHEST WO CONT    Narrative  HIGH RESOLUTION CT CHEST    CPT CODE: 90045    COMPARISON: 7/30/2019. INDICATIONS: Mosaic attenuation at the lung bases on prior CT with high  resolution CT recommended. TECHNIQUE: Axial scanning of the chest is performed with high resolution thin  sections in both inspiration and expiration. Additional inspiratory prone images  were performed. All CT scans at this facility are performed using dose optimization technique as  appropriate to a performed exam, to include automated exposure control,  adjustment of the mA and/or kV according to patient size (including appropriate  matching first site-specific examinations), or use of iterative reconstruction  technique. FINDINGS:  Thyroid: Unremarkable in its visualized aspects. Pericardium/ Heart: No significant effusion. Lipomatous hypertrophy of the  pericardium and intra-atrial septum. Severe LAD coronary arteriosclerosis. Aorta/ Vessels: Aorta is normal in caliber. .  Mild atherosclerosis. Lymph Nodes: Unremarkable. .    Lungs: Mild mosaic lung attenuation at the bases. Mild bilateral lower lobe  bronchial wall thickening. No significant reticulation or groundglass. There are  multifocal regions of air trapping on expiration view. The mosaic attenuation  improves with prone imaging. Pleura: No effusion. Upper Abdomen: Cholelithiasis. Bones/soft tissues: Osteopenia. Degenerative disc disease. Impression  IMPRESSION:    1. Mosaic attenuation with multifocal regions of air trapping.  Mild bilateral  lower lobe bronchial wall thickening which may represent bronchitis, bronchial  wall edema or reactive airway disease. 2.  Lipomatous hypertrophy of the pericardium and interatrial septum. 3.  Severe LAD coronary arteriosclerosis. 4.  Cholelithiasis. ASSESSMENT and PLAN  Encounter Diagnoses   Name Primary? Mild persistent asthma without complication Yes    LUCY (obstructive sleep apnea)     Intolerance of continuous positive airway pressure (CPAP) ventilation      Pt with apparent intolerance to APAP, still awaits sleep study ordered previously. Will call sleep Center to facilitate. Increased cough also plays a part and may be due to worsening asthma control. Pt is only using Symbicort 1 puff bid. Instructed her to use 2 puffs bid and monitor response. Instructed pt to discontinue Furosemide pending next visit with her PCP in light of hypokalemia and worsening GFR.   RTC 6 months  [place] on next visit

## 2022-09-22 NOTE — PROGRESS NOTES
Patient declines the influenza vaccine at this time. [place] presents today for   Chief Complaint   Patient presents with    [address] Follow Up     Multiple Ed visits since last appointment    Asthma       Is someone accompanying this pt? Yes - spouse    Is the patient using any DME equipment during OV? Yes - cpap    -[employer]    Depression Screening:  3 most recent PHQ Screens 9/22/2022   Little interest or pleasure in doing things Not at all   Feeling down, depressed, irritable, or hopeless Not at all   Total Score PHQ 2 0       Learning Assessment:  Learning Assessment 8/15/2019   PRIMARY LEARNER Patient   PRIMARY LANGUAGE ENGLISH   LEARNER PREFERENCE PRIMARY LISTENING   ANSWERED BY patient   RELATIONSHIP SELF       Abuse Screening:  Abuse Screening Questionnaire 2/15/2022   Do you ever feel afraid of your partner? N   Are you in a relationship with someone who physically or mentally threatens you? N   Is it safe for you to go home? Y       Fall Risk  Fall Risk Assessment, last 12 mths 9/22/2022   Able to walk? Yes   Fall in past 12 months? 0   Do you feel unsteady? 0   Are you worried about falling 0         Coordination of Care:  1. Have you been to the ER, urgent care clinic since your last visit? Hospitalized since your last visit? Yes; Where: [facility], When: August & September 2022    2. Have you seen or consulted any other health care providers outside of the [address] [place] [address] since your last visit? Include any pap smears or colon screening. N/a    Medication variance in dosage/sig per patient as follows: med list updated as best as possible per patient.

## 2022-09-30 ENCOUNTER — HOSPITAL ENCOUNTER (OUTPATIENT)
Dept: LAB | Age: 67
Discharge: HOME | End: 2022-09-30

## 2022-09-30 LAB — XX-LABCORP SPECIMEN COL,LCBCF: NORMAL

## 2022-09-30 PROCEDURE — 99001 SPECIMEN HANDLING PT-LAB: CPT

## 2022-10-26 ENCOUNTER — OFFICE VISIT (OUTPATIENT)
Dept: CARDIOLOGY CLINIC | Age: 67
End: 2022-10-26
Payer: MEDICARE

## 2022-10-26 VITALS
SYSTOLIC BLOOD PRESSURE: 133 MMHG | HEART RATE: 90 BPM | DIASTOLIC BLOOD PRESSURE: 74 MMHG | HEIGHT: 65 IN | WEIGHT: 135 LBS | BODY MASS INDEX: 22.49 KG/M2 | OXYGEN SATURATION: 98 %

## 2022-10-26 DIAGNOSIS — I10 ESSENTIAL HYPERTENSION: ICD-10-CM

## 2022-10-26 DIAGNOSIS — I25.84 CORONARY ARTERY CALCIFICATION: Primary | ICD-10-CM

## 2022-10-26 DIAGNOSIS — I25.10 CORONARY ARTERY CALCIFICATION: Primary | ICD-10-CM

## 2022-10-26 DIAGNOSIS — E87.6 HYPOKALEMIA: ICD-10-CM

## 2022-10-26 DIAGNOSIS — E78.5 HYPERLIPIDEMIA, UNSPECIFIED HYPERLIPIDEMIA TYPE: ICD-10-CM

## 2022-10-26 DIAGNOSIS — J84.9 INTERSTITIAL LUNG DISEASE (HCC): ICD-10-CM

## 2022-10-26 DIAGNOSIS — R06.02 SOB (SHORTNESS OF BREATH): ICD-10-CM

## 2022-10-26 PROCEDURE — 1090F PRES/ABSN URINE INCON ASSESS: CPT | Performed by: INTERNAL MEDICINE

## 2022-10-26 PROCEDURE — G9899 SCRN MAM PERF RSLTS DOC: HCPCS | Performed by: INTERNAL MEDICINE

## 2022-10-26 PROCEDURE — G8427 DOCREV CUR MEDS BY ELIG CLIN: HCPCS | Performed by: INTERNAL MEDICINE

## 2022-10-26 PROCEDURE — G8752 SYS BP LESS 140: HCPCS | Performed by: INTERNAL MEDICINE

## 2022-10-26 PROCEDURE — G8400 PT W/DXA NO RESULTS DOC: HCPCS | Performed by: INTERNAL MEDICINE

## 2022-10-26 PROCEDURE — 3074F SYST BP LT 130 MM HG: CPT | Performed by: INTERNAL MEDICINE

## 2022-10-26 PROCEDURE — G8432 DEP SCR NOT DOC, RNG: HCPCS | Performed by: INTERNAL MEDICINE

## 2022-10-26 PROCEDURE — G8420 CALC BMI NORM PARAMETERS: HCPCS | Performed by: INTERNAL MEDICINE

## 2022-10-26 PROCEDURE — 3017F COLORECTAL CA SCREEN DOC REV: CPT | Performed by: INTERNAL MEDICINE

## 2022-10-26 PROCEDURE — 99214 OFFICE O/P EST MOD 30 MIN: CPT | Performed by: INTERNAL MEDICINE

## 2022-10-26 PROCEDURE — 3078F DIAST BP <80 MM HG: CPT | Performed by: INTERNAL MEDICINE

## 2022-10-26 PROCEDURE — G8536 NO DOC ELDER MAL SCRN: HCPCS | Performed by: INTERNAL MEDICINE

## 2022-10-26 PROCEDURE — 1123F ACP DISCUSS/DSCN MKR DOCD: CPT | Performed by: INTERNAL MEDICINE

## 2022-10-26 PROCEDURE — G8754 DIAS BP LESS 90: HCPCS | Performed by: INTERNAL MEDICINE

## 2022-10-26 PROCEDURE — 1101F PT FALLS ASSESS-DOCD LE1/YR: CPT | Performed by: INTERNAL MEDICINE

## 2022-10-26 RX ORDER — POTASSIUM CHLORIDE 750 MG/1
10 TABLET, FILM COATED, EXTENDED RELEASE ORAL 2 TIMES DAILY
Qty: 90 TABLET | Refills: 3 | Status: SHIPPED | OUTPATIENT
Start: 2022-10-26

## 2022-10-26 NOTE — PROGRESS NOTES
1. Have you been to the ER, urgent care clinic since your last visit? Hospitalized since your last visit? Yes When: 9/2022 Where: [facility] Reason for visit: chest pain    2. Have you seen or consulted any other health care providers outside of the [address] [place] [address] since your last visit? Include any pap smears or colon screening.       No

## 2022-10-26 NOTE — PROGRESS NOTES
HISTORY OF PRESENT ILLNESS  Yolette Glover is a 74 y.o. female. 2/2021  Patient is here for follow-up earlier than her usual visit. She has been having episodes of sudden shortness of breath and tightness this happens at nighttime. She is usually on CPAP she is to get off CPAP and go outside for air. This is happened frequently lately. Denies any edema denies any chest tightness on exertion but she cannot walk much. Denies any fever chills or cough. 8/2022  Patient seen in follow - up. She reports has been moving this week and c/o cough with shortness of breath. She reports is not longer on CPAP or inhaler (unsure of name) because it makes her cough and vomit. She has been off of both x 2 weeks. She denies chest pain, palpitations or edema. Follow-up  The history is provided by the Patient and medical records. Associated symptoms include shortness of breath. Pertinent negatives include no chest pain, no abdominal pain and no headaches. Hypertension  The history is provided by the Patient. This is a chronic problem. The problem occurs constantly. Associated symptoms include shortness of breath. Pertinent negatives include no chest pain, no abdominal pain and no headaches. Shortness of Breath  The history is provided by the Patient. This is a recurrent problem. The problem occurs intermittently. The problem has been gradually improving. Associated symptoms include cough. Pertinent negatives include no fever, no headaches, no sputum production, no hemoptysis, no wheezing, no PND, no orthopnea, no chest pain, no vomiting, no abdominal pain, no rash, no leg swelling and no claudication. The problem's precipitants include exercise. Cholesterol Problem  The history is provided by the Patient. This is a chronic problem. The problem occurs constantly. The problem has not changed since onset. Associated symptoms include shortness of breath.  Pertinent negatives include no chest pain, no abdominal pain and no headaches. Review of Systems   Constitutional:  Negative for chills and fever. HENT:  Negative for nosebleeds. Eyes:  Negative for blurred vision and double vision. Respiratory:  Positive for cough and shortness of breath. Negative for hemoptysis, sputum production and wheezing. Cardiovascular:  Negative for chest pain, palpitations, orthopnea, claudication, leg swelling and PND. Gastrointestinal:  Negative for abdominal pain, heartburn, nausea and vomiting. Musculoskeletal:  Negative for myalgias. Skin:  Negative for rash. Neurological:  Negative for dizziness, weakness and headaches. Endo/Heme/Allergies:  Does not bruise/bleed easily. Family History   Problem Relation Age of Onset    Heart Disease Mother     OSTEOARTHRITIS Mother     OSTEOARTHRITIS Father     Heart Surgery Neg Hx     Heart Attack Neg Hx        Past Medical History:   Diagnosis Date    Anxiety     Arthritis     GERD (gastroesophageal reflux disease)     Hypertension     Interstitial lung disease ([address]        Past Surgical History:   Procedure Laterality Date    BRONCHOSCOPY  9/10/2019         HX CHOLECYSTECTOMY  2015    HX HYSTERECTOMY      HX OTHER SURGICAL      parotid    HX OTHER SURGICAL      Salivary gland surgery        Social History     Tobacco Use    Smoking status: Never    Smokeless tobacco: Never   Substance Use Topics    Alcohol use: No     Alcohol/week: 0.0 standard drinks       No Known Allergies        Visit Vitals  /74   Pulse 90   Ht 5' 5\" (1.651 m)   Wt 61.2 kg (135 lb)   SpO2 98%   BMI 22.47 kg/m²       Physical Exam  Vitals and nursing note reviewed. Constitutional:       Appearance: Normal appearance. She is well-developed. HENT:      Head: Normocephalic and atraumatic. Eyes:      Conjunctiva/sclera: Conjunctivae normal.   Neck:      Thyroid: No thyromegaly. Vascular: No JVD. Trachea: No tracheal deviation.    Cardiovascular:      Rate and Rhythm: Normal rate and regular rhythm. Chest Wall: PMI is not displaced. Pulses: Intact distal pulses. No decreased pulses. Heart sounds: Normal heart sounds. No murmur heard. No gallop. No S3 sounds. Pulmonary:      Effort: No respiratory distress. Breath sounds: No wheezing or rales. Chest:      Chest wall: No tenderness. Abdominal:      Palpations: Abdomen is soft. Tenderness: There is no abdominal tenderness. Musculoskeletal:      Cervical back: Neck supple. Right lower leg: No edema. Left lower leg: No edema. Skin:     General: Skin is warm. Neurological:      Mental Status: She is alert and oriented to person, place, and time. Ms. Lj Campo has a reminder for a \"due or due soon\" health maintenance. I have asked that she contact her primary care provider for follow-up on this health maintenance. I have personally reviewed patients ekg done at other facility. 2016  Sinus rhythm with occasional premature ventricular complexes   Inferior infarct , age undetermined   Cannot rule out Anterior infarct , age undetermined   Abnormal ECG   No previous ECGs available   NUCLEAR IMAGIN2016     Findings:    1. Stress images reveal normal Myoview distrubution in all the LV segments in short axis, vertical and horizontal long axis views. 2. Resting images have a normal uptake. 3. Gated images reveal normal wall motion and the ejection fraction is calculated to be 79%. Conclusion:    1. Normal perfusion scan. 2. Normal wall motion and ejection fraction. 3. Low risk scan. SUMMARY:echo:2016  Procedure information: This was a technically difficult study. Left ventricle: Systolic function was normal. Ejection fraction was  estimated to be 60 %. No obvious wall motion abnormalities identified in  the views obtained. There was mild concentric hypertrophy.  Doppler  parameters were consistent with abnormal left ventricular relaxation  (grade 1 diastolic dysfunction). Pericardium: A circumferential pericardial fat pad was present . IMPRESSION 7/2019  IMPRESSION:     1. Air trapping in the lung bases suggesting presence of interstitial lung  disease. Limited evaluation. Follow-up with CT of the thorax is recommended     The final Radiology portion of this exam was provided by Dr. Scott Menard on  7/30/2019 10:48 AM.     The final Cardiology report will follow. END RADIOLOGY PORTION OF REPORT     CARDIOLOGY REPORT:   7/2019     The patient underwent a limited noncontrast CT scan of her chest with cardiac  gating to evaluate for coronary arterial calcification. Using the Agatston  method the coronary calcium score was calculated. There is a large amount of  coronary calcification present in all 3 major coronary vessels. Coronary calcium  score calculated 393. Procedure Conclusion     Nuclear Stress Test 7/2019    Negative myocardial perfusion imaging. Myocardial perfusion imaging supports a low risk stress test.   There is a prior study available for comparison. As compared to the previous study, there are no significant changes. Interpretation Summary 7/2019       Left Ventricle: Normal cavity size and systolic function (ejection fraction normal). Mild concentric hypertrophy. Estimated left ventricular ejection fraction is 61 - 65%. No regional wall motion abnormality noted. Mild (grade 1) left ventricular diastolic dysfunction. Right Ventricle: Normal right ventricular size and function. No hemodynamically significant valvular pathology. Pericardium: A circumferential pericardial effusion with a pericardial fat pad was present. Impression: Pulmonary evaluation-8/2019  1. Dyspnea/shortness of breath: Etiology most likely due to ILD given extensive cardiac work-up that is mainly unremarkable. There may be a mild component of deconditioning, but appears to be mainly driven by ILD given groundglass opacities  2.   Interstitial lung disease: Based on lung windows of CT heart without contrast, scattered groundglass infiltrates seen through all lung fields. 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 No evidence of subpleural fibrosis or honeycombing. Differential diagnosis remains extensive-bronchiolitis obliterans, HP, NSIP, etc.       I Have personally reviewed recent relevant labs available and discussed with patient  8/2020  Cbc,bmp,lipids  Results for Jn Bernal (MRN [id number]) as of 4/7/2021 11:15   Ref. Range 3/10/2021 07:14   ALT Latest Ref Range: 13 - 56 U/L 38   AST Latest Ref Range: 10 - 38 U/L 23   Alk. phosphatase Latest Ref Range: 45 - 117 U/L 95   Triglyceride Latest Ref Range: <150 MG/   Cholesterol, total Latest Ref Range: <200 MG/   HDL Cholesterol Latest Ref Range: 40 - 60 MG/DL 57   CHOL/HDL Ratio Latest Ref Range: 0 - 5.0   3.1   VLDL, calculated Latest Units: MG/DL 24.2   LDL, calculated Latest Ref Range: 0 - 100 MG/DL 97.8       Procedure Conclusion    Nuclear Stress Test    Nuclear Cardiac Spect Rest then Gated Stress study. Shavonne Union was used as the stressing method and agent. (Shavonne Union given via a 10 - 20 sec injection.)   One day myocardial perfusion study. Date: 2/19/2021. Left ventricular perfusion is normal. Myocardial perfusion imaging supports a low risk stress test.   There is a prior study available for comparison. . As compared to the previous study, there are no significant changes     Interpretation Summary    LV: Estimated LVEF is 60 - 65%. Normal cavity size, systolic function (ejection fraction normal) and diastolic function. Mild concentric hypertrophy. Wall motion: normal.  RV: Normal right ventricular size and function. No hemodynamically significant valvular pathology. Pericardium: Pericardial fat pad present. Assessment         ICD-10-CM ICD-9-CM    1.  Coronary artery calcification  I25.10 414.00     I25.84 414.4     Stable continue monitor      2. SOB (shortness of breath)  R06.02 786.05     Stable multifactorial monitor      3. Interstitial lung disease ([address]  J84.9 515     With pulmonary stable      4. Essential hypertension  I10 401.9     Stable monitor      5. Hyperlipidemia, unspecified hyperlipidemia type  E78.5 272.4     Continue treatment lab with patient      6. Hypokalemia  E87.6 276.8         6/2019  Cardiac symptoms stable. Blood pressure elevated started Toprol 50 mg a day. Follow-up lab with PCP    7/2019 - EKG - ST with right axis deviation - She stopped taking Toprol stating it made he feel bad. Continues to c/o SOB with chest pain on exertion. She also c/o epigastric burning and frequent belching. Will obtain NST, Echo and calcium score to r/o ischemia. Referral to GI for reflux symptoms. Fasting lipids ordered. 7/31/2019  CTA showing moderate calcium score. Also suggested interstitial lung disease. Negative stress test.  Remains short of breath with occasional atypical chest pains. Add Imdur and aspirin. Continue diltiazem and statin. Referred to pulmonary. If no significant pulmonary problem consider invasive cardiac work-up to assess for possible angina equivalent related shortness of breath  9/2019  Short of breath on and off. Pulmonary fibrosis as etiology. Being worked up by Dr. Ahmet Garcia. LDL more than 100 I have increase Lipitor  3/2020  Cardiac status stable. Continue current treatment now on CPAP for sleep apnea  9/2020  Cardiac status stable. LDL less than 100. Continue current medical management  2/2021  Seen for increasing shortness of breath worse at night with orthopnea and PND. No clear fluid overload I have added diuretic. Follow-up labs check echo and nuclear stress test  4/20/2021  Shortness of breath multifactorial.  Continue with aspirin statin and diltiazem.   Negative nuclear stress test echo with normal EF monitor  10/2021  Cardiac status stable continue medical management. 4/27/2022  Stable cardiac status. Shortness of breath stable continue treatment  8/2022  Seen for c/o shortness of breath with cough. Denies chest pain, edema or palpitations. She stopped using inhaler ( not sure of name) and CPAP > 2 weeks ago due to causing nausea and vomiting. Prior cardiac testing reviewed and discussed with patient, Stress test 3/2021 negative for ischemia, Echo normal EF, no significant valvular pathology. In office EKG SR with right axis deviation, unchanged from prior. Dyspnea likely pulmonary in etiology. Advise to follow up with pulmonary and need to resume CPAP if able. 10/2022  Shortness of breath improving. Recent ER visit where she had hypokalemia likely from HCTZ. Off Lasix. We will continue with low-dose potassium supplement in view of continuing HCTZ. Blood pressure stable. Medications Discontinued During This Encounter   Medication Reason    ergocalciferol (ERGOCALCIFEROL) 1,250 mcg (50,000 unit) capsule Not A Current Medication    albuterol (PROVENTIL HFA, VENTOLIN HFA, PROAIR HFA) 90 mcg/actuation inhaler Not A Current Medication    furosemide (LASIX) 40 mg tablet Not A Current Medication    cpap machine kit Not A Current Medication    naproxen (NAPROSYN) 500 mg tablet Not A Current Medication    potassium chloride SR (KLOR-CON 10) 10 mEq tablet          Orders Placed This Encounter    potassium chloride SR (KLOR-CON 10) 10 mEq tablet     Sig: Take 1 Tablet by mouth two (2) times a day.      Dispense:  90 Tablet     Refill:  3               Jose Caro MD

## 2022-10-28 ENCOUNTER — TELEPHONE (OUTPATIENT)
Dept: CARDIOLOGY CLINIC | Age: 67
End: 2022-10-28

## 2022-10-28 NOTE — TELEPHONE ENCOUNTER
Patient stated she do not want to go to the ER. She stated she would like for you to speak with her. Please advise.

## 2022-12-14 ENCOUNTER — OFFICE VISIT (OUTPATIENT)
Dept: PULMONOLOGY | Age: 67
End: 2022-12-14
Payer: MEDICARE

## 2022-12-14 ENCOUNTER — TRANSCRIBE ORDER (OUTPATIENT)
Dept: SCHEDULING | Age: 67
End: 2022-12-14

## 2022-12-14 VITALS
WEIGHT: 137.7 LBS | SYSTOLIC BLOOD PRESSURE: 151 MMHG | RESPIRATION RATE: 18 BRPM | BODY MASS INDEX: 22.94 KG/M2 | TEMPERATURE: 97.4 F | OXYGEN SATURATION: 99 % | HEART RATE: 99 BPM | HEIGHT: 65 IN | DIASTOLIC BLOOD PRESSURE: 86 MMHG

## 2022-12-14 DIAGNOSIS — G47.33 OSA (OBSTRUCTIVE SLEEP APNEA): ICD-10-CM

## 2022-12-14 DIAGNOSIS — Z78.9 INTOLERANCE OF CONTINUOUS POSITIVE AIRWAY PRESSURE (CPAP) VENTILATION: ICD-10-CM

## 2022-12-14 DIAGNOSIS — J45.30 MILD PERSISTENT ASTHMA WITHOUT COMPLICATION: Primary | ICD-10-CM

## 2022-12-14 NOTE — PROGRESS NOTES
HISTORY OF PRESENT ILLNESS  Marcos Velazco is a 74 y.o. female with asthma and LUCY  Pt with initial complaint of dyspnea and was found to have an abnormal CT chest and had a bronchoscopy which was negative. She had some air trapping on CT and was started on Albuterol with some response. She was then started to Symbicort with better response akanksha with regular use. Pt admits to reduced CPAP use due to cough and intolerance of her new CPAP mask and now complains of daytime somnolence and insomnia. She also voiced her concern about the latest Jessica Lamprey recall due to magnetic components for the face masks. Explained to pt that the concern is for people with pacemakers or similar devices that can be affected by magnets. Repeat split night study was ordered and to this point not been scheduled yet despite pt calling the sleep center several times. Pt was seen in ED twice this past year, once for hip pain and hypokalemia, and second for SOB. Of note, pt has recurrent hypokalemia on review of records, and is on HCTZ and Furosemide. She was on K supplements which had dropped off her list for unknown reasons. This was resumed after that ED visit. Pt is accompanied by her  who helps with history as pt is [facility]. Review of Systems   Constitutional:  Positive for weight loss (stable). Negative for chills, diaphoresis, fever and malaise/fatigue. HENT:  Positive for hearing loss (B hearing aids). Negative for congestion, ear discharge, ear pain, nosebleeds, sinus pain, sore throat and tinnitus. Eyes:  Negative for blurred vision, double vision, photophobia, pain, discharge and redness. Respiratory:  Positive for shortness of breath (intermittent). Negative for cough, hemoptysis, sputum production, wheezing and stridor. Cardiovascular:  Negative for chest pain, palpitations, orthopnea, claudication, leg swelling and PND. Gastrointestinal:  Positive for vomiting (resolved).  Negative for abdominal pain, blood in stool, constipation, diarrhea, heartburn, melena and nausea. Genitourinary:  Negative for dysuria, flank pain, frequency, hematuria and urgency. Musculoskeletal:  Negative for back pain, falls, joint pain, myalgias and neck pain. Skin:  Negative for itching and rash. Neurological:  Negative for dizziness, tingling, tremors, sensory change, speech change, focal weakness, seizures, loss of consciousness, weakness and headaches. Endo/Heme/Allergies:  Negative for environmental allergies and polydipsia. Does not bruise/bleed easily. Psychiatric/Behavioral:  Negative for depression, hallucinations, memory loss, substance abuse and suicidal ideas. The patient is nervous/anxious. The patient does not have insomnia. Past Medical History:   Diagnosis Date    Anxiety     Arthritis     GERD (gastroesophageal reflux disease)     Hypertension     Interstitial lung disease [address] [address]      Past Surgical History:   Procedure Laterality Date    BRONCHOSCOPY  9/10/2019         HX CHOLECYSTECTOMY  2015    HX HYSTERECTOMY      HX OTHER SURGICAL      parotid    HX OTHER SURGICAL      Salivary gland surgery      Current Outpatient Medications on File Prior to Visit   Medication Sig Dispense Refill    potassium chloride SR (KLOR-CON 10) 10 mEq tablet Take 1 Tablet by mouth two (2) times a day. 90 Tablet 3    budesonide-formoteroL (Symbicort) 80-4.5 mcg/actuation HFAA Take 2 Puffs by inhalation two (2) times a day. 1 Each 5    fluticasone propionate (FLONASE) 50 mcg/actuation nasal spray 2 Sprays by Both Nostrils route daily. 1 Each 5    ketotifen (ZADITOR) 0.025 % (0.035 %) ophthalmic solution Administer 1 Drop to both eyes two (2) times a day. pyridoxine HCl, vitamin B6, (VITAMIN B-6 PO) Take 1 Tablet by mouth daily. aspirin delayed-release 81 mg tablet Take 1 tablet by mouth once daily 100 Tab 0    cyclobenzaprine (FLEXERIL) 10 mg tablet Take 10 mg by mouth daily as needed.       atorvastatin (LIPITOR) 40 mg tablet Take 1 Tab by mouth daily. (Patient taking differently: Take 40 mg by mouth nightly.) 90 Tab 3    hydroCHLOROthiazide (MICROZIDE) 12.5 mg capsule Take 12.5 mg by mouth nightly. 3    dilTIAZem CD (CARTIA XT) 120 mg ER capsule Take 120 mg by mouth daily. omeprazole (PRILOSEC) 20 mg capsule Take 20 mg by mouth daily. No current facility-administered medications on file prior to visit. No Known Allergies  Family History   Problem Relation Age of Onset    Heart Disease Mother     OSTEOARTHRITIS Mother     OSTEOARTHRITIS Father     Heart Surgery Neg Hx     Heart Attack Neg Hx      Social History     Socioeconomic History    Marital status:      Spouse name: Not on file    Number of children: Not on file    Years of education: Not on file    Highest education level: Not on file   Occupational History    Not on file   Tobacco Use    Smoking status: Never    Smokeless tobacco: Never   Vaping Use    Vaping Use: Never used   Substance and Sexual Activity    Alcohol use: No     Alcohol/week: 0.0 standard drinks    Drug use: No    Sexual activity: Not on file   Other Topics Concern    Not on file   Social History Narrative    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 She has no pets. No exposure to asbestos, feathers, pets     Social Determinants of Health     Financial Resource Strain: Not on file   Food Insecurity: Not on file   Transportation Needs: Not on file   Physical Activity: Not on file   Stress: Not on file   Social Connections: Not on file   Intimate Partner Violence: Not on file   Housing Stability: Not on file     Blood pressure (!) 151/86, pulse 99, temperature 97.4 °F (36.3 °C), temperature source Temporal, resp. rate 18, height 5' 5\" (1.651 m), weight 62.5 kg (137 lb 11.2 oz), SpO2 99 %. Physical Exam  Constitutional:       General: She is not in acute distress. Appearance: Normal appearance. She is not ill-appearing, toxic-appearing or diaphoretic.    HENT: Head: Normocephalic and atraumatic. Right Ear: External ear normal.      Left Ear: External ear normal.      Nose: Nose normal. No congestion. Mouth/Throat:      Mouth: Mucous membranes are moist.      Pharynx: No oropharyngeal exudate. Eyes:      General: No scleral icterus. Right eye: No discharge. Left eye: No discharge. Extraocular Movements: Extraocular movements intact. Conjunctiva/sclera: Conjunctivae normal.      Pupils: Pupils are equal, round, and reactive to light. Neck:      Vascular: No carotid bruit. Cardiovascular:      Rate and Rhythm: Normal rate and regular rhythm. Pulses: Normal pulses. Heart sounds: Normal heart sounds. No murmur heard. No friction rub. No gallop. Pulmonary:      Effort: Pulmonary effort is normal. No respiratory distress. Breath sounds: Normal breath sounds. No stridor. No wheezing, rhonchi or rales. Chest:      Chest wall: No tenderness. Abdominal:      General: There is no distension. Palpations: Abdomen is soft. There is no mass. Tenderness: There is no abdominal tenderness. There is no rebound. Musculoskeletal:         General: No swelling, tenderness, deformity or signs of injury. Cervical back: Neck supple. No rigidity or tenderness. No muscular tenderness. Right lower leg: No edema. Left lower leg: No edema. Lymphadenopathy:      Cervical: No cervical adenopathy. Skin:     General: Skin is warm and dry. Coloration: Skin is not jaundiced or pale. Findings: No bruising, erythema, lesion or rash. Neurological:      General: No focal deficit present. Mental Status: She is alert and oriented to person, place, and time. Motor: No weakness. Coordination: Coordination normal.   Psychiatric:         Mood and Affect: Mood normal.         Behavior: Behavior normal.         Thought Content:  Thought content normal.         Judgment: Judgment normal.     CT Results (most recent):  Results from [place] encounter on 01/21/20    CT CHEST WO CONT    Narrative  HIGH RESOLUTION CT CHEST    CPT CODE: 97893    COMPARISON: 7/30/2019. INDICATIONS: Mosaic attenuation at the lung bases on prior CT with high  resolution CT recommended. TECHNIQUE: Axial scanning of the chest is performed with high resolution thin  sections in both inspiration and expiration. Additional inspiratory prone images  were performed. All CT scans at this facility are performed using dose optimization technique as  appropriate to a performed exam, to include automated exposure control,  adjustment of the mA and/or kV according to patient size (including appropriate  matching first site-specific examinations), or use of iterative reconstruction  technique. FINDINGS:  Thyroid: Unremarkable in its visualized aspects. Pericardium/ Heart: No significant effusion. Lipomatous hypertrophy of the  pericardium and intra-atrial septum. Severe LAD coronary arteriosclerosis. Aorta/ Vessels: Aorta is normal in caliber. .  Mild atherosclerosis. Lymph Nodes: Unremarkable. .    Lungs: Mild mosaic lung attenuation at the bases. Mild bilateral lower lobe  bronchial wall thickening. No significant reticulation or groundglass. There are  multifocal regions of air trapping on expiration view. The mosaic attenuation  improves with prone imaging. Pleura: No effusion. Upper Abdomen: Cholelithiasis. Bones/soft tissues: Osteopenia. Degenerative disc disease. Impression  IMPRESSION:    1. Mosaic attenuation with multifocal regions of air trapping. Mild bilateral  lower lobe bronchial wall thickening which may represent bronchitis, bronchial  wall edema or reactive airway disease. 2.  Lipomatous hypertrophy of the pericardium and interatrial septum. 3.  Severe LAD coronary arteriosclerosis. 4.  Cholelithiasis. ASSESSMENT and PLAN  Encounter Diagnoses   Name Primary?     Mild persistent asthma without complication Yes    LUCY (obstructive sleep apnea)     Intolerance of continuous positive airway pressure (CPAP) ventilation      Pt with apparent intolerance to APAP, still awaits titration ordered previously. Calls to sleep center remain unanswered, will keep on trying to facilitate sleep study. Better asthma control noted with proper dosing of LABA/ICS. Will continue along with prn Albuterol. Continue rest of medications as listed.   RTC 6 months  Dav Richards on next visit

## 2022-12-14 NOTE — PROGRESS NOTES
Zhang Craig presents today for No chief complaint on file. Is someone accompanying this pt? yes    Is the patient using any DME equipment during OV? no    -DME Company n/a    Depression Screening:  3 most recent PHQ Screens 12/14/2022   Little interest or pleasure in doing things Not at all   Feeling down, depressed, irritable, or hopeless Not at all   Total Score PHQ 2 0       Learning Assessment:  Learning Assessment 8/15/2019   PRIMARY LEARNER Patient   PRIMARY LANGUAGE ENGLISH   LEARNER PREFERENCE PRIMARY LISTENING   ANSWERED BY patient   RELATIONSHIP SELF       Abuse Screening:  Abuse Screening Questionnaire 2/15/2022   Do you ever feel afraid of your partner? N   Are you in a relationship with someone who physically or mentally threatens you? N   Is it safe for you to go home? Y       Fall Risk  Fall Risk Assessment, last 12 mths 9/22/2022   Able to walk? Yes   Fall in past 12 months? 0   Do you feel unsteady? 0   Are you worried about falling 0         Coordination of Care:  1. Have you been to the ER, urgent care clinic since your last visit? Hospitalized since your last visit? No    2. Have you seen or consulted any other health care providers outside of the [address] [place] [address] since your last visit? Include any pap smears or colon screening.  no

## 2022-12-28 DIAGNOSIS — J45.40 MODERATE PERSISTENT ASTHMA: ICD-10-CM

## 2023-01-31 DIAGNOSIS — G47.33 OSA ON CPAP: Primary | ICD-10-CM

## 2023-01-31 DIAGNOSIS — Z99.89 OSA ON CPAP: Primary | ICD-10-CM

## 2023-03-06 ENCOUNTER — TELEPHONE (OUTPATIENT)
Dept: SLEEP MEDICINE | Facility: HOSPITAL | Age: 68
End: 2023-03-06

## 2023-03-13 ENCOUNTER — TELEPHONE (OUTPATIENT)
Dept: SLEEP MEDICINE | Facility: HOSPITAL | Age: 68
End: 2023-03-13

## 2023-04-26 ENCOUNTER — OFFICE VISIT (OUTPATIENT)
Age: 68
End: 2023-04-26
Payer: COMMERCIAL

## 2023-04-26 VITALS
HEART RATE: 90 BPM | SYSTOLIC BLOOD PRESSURE: 149 MMHG | HEIGHT: 65 IN | OXYGEN SATURATION: 98 % | DIASTOLIC BLOOD PRESSURE: 91 MMHG | WEIGHT: 142 LBS | BODY MASS INDEX: 23.66 KG/M2

## 2023-04-26 DIAGNOSIS — E78.5 HYPERLIPIDEMIA, UNSPECIFIED HYPERLIPIDEMIA TYPE: ICD-10-CM

## 2023-04-26 DIAGNOSIS — G47.33 OBSTRUCTIVE SLEEP APNEA (ADULT) (PEDIATRIC): ICD-10-CM

## 2023-04-26 DIAGNOSIS — I10 ESSENTIAL (PRIMARY) HYPERTENSION: ICD-10-CM

## 2023-04-26 DIAGNOSIS — I25.10 ATHEROSCLEROSIS OF NATIVE CORONARY ARTERY OF NATIVE HEART WITHOUT ANGINA PECTORIS: Primary | ICD-10-CM

## 2023-04-26 DIAGNOSIS — J84.9 INTERSTITIAL PULMONARY DISEASE, UNSPECIFIED (HCC): ICD-10-CM

## 2023-04-26 PROCEDURE — 3080F DIAST BP >= 90 MM HG: CPT | Performed by: INTERNAL MEDICINE

## 2023-04-26 PROCEDURE — 99214 OFFICE O/P EST MOD 30 MIN: CPT | Performed by: INTERNAL MEDICINE

## 2023-04-26 PROCEDURE — 3077F SYST BP >= 140 MM HG: CPT | Performed by: INTERNAL MEDICINE

## 2023-04-26 PROCEDURE — 1123F ACP DISCUSS/DSCN MKR DOCD: CPT | Performed by: INTERNAL MEDICINE

## 2023-04-26 NOTE — PROGRESS NOTES
Patient did not bring medications or med list today    1. Have you been to the ER, urgent care clinic since your last visit? Hospitalized since your last visit? No    2. Have you seen or consulted any other health care providers outside of the [address] [place] [address] since your last visit? Include any pap smears or colon screening. No     3. Do you need any refills today?    no

## 2023-04-26 NOTE — PROGRESS NOTES
HISTORY OF PRESENT ILLNESS  Austin Pereira is a 74 y.o. female. 2/2021  Patient is here for follow-up earlier than her usual visit. She has been having episodes of sudden shortness of breath and tightness this happens at nighttime. She is usually on CPAP she is to get off CPAP and go outside for air. This is happened frequently lately. Denies any edema denies any chest tightness on exertion but she cannot walk much. Denies any fever chills or cough. 8/2022  Patient seen in follow - up. She reports has been moving this week and c/o cough with shortness of breath. She reports is not longer on CPAP or inhaler (unsure of name) because it makes her cough and vomit. She has been off of both x 2 weeks. She denies chest pain, palpitations or edema. Follow-up  The history is provided by the Patient and medical records. Associated symptoms include shortness of breath. Pertinent negatives include no chest pain, no abdominal pain and no headaches. Hypertension  The history is provided by the Patient. This is a chronic problem. The problem occurs constantly. Associated symptoms include shortness of breath. Pertinent negatives include no chest pain, no abdominal pain and no headaches. Shortness of Breath  The history is provided by the Patient. This is a recurrent problem. The problem occurs intermittently. The problem has been gradually improving. Associated symptoms include cough. Pertinent negatives include no fever, no headaches, no sputum production, no hemoptysis, no wheezing, no PND, no orthopnea, no chest pain, no vomiting, no abdominal pain, no rash, no leg swelling and no claudication. The problem's precipitants include exercise. Cholesterol Problem  The history is provided by the Patient. This is a chronic problem. The problem occurs constantly. The problem has not changed since onset. Associated symptoms include shortness of breath.  Pertinent negatives include no chest pain, no abdominal pain

## 2023-05-10 ENCOUNTER — HOSPITAL ENCOUNTER (OUTPATIENT)
Facility: HOSPITAL | Age: 68
Discharge: HOME | End: 2023-05-13
Payer: COMMERCIAL

## 2023-05-10 DIAGNOSIS — Z12.31 ENCOUNTER FOR SCREENING MAMMOGRAM FOR BREAST CANCER: ICD-10-CM

## 2023-05-10 PROCEDURE — 77063 BREAST TOMOSYNTHESIS BI: CPT

## 2023-06-07 ENCOUNTER — TELEPHONE (OUTPATIENT)
Dept: SLEEP MEDICINE | Facility: HOSPITAL | Age: 68
End: 2023-06-07

## 2023-06-07 NOTE — PROGRESS NOTES
1. Have you been to the ER, urgent care clinic since your last visit? Hospitalized since your last visit? No    2. Have you seen or consulted any other health care providers outside of the [address] [place] [address] since your last visit? Include any pap smears or colon screening.  No Yes

## 2023-07-12 ENCOUNTER — TRANSCRIBE ORDERS (OUTPATIENT)
Facility: HOSPITAL | Age: 68
End: 2023-07-12

## 2023-07-12 DIAGNOSIS — R07.9 CHEST PAIN, UNSPECIFIED TYPE: Primary | ICD-10-CM

## 2023-07-25 ENCOUNTER — HOSPITAL ENCOUNTER (OUTPATIENT)
Dept: SLEEP MEDICINE | Facility: HOSPITAL | Age: 68
Discharge: HOME | End: 2023-07-28
Attending: INTERNAL MEDICINE
Payer: MEDICARE

## 2023-07-25 DIAGNOSIS — G47.30 SLEEP APNEA, UNSPECIFIED TYPE: ICD-10-CM

## 2023-07-25 PROCEDURE — 95810 POLYSOM 6/> YRS 4/> PARAM: CPT

## 2023-07-26 VITALS
WEIGHT: 137 LBS | SYSTOLIC BLOOD PRESSURE: 138 MMHG | DIASTOLIC BLOOD PRESSURE: 83 MMHG | HEIGHT: 65 IN | BODY MASS INDEX: 22.82 KG/M2 | HEART RATE: 82 BPM

## 2024-04-16 ENCOUNTER — TRANSCRIBE ORDERS (OUTPATIENT)
Facility: HOSPITAL | Age: 69
End: 2024-04-16

## 2024-04-16 DIAGNOSIS — Z12.31 VISIT FOR SCREENING MAMMOGRAM: Primary | ICD-10-CM

## 2024-05-15 ENCOUNTER — HOSPITAL ENCOUNTER (OUTPATIENT)
Facility: HOSPITAL | Age: 69
Discharge: HOME | End: 2024-05-18
Payer: MEDICARE

## 2024-05-15 VITALS — WEIGHT: 143 LBS | BODY MASS INDEX: 26.31 KG/M2 | HEIGHT: 62 IN

## 2024-05-15 DIAGNOSIS — Z12.31 VISIT FOR SCREENING MAMMOGRAM: ICD-10-CM

## 2024-05-15 PROCEDURE — 77063 BREAST TOMOSYNTHESIS BI: CPT

## 2024-11-21 NOTE — PROGRESS NOTES
PCCM Interval Note:    Post bronchoscopy, pt was very tachypneic and dyspneic. Pt's SpO2 remained 100% while complaining of dyspnea. Patient was weaned off of supplemental oxygen with some reported dyspnea. Patient was treated with DuoNeb and Decadron 10 mg IV with similar symptoms. On exam the patient had bilateral rhonchi, without wheezes, did have some central wheezing. Later, the patient still reported similar symptoms, the patient was given one-time dose of IV Ativan. With further discussion with the patient and her friends at bedside, they report the patient has been feeling depressed and anxious for a while now. This is been exacerbated since her mother passed about 2 weeks ago. I advised them that the patient should follow-up with her PCP with regards to depression and anxiety, for consideration of addition of SSRI such as Celexa. I also advised them to follow-up with ENT, Dr. Ethel Cantrell is a patient when he sees, for work-up of possible VCD. I did advise them that if the patient's dyspnea is likely unrelated to her mild interstitial lung disease. They expressed understanding. Addendum:  Pt had apnea during the bronchoscopy along with snoring and tracheobronchomalacia. Pt also reporting excessive daytime sleepiness in the setting of CHFpEF (CHF with diastolic dysfunction). Due to these findings, polysomnography was ordered due to high pretest probability of LUCY.       Gustavo Mayen MD/MPH     Pulmonary, Critical Care Medicine  [facility] Patient expressed no known problems or needs

## (undated) DEVICE — SET ADMIN L104IN 20 GTT GRAV RLER CLMP SMRT SITE NDL FREE

## (undated) DEVICE — MEDI-VAC SUCTION HIGH CAPACITY: Brand: CARDINAL HEALTH

## (undated) DEVICE — APPLICATOR FBR TIP L6IN COT TIP WOOD SHFT SWAB 2000 PER CA

## (undated) DEVICE — SINGLE USE SUCTION VALVE MAJ-209: Brand: SINGLE USE SUCTION VALVE (STERILE)

## (undated) DEVICE — STERILE POLYISOPRENE POWDER-FREE SURGICAL GLOVES: Brand: PROTEXIS

## (undated) DEVICE — BE 105-8 BRONCHOSCOPE SWIVEL - 15MM ID/22MM OD (PATIENT PORT) X15MM OD (EQUIPMENT PORT). REUSABLE.  FITS COMPONENTS OF ADULT VENTILATOR CIRCUITS.  MOLDED OF POLYETHERIMIDE. INCLUDES TWO SILICONE RUBBER CAPS; ONE CAP ALLOWS FOR THE USE OF A SUCTIONING CATHETER WHILE THE OTHER CAP ALLOWS FOR THE USE OF A FIBER-OPTIC BRONCHOSCOPE WITHOUT SIGNIFICANT LOSS OF PEEP.: Brand: BE 105-8 BRONCHOSCOPE SWIVEL

## (undated) DEVICE — MAILER SLDE MICSCP 2 PLC --

## (undated) DEVICE — MASK SURG REG ORNG LEV 3 SFTY SEAL 4 LAYR SFT INNR LINING

## (undated) DEVICE — DRAPE TWL SURG 16X26IN BLU ORB04] ALLCARE INC]

## (undated) DEVICE — SYR 10ML SLIP TIP 1/5ML GRAD --

## (undated) DEVICE — NDL BX EXCELON 21GX130CM --

## (undated) DEVICE — CONTAINER PREFIL FRMLN 40ML --

## (undated) DEVICE — CATHETER SUCT TR FL TIP 14FR W/ O CTRL

## (undated) DEVICE — AIRLIFE™ MISTY MAX 10™ NEBULIZER WITH 7 FOOT (2.1 M) CRUSH RESISTANT OXYGEN TUBING, BAFFLED TEE ADAPTER (22 MM I.D./22 MM O.D.), MOUTHPIECE AND 6 INCH (15 CM) FLEXTUBE: Brand: AIRLIFE™

## (undated) DEVICE — 1860S HEALTH CARE RESPIRATOR N95 120EA/C: Brand: 3M™

## (undated) DEVICE — FCPS BIOP PULM RAD JAW 100CML -- BX/10 M00515180

## (undated) DEVICE — 1860 HEALTH CARE N95 MASK, 20EACH/BOX  6 BX/C: Brand: 3M™

## (undated) DEVICE — SINGLE USE BIOPSY VALVE MAJ-210: Brand: SINGLE USE BIOPSY VALVE (STERILE)

## (undated) DEVICE — REAG CYTO FIX 4OZ PMP SPRY --

## (undated) DEVICE — FLEX ADVANTAGE 3000CC: Brand: FLEX ADVANTAGE

## (undated) DEVICE — AIRLIFE™ ADULT OXYGEN MASK VINYL, UNDER-THE-CHIN STYLE, MEDIUM CONCENTRATION MASK WITH 7 FEET (2.1 M) CRUSH-RESISTANT OXYGEN TUBING: Brand: AIRLIFE™

## (undated) DEVICE — MEDI-VAC NON-CONDUCTIVE SUCTION TUBING: Brand: CARDINAL HEALTH

## (undated) DEVICE — BLADE TNGE REG 6IN WOOD STRL -- CONVERT TO ITEM 153408

## (undated) DEVICE — BASIN EMESIS 500CC ROSE 250/CS 60/PLT: Brand: MEDEGEN MEDICAL PRODUCTS, LLC

## (undated) DEVICE — SLEEVE COMPR STD 12 IN FOR 165IN CALF COMFORT VENODYNE SYS

## (undated) DEVICE — WANG TRANSBRONCHIAL HISTOLOGY NEEDLE FOR CENTRAL REGION, 1.9 MM X 130 CM: Brand: WANG

## (undated) DEVICE — SLIDE MICRO PLAIN PRECLEAND --

## (undated) DEVICE — AIRLIFE™ NASAL OXYGEN CANNULA CURVED, FLARED TIP WITH 14 FOOT (4.3 M) CRUSH-RESISTANT TUBING, OVER-THE-EAR STYLE: Brand: AIRLIFE™

## (undated) DEVICE — GOWN ISOL IMPERV UNIV, DISP, OPEN BACK, BLUE --